# Patient Record
Sex: FEMALE | Race: WHITE | NOT HISPANIC OR LATINO | Employment: FULL TIME | URBAN - METROPOLITAN AREA
[De-identification: names, ages, dates, MRNs, and addresses within clinical notes are randomized per-mention and may not be internally consistent; named-entity substitution may affect disease eponyms.]

---

## 2018-06-25 ENCOUNTER — TELEPHONE (OUTPATIENT)
Dept: FAMILY MEDICINE | Facility: CLINIC | Age: 58
End: 2018-06-25

## 2018-06-25 NOTE — TELEPHONE ENCOUNTER
----- Message from Марина Lopez sent at 6/25/2018  8:58 AM CDT -----  Contact: Patient  Type:  Sooner Apoointment Request    Caller is requesting a sooner appointment.  Caller declined first available appointment listed below.  Caller will not accept being placed on the waitlist and is requesting a message be sent to doctor.    Name of Caller:  Patient  When is the first available appointment?  8/14  Symptoms:  NP - Annual and labs  Best Call Back Number:    Additional Information:

## 2018-06-27 ENCOUNTER — TELEPHONE (OUTPATIENT)
Dept: FAMILY MEDICINE | Facility: CLINIC | Age: 58
End: 2018-06-27

## 2018-06-27 ENCOUNTER — OFFICE VISIT (OUTPATIENT)
Dept: FAMILY MEDICINE | Facility: CLINIC | Age: 58
End: 2018-06-27
Payer: COMMERCIAL

## 2018-06-27 ENCOUNTER — LAB VISIT (OUTPATIENT)
Dept: LAB | Facility: HOSPITAL | Age: 58
End: 2018-06-27
Attending: PHYSICIAN ASSISTANT
Payer: COMMERCIAL

## 2018-06-27 VITALS
TEMPERATURE: 98 F | WEIGHT: 137.38 LBS | BODY MASS INDEX: 22.08 KG/M2 | HEIGHT: 66 IN | SYSTOLIC BLOOD PRESSURE: 105 MMHG | HEART RATE: 94 BPM | DIASTOLIC BLOOD PRESSURE: 73 MMHG

## 2018-06-27 DIAGNOSIS — R05.9 COUGH: ICD-10-CM

## 2018-06-27 DIAGNOSIS — Z11.59 NEED FOR HEPATITIS C SCREENING TEST: ICD-10-CM

## 2018-06-27 DIAGNOSIS — R79.89 POSITIVE D DIMER: ICD-10-CM

## 2018-06-27 DIAGNOSIS — Z82.61 FAMILY HISTORY OF RHEUMATOID ARTHRITIS: ICD-10-CM

## 2018-06-27 DIAGNOSIS — R42 DIZZINESS: ICD-10-CM

## 2018-06-27 DIAGNOSIS — Z13.220 LIPID SCREENING: ICD-10-CM

## 2018-06-27 DIAGNOSIS — M25.50 ARTHRALGIA, UNSPECIFIED JOINT: ICD-10-CM

## 2018-06-27 DIAGNOSIS — J18.9 PNEUMONIA OF RIGHT UPPER LOBE DUE TO INFECTIOUS ORGANISM: ICD-10-CM

## 2018-06-27 DIAGNOSIS — R50.9 FEVER OF UNKNOWN ORIGIN: Primary | ICD-10-CM

## 2018-06-27 DIAGNOSIS — R74.01 TRANSAMINITIS: ICD-10-CM

## 2018-06-27 DIAGNOSIS — R50.9 FEVER, UNSPECIFIED FEVER CAUSE: ICD-10-CM

## 2018-06-27 DIAGNOSIS — R50.9 FEVER, UNSPECIFIED FEVER CAUSE: Primary | ICD-10-CM

## 2018-06-27 DIAGNOSIS — D72.829 LEUKOCYTOSIS, UNSPECIFIED TYPE: ICD-10-CM

## 2018-06-27 LAB
ALBUMIN SERPL BCP-MCNC: 2.4 G/DL
ALP SERPL-CCNC: 119 U/L
ALT SERPL W/O P-5'-P-CCNC: 139 U/L
ANION GAP SERPL CALC-SCNC: 12 MMOL/L
AST SERPL-CCNC: 255 U/L
BASOPHILS # BLD AUTO: 0.07 K/UL
BASOPHILS NFR BLD: 0.4 %
BILIRUB SERPL-MCNC: 0.5 MG/DL
BILIRUB UR QL STRIP: NEGATIVE
BUN SERPL-MCNC: 6 MG/DL
CALCIUM SERPL-MCNC: 9.5 MG/DL
CHLORIDE SERPL-SCNC: 94 MMOL/L
CHOLEST SERPL-MCNC: 133 MG/DL
CHOLEST/HDLC SERPL: 9.5 {RATIO}
CLARITY UR REFRACT.AUTO: ABNORMAL
CO2 SERPL-SCNC: 29 MMOL/L
COLOR UR AUTO: ABNORMAL
CREAT SERPL-MCNC: 0.7 MG/DL
CRP SERPL-MCNC: 296.7 MG/L
DIFFERENTIAL METHOD: ABNORMAL
EOSINOPHIL # BLD AUTO: 0 K/UL
EOSINOPHIL NFR BLD: 0.1 %
ERYTHROCYTE [DISTWIDTH] IN BLOOD BY AUTOMATED COUNT: 14.4 %
ERYTHROCYTE [SEDIMENTATION RATE] IN BLOOD BY WESTERGREN METHOD: 112 MM/HR
EST. GFR  (AFRICAN AMERICAN): >60 ML/MIN/1.73 M^2
EST. GFR  (NON AFRICAN AMERICAN): >60 ML/MIN/1.73 M^2
GLUCOSE SERPL-MCNC: 108 MG/DL
GLUCOSE UR QL STRIP: NEGATIVE
HCT VFR BLD AUTO: 38.9 %
HDLC SERPL-MCNC: 14 MG/DL
HDLC SERPL: 10.5 %
HGB BLD-MCNC: 12.5 G/DL
HGB UR QL STRIP: NEGATIVE
IMM GRANULOCYTES # BLD AUTO: 0.22 K/UL
IMM GRANULOCYTES NFR BLD AUTO: 1.2 %
KETONES UR QL STRIP: ABNORMAL
LDLC SERPL CALC-MCNC: 89.6 MG/DL
LEUKOCYTE ESTERASE UR QL STRIP: NEGATIVE
LYMPHOCYTES # BLD AUTO: 1.6 K/UL
LYMPHOCYTES NFR BLD: 9.2 %
MCH RBC QN AUTO: 33 PG
MCHC RBC AUTO-ENTMCNC: 32.1 G/DL
MCV RBC AUTO: 103 FL
MONOCYTES # BLD AUTO: 1.6 K/UL
MONOCYTES NFR BLD: 8.8 %
NEUTROPHILS # BLD AUTO: 14.2 K/UL
NEUTROPHILS NFR BLD: 80.3 %
NITRITE UR QL STRIP: NEGATIVE
NONHDLC SERPL-MCNC: 119 MG/DL
NRBC BLD-RTO: 0 /100 WBC
PH UR STRIP: 6 [PH] (ref 5–8)
PLATELET # BLD AUTO: 530 K/UL
PMV BLD AUTO: 11.1 FL
POTASSIUM SERPL-SCNC: 3.5 MMOL/L
PROT SERPL-MCNC: 7.8 G/DL
PROT UR QL STRIP: NEGATIVE
RBC # BLD AUTO: 3.79 M/UL
RHEUMATOID FACT SERPL-ACNC: <10 IU/ML
SODIUM SERPL-SCNC: 135 MMOL/L
SP GR UR STRIP: 1.01 (ref 1–1.03)
TRIGL SERPL-MCNC: 147 MG/DL
TSH SERPL DL<=0.005 MIU/L-ACNC: 1.07 UIU/ML
URN SPEC COLLECT METH UR: ABNORMAL
UROBILINOGEN UR STRIP-ACNC: 4 EU/DL
WBC # BLD AUTO: 17.67 K/UL

## 2018-06-27 PROCEDURE — 81003 URINALYSIS AUTO W/O SCOPE: CPT

## 2018-06-27 PROCEDURE — 93000 ELECTROCARDIOGRAM COMPLETE: CPT | Mod: S$GLB,,, | Performed by: INTERNAL MEDICINE

## 2018-06-27 PROCEDURE — 85651 RBC SED RATE NONAUTOMATED: CPT | Mod: PO

## 2018-06-27 PROCEDURE — 86803 HEPATITIS C AB TEST: CPT

## 2018-06-27 PROCEDURE — 85025 COMPLETE CBC W/AUTO DIFF WBC: CPT

## 2018-06-27 PROCEDURE — 99999 PR PBB SHADOW E&M-EST. PATIENT-LVL III: CPT | Mod: PBBFAC,,, | Performed by: PHYSICIAN ASSISTANT

## 2018-06-27 PROCEDURE — 80053 COMPREHEN METABOLIC PANEL: CPT

## 2018-06-27 PROCEDURE — 87086 URINE CULTURE/COLONY COUNT: CPT

## 2018-06-27 PROCEDURE — 80061 LIPID PANEL: CPT

## 2018-06-27 PROCEDURE — 99204 OFFICE O/P NEW MOD 45 MIN: CPT | Mod: S$GLB,,, | Performed by: PHYSICIAN ASSISTANT

## 2018-06-27 PROCEDURE — 36415 COLL VENOUS BLD VENIPUNCTURE: CPT | Mod: PO

## 2018-06-27 PROCEDURE — 3008F BODY MASS INDEX DOCD: CPT | Mod: CPTII,S$GLB,, | Performed by: PHYSICIAN ASSISTANT

## 2018-06-27 PROCEDURE — 86140 C-REACTIVE PROTEIN: CPT

## 2018-06-27 PROCEDURE — 84443 ASSAY THYROID STIM HORMONE: CPT

## 2018-06-27 PROCEDURE — 86431 RHEUMATOID FACTOR QUANT: CPT

## 2018-06-27 PROCEDURE — 86038 ANTINUCLEAR ANTIBODIES: CPT

## 2018-06-27 NOTE — PROGRESS NOTES
Subjective:       Patient ID: Jesi Nagy is a 57 y.o. female.    Chief Complaint: Dizziness (fever)    Ms. Nagy is a new patient to our clinic. She has no chronic medical conditions and she takes no medication. The patient reports that she has had recurrent illness over the last 2 months. She reports that she has a cough productive of blood tinged phlegm. The patient has been diagnosed with sinus infection and treated with antibiotics, oral steroids, and cough syrup. The patient reports that she did have a chest xray at the urgent care. She was told there were no acute findings. The patient also reports fever each night with a tmax of 102. The patient reports body aches, arthralgia, myalgia. The patient does have a family history of RA. The patient denies recent travel or sick contacts. The patient admits to dizziness, which is worse with changing position.       Dizziness:   Chronicity:  New  Onset:  1 to 4 weeks ago  Progression since onset:  Gradually worsening  Frequency - weeks/days included: intermittently, daily.  Severity:  Moderate  Duration:  1 minute  Dizziness characteristics:  Sensation of movement and off-balance  Frequency of Spells:  Daily   Associated symptoms: ear congestion, a fever and aural fullness.no hearing loss, no headaches, no tinnitus, no nausea, no vomiting, no light-headedness, no syncope, no palpitations, no slurred speech, no numbness in extremities and no chest pain.  Aggravated by:  Position changes  Treatments tried:  Nothing  Fever    This is a recurrent problem. The current episode started more than 1 month ago. The problem occurs daily. The problem has been unchanged. The maximum temperature noted was 102 to 102.9 F. The temperature was taken using an oral thermometer. Associated symptoms include congestion, coughing, muscle aches and sleepiness. Pertinent negatives include no abdominal pain, chest pain, headaches, nausea, rash, urinary pain, vomiting or wheezing. She  has tried nothing for the symptoms.   Risk factors: no contaminated food, no contaminated water, no hx of cancer, no immunosuppression, no occupational exposure, no recent sickness, no recent travel and no sick contacts      Review of Systems   Constitutional: Positive for activity change and fever. Negative for appetite change.   HENT: Positive for congestion. Negative for hearing loss, postnasal drip, rhinorrhea, sinus pressure and tinnitus.    Eyes: Negative for visual disturbance.   Respiratory: Positive for cough. Negative for shortness of breath and wheezing.    Cardiovascular: Negative for chest pain, palpitations and syncope.   Gastrointestinal: Negative for abdominal distention, abdominal pain, nausea and vomiting.   Genitourinary: Negative for difficulty urinating and dysuria.   Musculoskeletal: Positive for arthralgias and myalgias.   Skin: Negative for rash.   Neurological: Positive for dizziness. Negative for light-headedness and headaches.   Hematological: Negative for adenopathy.   Psychiatric/Behavioral: The patient is not nervous/anxious.        Objective:      Physical Exam   Constitutional: She is oriented to person, place, and time.   Orthostatics  Lying 107/77 (78)  Sitting 106/79 (86)  Standing 112/80 (95)   HENT:   Mouth/Throat: Oropharynx is clear and moist. No oropharyngeal exudate.   Eyes: Conjunctivae are normal. Pupils are equal, round, and reactive to light.   Cardiovascular: Normal rate and regular rhythm.    Pulmonary/Chest: Effort normal and breath sounds normal. She has no wheezes.   Abdominal: Soft. Bowel sounds are normal. There is no tenderness.   Musculoskeletal: She exhibits no edema.   Lymphadenopathy:     She has no cervical adenopathy.   Neurological: She is alert and oriented to person, place, and time.   Skin: No erythema.   Psychiatric: Her behavior is normal.       Assessment:       1. Fever, unspecified fever cause    2. Dizziness    3. Cough    4. Arthralgia,  unspecified joint    5. Family history of rheumatoid arthritis        Plan:   Jesi was seen today for dizziness.    Diagnoses and all orders for this visit:    Fever, unspecified fever cause  -     CBC auto differential; Future  -     Comprehensive metabolic panel; Future  -     Urinalysis; Future  -     Urine culture; Future    Dizziness  -     EKG 12-lead  -     CBC auto differential; Future  -     Comprehensive metabolic panel; Future  -     TSH; Future  -     Echocardiogram stress test with [facility] (CUPID ONLY-[facility], [facility], [facility], [facility]); Future    Cough  We will request record of CXR  Arthralgia, unspecified joint  -     Rheumatoid factor; Future  -     Sedimentation rate; Future  -     C-reactive protein; Future  -     SWATI; Future    Family history of rheumatoid arthritis  -     Rheumatoid factor; Future  -     Sedimentation rate; Future  -     C-reactive protein; Future  -     SWATI; Future    Lipid screening  -     Lipid panel; Future    Need for hepatitis C screening test  -     Hepatitis C antibody; Future         Follow up early next week or sooner if needed. If symptoms worsen or if new concerning symptoms develop, seek urgent medical attention.

## 2018-06-27 NOTE — TELEPHONE ENCOUNTER
I called patient and advised her about the elevated white blood cell count. The patient reports it has been over a month since she stopped oral steroids. Patient advised that additional work up with additional blood work, CXR, and echo will are needed. Patient is coming tomorrow morning for CXR and blood work. Please schedule echo ASAP. Please schedule blood work and CXR for tomorrow morning.

## 2018-06-27 NOTE — TELEPHONE ENCOUNTER
Lab, x-ray and echocardiogram have been scheduled for tomorrow morning. Lab and x-ray will be at the hospital. Patient is aware and agrees to this.

## 2018-06-28 ENCOUNTER — HOSPITAL ENCOUNTER (OUTPATIENT)
Dept: RADIOLOGY | Facility: HOSPITAL | Age: 58
Discharge: HOME | DRG: 871 | End: 2018-06-28
Attending: PHYSICIAN ASSISTANT
Payer: COMMERCIAL

## 2018-06-28 ENCOUNTER — CLINICAL SUPPORT (OUTPATIENT)
Dept: CARDIOLOGY | Facility: CLINIC | Age: 58
End: 2018-06-28
Attending: PHYSICIAN ASSISTANT
Payer: COMMERCIAL

## 2018-06-28 VITALS — BODY MASS INDEX: 22.08 KG/M2 | WEIGHT: 137.38 LBS | HEIGHT: 66 IN

## 2018-06-28 DIAGNOSIS — D72.829 LEUKOCYTOSIS, UNSPECIFIED TYPE: ICD-10-CM

## 2018-06-28 DIAGNOSIS — R50.9 FEVER OF UNKNOWN ORIGIN: ICD-10-CM

## 2018-06-28 LAB
ANA SER QL IF: NORMAL
ASCENDING AORTA: 2.75 CM
AV MEAN GRADIENT: 2.49 MMHG
AV PEAK GRADIENT: 5.39 MMHG
AV VALVE AREA: 3.71 CM2
BACTERIA UR CULT: NORMAL
BACTERIA UR CULT: NORMAL
BSA FOR ECHO PROCEDURE: 1.7 M2
CV ECHO LV RWT: 0.3 CM
DOP CALC AO PEAK VEL: 1.16 M/S
DOP CALC AO VTI: 18.64 CM
DOP CALC LVOT AREA: 3.4 CM2
DOP CALC LVOT DIAMETER: 2.08 CM
DOP CALC LVOT STROKE VOLUME: 69.22 CM3
DOP CALCLVOT PEAK VEL VTI: 20.38 CM
E WAVE DECELERATION TIME: 202.53 MSEC
E/A RATIO: 0.78
E/E' RATIO: 6.53
ECHO LV POSTERIOR WALL: 0.68 CM (ref 0.6–1.1)
FRACTIONAL SHORTENING: 32 % (ref 28–44)
HCV AB SERPL QL IA: NEGATIVE
INTERVENTRICULAR SEPTUM: 0.75 CM (ref 0.6–1.1)
IVRT: 0.11 MSEC
LA MAJOR: 3.34 CM
LA MINOR: 3.92 CM
LA WIDTH: 1.59 CM
LEFT ATRIUM SIZE: 2.39 CM
LEFT ATRIUM VOLUME INDEX: 6.9 ML/M2
LEFT ATRIUM VOLUME: 11.65 CM3
LEFT INTERNAL DIMENSION IN SYSTOLE: 3.23 CM (ref 2.1–4)
LEFT VENTRICLE MASS INDEX: 62.7 G/M2
LEFT VENTRICULAR INTERNAL DIMENSION IN DIASTOLE: 4.72 CM (ref 3.5–6)
LEFT VENTRICULAR MASS: 106.64 G
LV LATERAL E/E' RATIO: 5.17
LV SEPTAL E/E' RATIO: 8.86
MV PEAK A VEL: 0.8 M/S
MV PEAK E VEL: 0.62 M/S
MV STENOSIS PRESSURE HALF TIME: 58.73 MS
MV VALVE AREA P 1/2 METHOD: 3.75 CM2
PISA TR MAX VEL: 2.32 M/S
PULM VEIN S/D RATIO: 1.76
PV PEAK D VEL: 0.37 M/S
PV PEAK S VEL: 0.65 M/S
RA MAJOR: 3.1 CM
RA PRESSURE: 3 MMHG
RA WIDTH: 1.67 CM
RIGHT VENTRICULAR END-DIASTOLIC DIMENSION: 2 CM
SINUS: 2.42 CM
STJ: 2.32 CM
TDI LATERAL: 0.12
TDI SEPTAL: 0.07
TDI: 0.1
TR MAX PG: 21.53 MMHG
TRICUSPID ANNULAR PLANE SYSTOLIC EXCURSION: 0.02 CM
TV REST PULMONARY ARTERY PRESSURE: 24.53 MMHG

## 2018-06-28 PROCEDURE — 71046 X-RAY EXAM CHEST 2 VIEWS: CPT | Mod: 26,,, | Performed by: RADIOLOGY

## 2018-06-28 PROCEDURE — 71046 X-RAY EXAM CHEST 2 VIEWS: CPT | Mod: TC,FY

## 2018-06-28 PROCEDURE — 99999 PR PBB SHADOW E&M-EST. PATIENT-LVL I: CPT | Mod: PBBFAC,,,

## 2018-06-28 PROCEDURE — 93306 TTE W/DOPPLER COMPLETE: CPT | Mod: S$GLB,,, | Performed by: INTERNAL MEDICINE

## 2018-06-29 ENCOUNTER — HOSPITAL ENCOUNTER (OUTPATIENT)
Dept: RADIOLOGY | Facility: HOSPITAL | Age: 58
Discharge: HOME | DRG: 871 | End: 2018-06-29
Attending: PHYSICIAN ASSISTANT
Payer: COMMERCIAL

## 2018-06-29 ENCOUNTER — HOSPITAL ENCOUNTER (INPATIENT)
Facility: HOSPITAL | Age: 58
LOS: 7 days | Discharge: HOME | DRG: 871 | End: 2018-07-06
Attending: HOSPITALIST | Admitting: HOSPITALIST
Payer: COMMERCIAL

## 2018-06-29 ENCOUNTER — CLINICAL SUPPORT (OUTPATIENT)
Dept: FAMILY MEDICINE | Facility: CLINIC | Age: 58
End: 2018-06-29
Payer: COMMERCIAL

## 2018-06-29 DIAGNOSIS — J85.1 ABSCESS OF RIGHT LUNG WITH PNEUMONIA, UNSPECIFIED PART OF LUNG: ICD-10-CM

## 2018-06-29 DIAGNOSIS — R79.89 POSITIVE D DIMER: ICD-10-CM

## 2018-06-29 DIAGNOSIS — J18.9 PNEUMONIA OF RIGHT UPPER LOBE DUE TO INFECTIOUS ORGANISM: ICD-10-CM

## 2018-06-29 DIAGNOSIS — J85.0 NECROTIZING PNEUMONIA: ICD-10-CM

## 2018-06-29 DIAGNOSIS — R04.2 HEMOPTYSIS: Primary | ICD-10-CM

## 2018-06-29 DIAGNOSIS — R93.89 ABNORMAL CHEST CT: Primary | ICD-10-CM

## 2018-06-29 PROBLEM — K02.9 CARIES: Status: ACTIVE | Noted: 2018-06-29

## 2018-06-29 PROCEDURE — 63600175 PHARM REV CODE 636 W HCPCS: Performed by: INTERNAL MEDICINE

## 2018-06-29 PROCEDURE — 71275 CT ANGIOGRAPHY CHEST: CPT | Mod: TC

## 2018-06-29 PROCEDURE — 94761 N-INVAS EAR/PLS OXIMETRY MLT: CPT

## 2018-06-29 PROCEDURE — 86580 TB INTRADERMAL TEST: CPT | Performed by: INTERNAL MEDICINE

## 2018-06-29 PROCEDURE — 71275 CT ANGIOGRAPHY CHEST: CPT | Mod: 26,,, | Performed by: RADIOLOGY

## 2018-06-29 PROCEDURE — 25500020 PHARM REV CODE 255

## 2018-06-29 PROCEDURE — 36415 COLL VENOUS BLD VENIPUNCTURE: CPT

## 2018-06-29 PROCEDURE — 25000003 PHARM REV CODE 250: Performed by: INTERNAL MEDICINE

## 2018-06-29 PROCEDURE — 99223 1ST HOSP IP/OBS HIGH 75: CPT | Mod: ,,, | Performed by: INTERNAL MEDICINE

## 2018-06-29 PROCEDURE — 87040 BLOOD CULTURE FOR BACTERIA: CPT | Mod: 59

## 2018-06-29 PROCEDURE — 25000003 PHARM REV CODE 250: Performed by: HOSPITALIST

## 2018-06-29 PROCEDURE — 11000001 HC ACUTE MED/SURG PRIVATE ROOM

## 2018-06-29 RX ORDER — HYDROCODONE BITARTRATE AND ACETAMINOPHEN 5; 325 MG/1; MG/1
1 TABLET ORAL EVERY 6 HOURS PRN
Status: DISCONTINUED | OUTPATIENT
Start: 2018-06-29 | End: 2018-07-01

## 2018-06-29 RX ORDER — POTASSIUM CHLORIDE 20 MEQ/15ML
40 SOLUTION ORAL
Status: DISCONTINUED | OUTPATIENT
Start: 2018-06-29 | End: 2018-07-06 | Stop reason: HOSPADM

## 2018-06-29 RX ORDER — LEVOFLOXACIN 750 MG/1
750 TABLET ORAL DAILY
Qty: 7 TABLET | Refills: 0 | Status: SHIPPED | OUTPATIENT
Start: 2018-06-29 | End: 2018-06-29

## 2018-06-29 RX ORDER — GLUCAGON 1 MG
1 KIT INJECTION
Status: DISCONTINUED | OUTPATIENT
Start: 2018-06-29 | End: 2018-07-06 | Stop reason: HOSPADM

## 2018-06-29 RX ORDER — IBUPROFEN 200 MG
24 TABLET ORAL
Status: DISCONTINUED | OUTPATIENT
Start: 2018-06-29 | End: 2018-07-06 | Stop reason: HOSPADM

## 2018-06-29 RX ORDER — ONDANSETRON 2 MG/ML
8 INJECTION INTRAMUSCULAR; INTRAVENOUS EVERY 8 HOURS PRN
Status: DISCONTINUED | OUTPATIENT
Start: 2018-06-29 | End: 2018-07-06 | Stop reason: HOSPADM

## 2018-06-29 RX ORDER — IBUPROFEN 600 MG/1
600 TABLET ORAL 4 TIMES DAILY
Status: DISCONTINUED | OUTPATIENT
Start: 2018-06-29 | End: 2018-07-06 | Stop reason: HOSPADM

## 2018-06-29 RX ORDER — HYDROCODONE POLISTIREX AND CHLORPHENIRAMINE POLISTIREX 10; 8 MG/5ML; MG/5ML
5 SUSPENSION, EXTENDED RELEASE ORAL EVERY 12 HOURS PRN
Status: DISCONTINUED | OUTPATIENT
Start: 2018-06-29 | End: 2018-07-02

## 2018-06-29 RX ORDER — RAMELTEON 8 MG/1
8 TABLET ORAL NIGHTLY
Status: DISCONTINUED | OUTPATIENT
Start: 2018-06-29 | End: 2018-07-06 | Stop reason: HOSPADM

## 2018-06-29 RX ORDER — CEFTRIAXONE 1 G/1
1 INJECTION, POWDER, FOR SOLUTION INTRAMUSCULAR; INTRAVENOUS
Status: DISCONTINUED | OUTPATIENT
Start: 2018-06-29 | End: 2018-06-29

## 2018-06-29 RX ORDER — RAMELTEON 8 MG/1
8 TABLET ORAL NIGHTLY PRN
Status: DISCONTINUED | OUTPATIENT
Start: 2018-06-29 | End: 2018-06-29

## 2018-06-29 RX ORDER — ACETAMINOPHEN 500 MG
1000 TABLET ORAL EVERY 6 HOURS PRN
Status: DISCONTINUED | OUTPATIENT
Start: 2018-06-29 | End: 2018-07-06 | Stop reason: HOSPADM

## 2018-06-29 RX ORDER — VANCOMYCIN HCL IN 5 % DEXTROSE 1G/250ML
1000 PLASTIC BAG, INJECTION (ML) INTRAVENOUS
Status: DISCONTINUED | OUTPATIENT
Start: 2018-06-30 | End: 2018-07-01

## 2018-06-29 RX ORDER — SODIUM CHLORIDE 0.9 % (FLUSH) 0.9 %
5 SYRINGE (ML) INJECTION
Status: DISCONTINUED | OUTPATIENT
Start: 2018-06-29 | End: 2018-07-06 | Stop reason: HOSPADM

## 2018-06-29 RX ORDER — LANOLIN ALCOHOL/MO/W.PET/CERES
800 CREAM (GRAM) TOPICAL
Status: DISCONTINUED | OUTPATIENT
Start: 2018-06-29 | End: 2018-07-06 | Stop reason: HOSPADM

## 2018-06-29 RX ORDER — SODIUM CHLORIDE 9 MG/ML
INJECTION, SOLUTION INTRAVENOUS
Status: DISPENSED
Start: 2018-06-29 | End: 2018-06-29

## 2018-06-29 RX ORDER — IBUPROFEN 200 MG
16 TABLET ORAL
Status: DISCONTINUED | OUTPATIENT
Start: 2018-06-29 | End: 2018-07-06 | Stop reason: HOSPADM

## 2018-06-29 RX ADMIN — PIPERACILLIN SODIUM AND TAZOBACTAM SODIUM 4.5 G: 4; .5 INJECTION, POWDER, LYOPHILIZED, FOR SOLUTION INTRAVENOUS at 03:06

## 2018-06-29 RX ADMIN — HYDROCODONE POLISTIREX AND CHLORPHENIRAMINE POLISITREX 5 ML: 10; 8 SUSPENSION, EXTENDED RELEASE ORAL at 07:06

## 2018-06-29 RX ADMIN — ACETAMINOPHEN 1000 MG: 500 TABLET, FILM COATED ORAL at 03:06

## 2018-06-29 RX ADMIN — IBUPROFEN 600 MG: 600 TABLET ORAL at 01:06

## 2018-06-29 RX ADMIN — PIPERACILLIN SODIUM AND TAZOBACTAM SODIUM 4.5 G: 4; .5 INJECTION, POWDER, LYOPHILIZED, FOR SOLUTION INTRAVENOUS at 11:06

## 2018-06-29 RX ADMIN — TUBERCULIN PURIFIED PROTEIN DERIVATIVE 5 UNITS: 5 INJECTION, SOLUTION INTRADERMAL at 03:06

## 2018-06-29 RX ADMIN — IOHEXOL 75 ML: 350 INJECTION, SOLUTION INTRAVENOUS at 08:06

## 2018-06-29 RX ADMIN — HYDROCODONE BITARTRATE AND ACETAMINOPHEN 1 TABLET: 5; 325 TABLET ORAL at 11:06

## 2018-06-29 RX ADMIN — HYDROCODONE BITARTRATE AND ACETAMINOPHEN 1 TABLET: 5; 325 TABLET ORAL at 04:06

## 2018-06-29 RX ADMIN — VANCOMYCIN HYDROCHLORIDE 1250 MG: 1 INJECTION, POWDER, LYOPHILIZED, FOR SOLUTION INTRAVENOUS at 03:06

## 2018-06-29 RX ADMIN — IBUPROFEN 600 MG: 600 TABLET ORAL at 11:06

## 2018-06-29 RX ADMIN — IBUPROFEN 600 MG: 600 TABLET ORAL at 04:06

## 2018-06-29 RX ADMIN — RAMELTEON 8 MG: 8 TABLET, FILM COATED ORAL at 11:06

## 2018-06-29 NOTE — CONSULTS
Jesi Nagy [id number] is a 57 y.o. female who has been consulted for vancomycin dosing.    The patient has the following labs:     Date Creatinine (mg/dl)    BUN WBC Count   6/29/2018 Estimated Creatinine Clearance: 85 mL/min (based on SCr of 0.7 mg/dL). Lab Results   Component Value Date    BUN 6 06/27/2018     Lab Results   Component Value Date    WBC 17.67 (H) 06/27/2018        Current weight is 61.2 kg (135 lb)    The patient received  1250 mg on 06/29 at 1500 (if pt has already received first dose including doses in ED)    The patient will be started on vancomycin at a dose of 1000 mg every 12 hours (16 mg/kg/dose).  The vancomycin trough has been ordered for 07/01 at 0230.  Target trough goal is 15 to 20 mg/dL for pneumonia.    Patient will be followed by pharmacy for changes in renal function, toxicity, and efficacy.   Thank you for allowing us to participate in this patient's care.     Jr Nelson, FazalD

## 2018-06-29 NOTE — PLAN OF CARE
Problem: Patient Care Overview  Goal: Individualization & Mutuality  Pt admitted to floor. Tb isolation iniatited. TB test preformed to Left FA. IV Abx infused. VSS. Pain controled with PRN pain meds. Family at bedside. Bed low locked. Call light in reach. Will continue to monitor.

## 2018-06-29 NOTE — HPI
Patient is a 57-year-old  female with a past medical history significant for anxiety who presents to the hospital after having being referred from the clinic.  Patient had upper respiratory symptoms for approximately 6 months prior to admission.  Showed intermittent periods of cough, bloody sputum, and fever up to 102.  Denies any night sweats or weight loss.  Each time, the patient had presented to urgent care and started on antibiotics for short course in which she transiently improved but then relapsed again approximately 1-2 weeks later.  Patient has been through per her own description at least 5 courses of oral antibiotics and has continued to have intermittent spiking fevers in between regimens of antibiotics.  She sought care in the Primary Care Clinic and was initially found to have an abnormality on chest x-ray and markedly elevated inflammatory markers which prompted further evaluation by CT scan of the chest.  On CT scan, the patient was found to have a right upper lobe necrotizing pneumonia.  Patient states she continues to have sharp chest pain which is exacerbated with activity and deep breathing.  She does also admit to intermittent fevers the last of which was 2 days ago.  Patient denies any nausea, vomiting, diarrhea, or other abdominal complaints.

## 2018-06-29 NOTE — H&P
[facility] Hospital Medicine  History & Physical    Patient Name: Jesi Nagy  MRN: [id number]  Admission Date: 6/29/2018  Attending Physician: Melvin Garcia MD  Primary Care Provider: Primary Doctor No         Patient information was obtained from patient and ER records.     Subjective:     Principal Problem:Necrotizing pneumonia    Chief Complaint: No chief complaint on file.       HPI: Patient is a 57-year-old  female with a past medical history significant for anxiety who presents to the hospital after having being referred from the clinic.  Patient had upper respiratory symptoms for approximately 6 months prior to admission.  Showed intermittent periods of cough, bloody sputum, and fever up to 102.  Denies any night sweats or weight loss.  Each time, the patient had presented to urgent care and started on antibiotics for short course in which she transiently improved but then relapsed again approximately 1-2 weeks later.  Patient has been through per her own description at least 5 courses of oral antibiotics and has continued to have intermittent spiking fevers in between regimens of antibiotics.  She sought care in the Primary Care Clinic and was initially found to have an abnormality on chest x-ray and markedly elevated inflammatory markers which prompted further evaluation by CT scan of the chest.  On CT scan, the patient was found to have a right upper lobe necrotizing pneumonia.  Patient states she continues to have sharp chest pain which is exacerbated with activity and deep breathing.  She does also admit to intermittent fevers the last of which was 2 days ago.  Patient denies any nausea, vomiting, diarrhea, or other abdominal complaints.    No past medical history on file.    Past Surgical History:   Procedure Laterality Date    baker's cyst removal      BREAST SURGERY      HYSTERECTOMY      KNEE SURGERY Bilateral     laproscopic surgery    SHOULDER SURGERY Left      laproscopic       Review of patient's allergies indicates:  No Known Allergies    Current Facility-Administered Medications on File Prior to Encounter   Medication    [COMPLETED] omnipaque 350 iohexol 350 mg iodine/mL    sodium chloride 0.9% infusion     Current Outpatient Prescriptions on File Prior to Encounter   Medication Sig    [DISCONTINUED] levoFLOXacin (LEVAQUIN) 750 MG tablet Take 1 tablet (750 mg total) by mouth once daily.     Family History     Problem Relation (Age of Onset)    Heart disease Father    Parkinsonism Father    Rheum arthritis Mother        Social History Main Topics    Smoking status: Former Smoker     Packs/day: 1.00     Years: 30.00     Types: Cigarettes    Smokeless tobacco: Never Used      Comment: Patient now vapes    Alcohol use 4.2 oz/week     7 Glasses of wine per week      Comment: glass of wine daily    Drug use: No    Sexual activity: Yes     Birth control/ protection: See Surgical Hx     Review of Systems   Constitutional: Positive for activity change, appetite change, fatigue and fever.   HENT: Negative for ear discharge, facial swelling and sore throat.    Eyes: Negative for photophobia, pain and visual disturbance.   Respiratory: Positive for cough and shortness of breath. Negative for apnea.    Cardiovascular: Negative for chest pain and leg swelling.   Gastrointestinal: Negative for abdominal pain and blood in stool.   Endocrine: Negative for cold intolerance and heat intolerance.   Musculoskeletal: Negative for back pain and gait problem.   Skin: Negative for pallor and rash.   Neurological: Negative for speech difficulty and headaches.   Psychiatric/Behavioral: Negative for confusion, hallucinations and suicidal ideas.   All other systems reviewed and are negative.    Objective:     Vital Signs (Most Recent):  Temp: 96.3 °F (35.7 °C) (06/29/18 1611)  Pulse: 84 (06/29/18 1611)  Resp: 18 (06/29/18 1611)  BP: 117/63 (06/29/18 1611)  SpO2: 98 % (06/29/18 1611)  Vital Signs (24h Range):  Temp:  [96.3 °F (35.7 °C)-97.8 °F (36.6 °C)] 96.3 °F (35.7 °C)  Pulse:  [84-98] 84  Resp:  [18] 18  SpO2:  [98 %] 98 %  BP: (117-137)/(63-73) 117/63     Weight: 61.2 kg (135 lb)  Body mass index is 21.79 kg/m².    Physical Exam   Constitutional: She is oriented to person, place, and time. She appears well-developed and well-nourished.   HENT:   Head: Normocephalic and atraumatic.   Eyes: Right eye exhibits no discharge. Left eye exhibits no discharge. No scleral icterus.   Neck: Neck supple. No JVD present.   Cardiovascular: Normal rate and regular rhythm.  Exam reveals no gallop and no friction rub.    No murmur heard.  Pulmonary/Chest: Effort normal. She has no wheezes. She has no rales.   Decreased breath sounds noted at the right upper lung field.  No increased work of breathing is noted. Chest wall tenderness noted on palpation particularly of the right side.   Abdominal: Soft. Bowel sounds are normal. She exhibits no distension. There is no tenderness.   Musculoskeletal: She exhibits no edema or tenderness.   Lymphadenopathy:     She has no cervical adenopathy.   Neurological: She is alert and oriented to person, place, and time. She has normal reflexes. No cranial nerve deficit.   Skin: No rash noted. No erythema.   Psychiatric: She has a normal mood and affect.   Nursing note and vitals reviewed.          Significant Labs: All pertinent labs within the past 24 hours have been reviewed.    Significant Imaging: I have reviewed all pertinent imaging results/findings within the past 24 hours.    Assessment/Plan:     * Necrotizing pneumonia    Patient with near complete opacification of the right upper lobe with severe necrotizing pneumonia and what appears to be air fluid levels within the lung.  Likely will need surgical treatment to excise this necrotizing pneumonia given the fact the patient has been on antibiotics previously without success..  Will consult with pulmonology and  cardiothoracic surgery and assess probability for patient to receive thoracotomy with partial lobectomy.  Will continue patient on IV antibiotics for healthcare associated pneumonia with addition of anaerobes given necrotizing component.  Defer further management to pulmonology and/or CT surgery.  It is unlikely the patient has tuberculosis based off of risk factors, however will follow up TB gold and isolate patient until lab result is back.          Abscess of right lung with pneumonia    Treatment per above          Hemoptysis    Etiology likely related to severe pneumonia.  Treatment per above.  Malignancy unlikely, however not completely ruled out at this point.            VTE Risk Mitigation         Ordered     IP VTE LOW RISK PATIENT  Once      06/29/18 1243             Melvin Garcia MD  Department of Hospital Medicine   [facility]

## 2018-06-29 NOTE — ASSESSMENT & PLAN NOTE
Etiology likely related to severe pneumonia.  Treatment per above.  Malignancy unlikely, however not completely ruled out at this point.

## 2018-06-29 NOTE — PROGRESS NOTES
Subjective:       Patient ID: Jesi Nagy is a 57 y.o. female.    Chief Complaint: No chief complaint on file.    HPI  Review of Systems    Objective:      Physical Exam    Assessment:       1. Abnormal chest CT    2. Pneumonia of right upper lobe due to infectious organism        Plan:   Diagnoses and all orders for this visit:    Abnormal chest CT    Pneumonia of right upper lobe due to infectious organism    Patient came to clinic. CTA results reviewed with patient. Patient directly admitted to hospital. Report given to Melvin Garcia MD.

## 2018-06-29 NOTE — PLAN OF CARE
Cm completed the assessment at pt's bedside.  Spouse at bedside.  Pt arrived from home, she is independent in care.  No PCP.  Insurance verified as [employer].   No dme.  Pt denies diabetes, dialysis and coumadin.  Disposition:  Pt will discharge to home with family.  Pt verbalized no needs at this time.       06/29/18 1601   Discharge Assessment   Assessment Type Discharge Planning Assessment   Confirmed/corrected address and phone number on facesheet? Yes   Assessment information obtained from? Patient   Prior to hospitilization cognitive status: Alert/Oriented   Prior to hospitalization functional status: Independent   Current cognitive status: Alert/Oriented   Current Functional Status: Independent   Facility Arrived From: home   Lives With spouse   Able to Return to Prior Arrangements yes   Is patient able to care for self after discharge? Yes   Who are your caregiver(s) and their phone number(s)? spouse - Crow Nagy - [phone]   Patient's perception of discharge disposition home or selfcare   Readmission Within The Last 30 Days no previous admission in last 30 days   Patient currently being followed by outpatient case management? No   Patient currently receives any other outside agency services? No   Equipment Currently Used at Home none   Do you have any problems affording any of your prescribed medications? No   Is the patient taking medications as prescribed? yes  ([facility] [address]   Does the patient have transportation home? Yes   Transportation Available car;family or friend will provide   Dialysis Name and Scheduled days na   Does the patient receive services at the Coumadin Clinic? No   Discharge Plan A Home with family   Patient/Family In Agreement With Plan yes

## 2018-06-29 NOTE — SUBJECTIVE & OBJECTIVE
No past medical history on file.    Past Surgical History:   Procedure Laterality Date    baker's cyst removal      BREAST SURGERY      HYSTERECTOMY      KNEE SURGERY Bilateral     laproscopic surgery    SHOULDER SURGERY Left     laproscopic       Review of patient's allergies indicates:  No Known Allergies    Current Facility-Administered Medications on File Prior to Encounter   Medication    [COMPLETED] omnipaque 350 iohexol 350 mg iodine/mL    sodium chloride 0.9% infusion     Current Outpatient Prescriptions on File Prior to Encounter   Medication Sig    [DISCONTINUED] levoFLOXacin (LEVAQUIN) 750 MG tablet Take 1 tablet (750 mg total) by mouth once daily.     Family History     Problem Relation (Age of Onset)    Heart disease Father    Parkinsonism Father    Rheum arthritis Mother        Social History Main Topics    Smoking status: Former Smoker     Packs/day: 1.00     Years: 30.00     Types: Cigarettes    Smokeless tobacco: Never Used      Comment: Patient now vapes    Alcohol use 4.2 oz/week     7 Glasses of wine per week      Comment: glass of wine daily    Drug use: No    Sexual activity: Yes     Birth control/ protection: See Surgical Hx     Review of Systems   Constitutional: Positive for activity change, appetite change, fatigue and fever.   HENT: Negative for ear discharge, facial swelling and sore throat.    Eyes: Negative for photophobia, pain and visual disturbance.   Respiratory: Positive for cough and shortness of breath. Negative for apnea.    Cardiovascular: Negative for chest pain and leg swelling.   Gastrointestinal: Negative for abdominal pain and blood in stool.   Endocrine: Negative for cold intolerance and heat intolerance.   Musculoskeletal: Negative for back pain and gait problem.   Skin: Negative for pallor and rash.   Neurological: Negative for speech difficulty and headaches.   Psychiatric/Behavioral: Negative for confusion, hallucinations and suicidal ideas.   All other  systems reviewed and are negative.    Objective:     Vital Signs (Most Recent):  Temp: 96.3 °F (35.7 °C) (06/29/18 1611)  Pulse: 84 (06/29/18 1611)  Resp: 18 (06/29/18 1611)  BP: 117/63 (06/29/18 1611)  SpO2: 98 % (06/29/18 1611) Vital Signs (24h Range):  Temp:  [96.3 °F (35.7 °C)-97.8 °F (36.6 °C)] 96.3 °F (35.7 °C)  Pulse:  [84-98] 84  Resp:  [18] 18  SpO2:  [98 %] 98 %  BP: (117-137)/(63-73) 117/63     Weight: 61.2 kg (135 lb)  Body mass index is 21.79 kg/m².    Physical Exam   Constitutional: She is oriented to person, place, and time. She appears well-developed and well-nourished.   HENT:   Head: Normocephalic and atraumatic.   Eyes: Right eye exhibits no discharge. Left eye exhibits no discharge. No scleral icterus.   Neck: Neck supple. No JVD present.   Cardiovascular: Normal rate and regular rhythm.  Exam reveals no gallop and no friction rub.    No murmur heard.  Pulmonary/Chest: Effort normal. She has no wheezes. She has no rales.   Decreased breath sounds noted at the right upper lung field.  No increased work of breathing is noted. Chest wall tenderness noted on palpation particularly of the right side.   Abdominal: Soft. Bowel sounds are normal. She exhibits no distension. There is no tenderness.   Musculoskeletal: She exhibits no edema or tenderness.   Lymphadenopathy:     She has no cervical adenopathy.   Neurological: She is alert and oriented to person, place, and time. She has normal reflexes. No cranial nerve deficit.   Skin: No rash noted. No erythema.   Psychiatric: She has a normal mood and affect.   Nursing note and vitals reviewed.          Significant Labs: All pertinent labs within the past 24 hours have been reviewed.    Significant Imaging: I have reviewed all pertinent imaging results/findings within the past 24 hours.

## 2018-06-29 NOTE — ASSESSMENT & PLAN NOTE
Patient with near complete opacification of the right upper lobe with severe necrotizing pneumonia and what appears to be air fluid levels within the lung.  Likely will need surgical treatment to excise this necrotizing pneumonia given the fact the patient has been on antibiotics previously without success..  Will consult with pulmonology and cardiothoracic surgery and assess probability for patient to receive thoracotomy with partial lobectomy.  Will continue patient on IV antibiotics for healthcare associated pneumonia with addition of anaerobes given necrotizing component.  Defer further management to pulmonology and/or CT surgery.  It is unlikely the patient has tuberculosis based off of risk factors, however will follow up TB gold and isolate patient until lab result is back.

## 2018-06-30 PROBLEM — E87.6 HYPOKALEMIA: Status: ACTIVE | Noted: 2018-06-30

## 2018-06-30 LAB
ALBUMIN SERPL BCP-MCNC: 2 G/DL
ALP SERPL-CCNC: 93 U/L
ALT SERPL W/O P-5'-P-CCNC: 66 U/L
ANION GAP SERPL CALC-SCNC: 11 MMOL/L
AST SERPL-CCNC: 52 U/L
BASOPHILS # BLD AUTO: 0.1 K/UL
BASOPHILS NFR BLD: 0.4 %
BILIRUB SERPL-MCNC: 0.5 MG/DL
BUN SERPL-MCNC: 6 MG/DL
CALCIUM SERPL-MCNC: 8.7 MG/DL
CHLORIDE SERPL-SCNC: 95 MMOL/L
CO2 SERPL-SCNC: 29 MMOL/L
CREAT SERPL-MCNC: 0.7 MG/DL
DIFFERENTIAL METHOD: ABNORMAL
EOSINOPHIL # BLD AUTO: 0 K/UL
EOSINOPHIL NFR BLD: 0.3 %
ERYTHROCYTE [DISTWIDTH] IN BLOOD BY AUTOMATED COUNT: 15.9 %
EST. GFR  (AFRICAN AMERICAN): >60 ML/MIN/1.73 M^2
EST. GFR  (NON AFRICAN AMERICAN): >60 ML/MIN/1.73 M^2
GLUCOSE SERPL-MCNC: 107 MG/DL
HCT VFR BLD AUTO: 35 %
HGB BLD-MCNC: 11.8 G/DL
LYMPHOCYTES # BLD AUTO: 1.1 K/UL
LYMPHOCYTES NFR BLD: 8.3 %
MAGNESIUM SERPL-MCNC: 2 MG/DL
MCH RBC QN AUTO: 33.3 PG
MCHC RBC AUTO-ENTMCNC: 33.7 G/DL
MCV RBC AUTO: 99 FL
MONOCYTES # BLD AUTO: 0.8 K/UL
MONOCYTES NFR BLD: 5.8 %
NEUTROPHILS # BLD AUTO: 11.5 K/UL
NEUTROPHILS NFR BLD: 85.2 %
PHOSPHATE SERPL-MCNC: 3.4 MG/DL
PLATELET # BLD AUTO: 426 K/UL
PMV BLD AUTO: 9.1 FL
POTASSIUM SERPL-SCNC: 2.8 MMOL/L
PROT SERPL-MCNC: 6.3 G/DL
RBC # BLD AUTO: 3.54 M/UL
SODIUM SERPL-SCNC: 135 MMOL/L
WBC # BLD AUTO: 13.5 K/UL

## 2018-06-30 PROCEDURE — 87206 SMEAR FLUORESCENT/ACID STAI: CPT

## 2018-06-30 PROCEDURE — 99232 SBSQ HOSP IP/OBS MODERATE 35: CPT | Mod: ,,, | Performed by: INTERNAL MEDICINE

## 2018-06-30 PROCEDURE — 63600175 PHARM REV CODE 636 W HCPCS: Performed by: HOSPITALIST

## 2018-06-30 PROCEDURE — 25000003 PHARM REV CODE 250: Performed by: HOSPITALIST

## 2018-06-30 PROCEDURE — 87116 MYCOBACTERIA CULTURE: CPT

## 2018-06-30 PROCEDURE — 87205 SMEAR GRAM STAIN: CPT

## 2018-06-30 PROCEDURE — 25000003 PHARM REV CODE 250: Performed by: INTERNAL MEDICINE

## 2018-06-30 PROCEDURE — 83735 ASSAY OF MAGNESIUM: CPT

## 2018-06-30 PROCEDURE — 84100 ASSAY OF PHOSPHORUS: CPT

## 2018-06-30 PROCEDURE — 94761 N-INVAS EAR/PLS OXIMETRY MLT: CPT

## 2018-06-30 PROCEDURE — 11000001 HC ACUTE MED/SURG PRIVATE ROOM

## 2018-06-30 PROCEDURE — 87070 CULTURE OTHR SPECIMN AEROBIC: CPT

## 2018-06-30 PROCEDURE — 85025 COMPLETE CBC W/AUTO DIFF WBC: CPT

## 2018-06-30 PROCEDURE — 25000003 PHARM REV CODE 250: Performed by: NURSE PRACTITIONER

## 2018-06-30 PROCEDURE — 63600175 PHARM REV CODE 636 W HCPCS: Performed by: INTERNAL MEDICINE

## 2018-06-30 PROCEDURE — 80053 COMPREHEN METABOLIC PANEL: CPT

## 2018-06-30 PROCEDURE — 87015 SPECIMEN INFECT AGNT CONCNTJ: CPT

## 2018-06-30 PROCEDURE — 36415 COLL VENOUS BLD VENIPUNCTURE: CPT

## 2018-06-30 RX ORDER — SODIUM CHLORIDE 9 MG/ML
INJECTION, SOLUTION INTRAVENOUS CONTINUOUS
Status: DISCONTINUED | OUTPATIENT
Start: 2018-06-30 | End: 2018-07-02

## 2018-06-30 RX ADMIN — IBUPROFEN 600 MG: 600 TABLET ORAL at 09:06

## 2018-06-30 RX ADMIN — SODIUM CHLORIDE: 0.9 INJECTION, SOLUTION INTRAVENOUS at 02:06

## 2018-06-30 RX ADMIN — PIPERACILLIN SODIUM AND TAZOBACTAM SODIUM 4.5 G: 4; .5 INJECTION, POWDER, LYOPHILIZED, FOR SOLUTION INTRAVENOUS at 04:06

## 2018-06-30 RX ADMIN — PIPERACILLIN SODIUM AND TAZOBACTAM SODIUM 4.5 G: 4; .5 INJECTION, POWDER, LYOPHILIZED, FOR SOLUTION INTRAVENOUS at 11:06

## 2018-06-30 RX ADMIN — PIPERACILLIN SODIUM AND TAZOBACTAM SODIUM 4.5 G: 4; .5 INJECTION, POWDER, LYOPHILIZED, FOR SOLUTION INTRAVENOUS at 10:06

## 2018-06-30 RX ADMIN — PIPERACILLIN SODIUM AND TAZOBACTAM SODIUM 4.5 G: 4; .5 INJECTION, POWDER, LYOPHILIZED, FOR SOLUTION INTRAVENOUS at 05:06

## 2018-06-30 RX ADMIN — VANCOMYCIN HYDROCHLORIDE 1000 MG: 1 INJECTION, POWDER, LYOPHILIZED, FOR SOLUTION INTRAVENOUS at 03:06

## 2018-06-30 RX ADMIN — HYDROCODONE BITARTRATE AND ACETAMINOPHEN 1 TABLET: 5; 325 TABLET ORAL at 03:06

## 2018-06-30 RX ADMIN — HYDROCODONE BITARTRATE AND ACETAMINOPHEN 1 TABLET: 5; 325 TABLET ORAL at 05:06

## 2018-06-30 RX ADMIN — IBUPROFEN 600 MG: 600 TABLET ORAL at 04:06

## 2018-06-30 RX ADMIN — IBUPROFEN 600 MG: 600 TABLET ORAL at 01:06

## 2018-06-30 RX ADMIN — POTASSIUM CHLORIDE 40 MEQ: 20 SOLUTION ORAL at 08:06

## 2018-06-30 RX ADMIN — SODIUM CHLORIDE 1000 ML: 0.9 INJECTION, SOLUTION INTRAVENOUS at 09:06

## 2018-06-30 RX ADMIN — HYDROCODONE POLISTIREX AND CHLORPHENIRAMINE POLISITREX 5 ML: 10; 8 SUSPENSION, EXTENDED RELEASE ORAL at 07:06

## 2018-06-30 RX ADMIN — POTASSIUM CHLORIDE 40 MEQ: 20 SOLUTION ORAL at 02:06

## 2018-06-30 RX ADMIN — VANCOMYCIN HYDROCHLORIDE 1000 MG: 1 INJECTION, POWDER, LYOPHILIZED, FOR SOLUTION INTRAVENOUS at 02:06

## 2018-06-30 NOTE — ASSESSMENT & PLAN NOTE
Etiology likely related to severe pneumonia.  Treatment per above.  Malignancy unlikely, however not completely ruled out at this point.  No reported hemoptysis this admission, but did have prior to admission

## 2018-06-30 NOTE — NURSING
After discussion with Dr. Almaraz, the PRN potassium replacement orders will be fulfilled and then recheck labs in the am.

## 2018-06-30 NOTE — ASSESSMENT & PLAN NOTE
Patient with near complete opacification of the right upper lobe with severe necrotizing pneumonia and what appears to be air fluid levels within the lung.  Likely will need surgical treatment to excise this necrotizing pneumonia given the fact the patient has been on antibiotics previously without success..  Will consult with pulmonology and cardiothoracic surgery for their opinion and assess probability for patient to receive thoracotomy with partial lobectomy.  Will continue patient on broad spectrum IV antibiotics for healthcare associated pneumonia with addition of anaerobes given necrotizing component.  Defer further management to pulmonology and/or CT surgery.  It is unlikely the patient has tuberculosis based off of risk factors, however will follow up TB gold and isolate patient until lab result is back.  TB sample has been collected  Sputum culture collected, will logan abx therapy accordingly

## 2018-06-30 NOTE — PROGRESS NOTES
[facility] Hospital Medicine  Progress Note    Patient Name: Jesi Nagy  MRN: [id number]  Patient Class: IP- Inpatient   Admission Date: 6/29/2018  Length of Stay: 1 days  Attending Physician: Deondre lAmaraz MD  Primary Care Provider: Primary Doctor No        Subjective:     Principal Problem:Necrotizing pneumonia    HPI:  Patient is a 57-year-old  female with a past medical history significant for anxiety who presents to the hospital after having being referred from the clinic.  Patient had upper respiratory symptoms for approximately 6 months prior to admission.  Showed intermittent periods of cough, bloody sputum, and fever up to 102.  Denies any night sweats or weight loss.  Each time, the patient had presented to urgent care and started on antibiotics for short course in which she transiently improved but then relapsed again approximately 1-2 weeks later.  Patient has been through per her own description at least 5 courses of oral antibiotics and has continued to have intermittent spiking fevers in between regimens of antibiotics.  She sought care in the [facility] and was initially found to have an abnormality on chest x-ray and markedly elevated inflammatory markers which prompted further evaluation by CT scan of the chest.  On CT scan, the patient was found to have a right upper lobe necrotizing pneumonia.  Patient states she continues to have sharp chest pain which is exacerbated with activity and deep breathing.  She does also admit to intermittent fevers the last of which was 2 days ago.  Patient denies any nausea, vomiting, diarrhea, or other abdominal complaints.    Hospital Course:  No notes on file    Interval History: bowel movement yesterday, reported normal per patient. Rounded with nurse and care plan discussed. Will give prn K today.  Pulmonary following the patient. WBC improving with antibiotics.  CTS consulted, pending. Patient still coughing, but  no shortness of breath.  Sputum culture collected this morning.    Review of Systems   Constitutional: Negative for activity change, chills and fever.   HENT: Negative for congestion, nosebleeds, sinus pain, sore throat and trouble swallowing.    Eyes: Negative for photophobia, redness and visual disturbance.   Respiratory: Positive for cough. Negative for apnea, chest tightness, shortness of breath and wheezing.    Cardiovascular: Positive for chest pain. Negative for palpitations and leg swelling.   Gastrointestinal: Negative for abdominal distention, abdominal pain, blood in stool, constipation, diarrhea, nausea and vomiting.   Endocrine: Negative for polydipsia, polyphagia and polyuria.   Genitourinary: Negative for difficulty urinating, dysuria and urgency.   Musculoskeletal: Negative for arthralgias, back pain, joint swelling, myalgias, neck pain and neck stiffness.   Neurological: Negative for dizziness, tremors, speech difficulty, weakness, light-headedness and headaches.   Hematological: Negative for adenopathy. Does not bruise/bleed easily.   Psychiatric/Behavioral: Negative for agitation, behavioral problems, confusion and decreased concentration.     Objective:     Vital Signs (Most Recent):  Temp: 97.7 °F (36.5 °C) (06/30/18 0431)  Pulse: 72 (06/30/18 0431)  Resp: 18 (06/30/18 0431)  BP: 107/61 (06/30/18 0431)  SpO2: 100 % (06/30/18 0431) Vital Signs (24h Range):  Temp:  [96.3 °F (35.7 °C)-97.8 °F (36.6 °C)] 97.7 °F (36.5 °C)  Pulse:  [63-98] 72  Resp:  [14-18] 18  SpO2:  [97 %-100 %] 100 %  BP: (106-137)/(61-73) 107/61     Weight: 61.2 kg (135 lb)  Body mass index is 21.79 kg/m².    Intake/Output Summary (Last 24 hours) at 06/30/18 0801  Last data filed at 06/29/18 1700   Gross per 24 hour   Intake              590 ml   Output                0 ml   Net              590 ml      Physical Exam   Constitutional: She is oriented to person, place, and time. She appears well-developed and well-nourished. No  distress.   HENT:   Head: Normocephalic and atraumatic.   Mouth/Throat: No oropharyngeal exudate.   Eyes: Conjunctivae and EOM are normal. Pupils are equal, round, and reactive to light. Right eye exhibits no discharge. No scleral icterus.   Neck: Normal range of motion. Neck supple. No tracheal deviation present. No thyromegaly present.   Cardiovascular: Normal rate, regular rhythm, normal heart sounds and intact distal pulses.  Exam reveals no gallop and no friction rub.    No murmur heard.  Pulmonary/Chest: Effort normal and breath sounds normal. No respiratory distress. She has no wheezes. She has no rales.   Abdominal: Soft. Bowel sounds are normal. She exhibits no distension. There is no tenderness. There is no guarding.   Musculoskeletal: Normal range of motion. She exhibits no edema, tenderness or deformity.   Lymphadenopathy:     She has no cervical adenopathy.   Neurological: She is alert and oriented to person, place, and time. No cranial nerve deficit. She exhibits normal muscle tone.   Skin: Skin is warm and dry. Capillary refill takes less than 2 seconds. No rash noted. She is not diaphoretic. No erythema. No pallor.   Psychiatric: She has a normal mood and affect. Her behavior is normal. Judgment and thought content normal.       Significant Labs: All pertinent labs within the past 24 hours have been reviewed.    Significant Imaging: I have reviewed and interpreted all pertinent imaging results/findings within the past 24 hours.    Assessment/Plan:      * Necrotizing pneumonia    Patient with near complete opacification of the right upper lobe with severe necrotizing pneumonia and what appears to be air fluid levels within the lung.  Likely will need surgical treatment to excise this necrotizing pneumonia given the fact the patient has been on antibiotics previously without success..  Will consult with pulmonology and cardiothoracic surgery for their opinion and assess probability for patient to  receive thoracotomy with partial lobectomy.  Will continue patient on broad spectrum IV antibiotics for healthcare associated pneumonia with addition of anaerobes given necrotizing component.  Defer further management to pulmonology and/or CT surgery.  It is unlikely the patient has tuberculosis based off of risk factors, however will follow up TB gold and isolate patient until lab result is back.  TB sample has been collected  Sputum culture collected, will logan abx therapy accordingly          Hypokalemia    Patient has hypokalemia which is currently uncontrolled. Last electrolytes reviewed-   Recent Labs  Lab 06/30/18  0551   K 2.8*   . Will replace potassium and monitor electrolytes closely.             Caries    Possibly contributing to infection, will f/u blood cultures          Abscess of right lung with pneumonia    Treatment per above          Hemoptysis    Etiology likely related to severe pneumonia.  Treatment per above.  Malignancy unlikely, however not completely ruled out at this point.  No reported hemoptysis this admission, but did have prior to admission            VTE Risk Mitigation         Ordered     IP VTE LOW RISK PATIENT  Once      06/29/18 4824              Deondre Almaraz MD  Department of Hospital Medicine   [facility]

## 2018-06-30 NOTE — CONSULTS
Consult Note  Cardiothoracic Surgery    Consults  SUBJECTIVE:     History of Present Illness:  Patient is a 57 y.o. female presents with Chest discomfort, fever, productive cough    Scheduled Meds:   ibuprofen  600 mg Oral QID    piperacillin-tazobactam (ZOSYN) IVPB  4.5 g Intravenous Q6H    ramelteon  8 mg Oral QHS    vancomycin (VANCOCIN) IVPB  1,000 mg Intravenous Q12H     Infusions/Drips:   sodium chloride 0.9% 125 mL/hr at 06/30/18 1422     PRN Meds:acetaminophen, dextrose 50%, dextrose 50%, glucagon (human recombinant), glucose, glucose, HYDROcodone-acetaminophen, hydrocodone-chlorpheniramine, magnesium oxide, magnesium oxide, ondansetron, potassium chloride 10%, sodium chloride 0.9%    Review of patient's allergies indicates:  No Known Allergies    No past medical history on file.  Past Surgical History:   Procedure Laterality Date    baker's cyst removal      BREAST SURGERY      HYSTERECTOMY      KNEE SURGERY Bilateral     laproscopic surgery    SHOULDER SURGERY Left     laproscopic     Family History   Problem Relation Age of Onset    Rheum arthritis Mother     Heart disease Father     Parkinsonism Father      Social History   Substance Use Topics    Smoking status: Former Smoker     Packs/day: 1.00     Years: 30.00     Types: Cigarettes    Smokeless tobacco: Never Used      Comment: Patient now vapes    Alcohol use 4.2 oz/week     7 Glasses of wine per week      Comment: glass of wine daily          OBJECTIVE:     Vital Signs (Most Recent)  Temp: (!) 100.6 °F (38.1 °C) (06/30/18 1203)  Pulse: 92 (06/30/18 1203)  Resp: 18 (06/30/18 1203)  BP: (!) 85/51 (06/30/18 1335)  SpO2: 96 % (06/30/18 1203)    Admission Weight: 61.2 kg (135 lb) (06/29/18 1400)   Most Recent Weight: 61.2 kg (135 lb) (06/29/18 1400)    Vital Signs Range (Last 24H):  Temp:  [96.3 °F (35.7 °C)-100.6 °F (38.1 °C)]   Pulse:  [63-92]   Resp:  [14-18]   BP: ()/(51-66)   SpO2:  [96 %-100 %]     Physical Exam:  NAD  Resp:  no distress, BS  CV RRR  GI  abd  Ext no edema  Neuro grossly intact    Laboratory:  CBC:   Recent Labs  Lab 06/30/18  0551   WBC 13.50*   RBC 3.54*   HGB 11.8*   HCT 35.0*   *   MCV 99*   MCH 33.3*   MCHC 33.7     BMP:   Recent Labs  Lab 06/30/18  0551      *   K 2.8*   CL 95   CO2 29   BUN 6   CREATININE 0.7   CALCIUM 8.7   MG 2.0     Coagulation: No results for input(s): LABPROT, INR, APTT in the last 168 hours.  Microbiology Results (last 7 days)     Procedure Component Value Units Date/Time    AFB Culture & Smear [[id number]] Collected:  06/30/18 0700    Order Status:  Completed Specimen:  Respiratory from Sputum, Expectorated Updated:  06/30/18 1412     AFB CULTURE STAIN No acid fast bacilli seen.    Culture, Respiratory with Gram Stain [[id number]] Collected:  06/30/18 0700    Order Status:  Completed Specimen:  Respiratory from Sputum, Expectorated Updated:  06/30/18 1317     Gram Stain (Respiratory) <10 epithelial cells per low power field.     Gram Stain (Respiratory) No WBC's     Gram Stain (Respiratory) Many Gram positive cocci     Gram Stain (Respiratory) Many Gram negative rods    AFB Culture & Smear [[id number]] Collected:  06/30/18 0700    Order Status:  Canceled Specimen:  Respiratory from Sputum, Expectorated Updated:  06/30/18 0730    Blood culture (site 2) [[id number]] Collected:  06/29/18 1328    Order Status:  Completed Specimen:  Blood from Antecubital, Left  Arm Updated:  06/30/18 0515     Blood Culture, Routine No Growth to date    Narrative:       Site # 2, aerobic only    Blood culture (site 1) [[id number]] Collected:  06/29/18 1325    Order Status:  Completed Specimen:  Blood Updated:  06/30/18 0515     Blood Culture, Routine No Growth to date    Narrative:       Site # 1, aerobic and anaerobic          Diagnostic Results:  X-Ray: Reviewed  CT: Reviewed  Echo: Reviewed    ASSESSMENT/PLAN:     Impression : Necrotizing pneumonia    Recommendation :  May require lobectomy to  control if fails to clear with aggressive medical management.  All questions answered  Will discuss further with Dr Peralta.    Thank you for the consult

## 2018-06-30 NOTE — PROGRESS NOTES
Progress Note  PULMONARY    Admit Date: 6/29/2018 06/30/2018      SUBJECTIVE:     June 30, still poor appetite, ambulated in room, cough less and sl better      PFSH and Allergies reviewed.    OBJECTIVE:     Vitals (Most recent):  Vitals:    06/30/18 1541   BP: 100/63   Pulse: 76   Resp: 16   Temp: 96.7 °F (35.9 °C)       Vitals (24 hour range):  Temp:  [96.7 °F (35.9 °C)-100.6 °F (38.1 °C)]   Pulse:  [63-92]   Resp:  [14-18]   BP: ()/(51-66)   SpO2:  [96 %-100 %]       Intake/Output Summary (Last 24 hours) at 06/30/18 1614  Last data filed at 06/30/18 1500   Gross per 24 hour   Intake             1385 ml   Output                0 ml   Net             1385 ml          Physical Exam:  The patient's neuro status (alertness,orientation,cognitive function,motor skills,), pharyngeal exam (oral lesions, hygiene, abn dentition,), Neck (jvd,mass,thyroid,nodes in neck and above/below clavicle),RESPIRATORY(symmetry,effort,fremitus,percussion,auscultation),  Cor(rhythm,heart tones including gallops,perfusion,edema)ABD(distention,hepatic&splenomegaly,tenderness,masses), Skin(rash,cyanosis),Psyc(affect,judgement,).  Exam negative except for these pertinent findings:    Alert,no distress , chest is symmetric, no distress, normal percussion, normal fremitus and good normal breath sounds  No edeam     Radiographs reviewed: view by direct vision  No new  ]    Labs       Recent Labs  Lab 06/30/18  0551   WBC 13.50*   HGB 11.8*   HCT 35.0*   *     Recent Labs  Lab 06/30/18  0551   *   K 2.8*   CL 95   CO2 29   BUN 6   CREATININE 0.7      CALCIUM 8.7   MG 2.0   PHOS 3.4   AST 52*   ALT 66*   ALKPHOS 93   BILITOT 0.5   PROT 6.3   ALBUMIN 2.0*   No results for input(s): PH, PCO2, PO2, HCO3 in the last 24 hours.  Microbiology Results (last 7 days)     Procedure Component Value Units Date/Time    AFB Culture & Smear [[id number]] Collected:  06/30/18 0700    Order Status:  Completed Specimen:  Respiratory from  Sputum, Expectorated Updated:  06/30/18 1412     AFB CULTURE STAIN No acid fast bacilli seen.    Culture, Respiratory with Gram Stain [[id number]] Collected:  06/30/18 0700    Order Status:  Completed Specimen:  Respiratory from Sputum, Expectorated Updated:  06/30/18 1317     Gram Stain (Respiratory) <10 epithelial cells per low power field.     Gram Stain (Respiratory) No WBC's     Gram Stain (Respiratory) Many Gram positive cocci     Gram Stain (Respiratory) Many Gram negative rods    AFB Culture & Smear [[id number]] Collected:  06/30/18 0700    Order Status:  Canceled Specimen:  Respiratory from Sputum, Expectorated Updated:  06/30/18 0730    Blood culture (site 2) [[id number]] Collected:  06/29/18 1328    Order Status:  Completed Specimen:  Blood from Antecubital, Left  Arm Updated:  06/30/18 0515     Blood Culture, Routine No Growth to date    Narrative:       Site # 2, aerobic only    Blood culture (site 1) [[id number]] Collected:  06/29/18 1325    Order Status:  Completed Specimen:  Blood Updated:  06/30/18 0515     Blood Culture, Routine No Growth to date    Narrative:       Site # 1, aerobic and anaerobic          Impression:  Active Hospital Problems    Diagnosis  POA    *Necrotizing pneumonia [J85.0]  Yes    Hypokalemia [E87.6]  No    Hemoptysis [R04.2]  Yes    Abscess of right lung with pneumonia [J85.1]  Yes    Caries [K02.9]  Yes      Resolved Hospital Problems    Diagnosis Date Resolved POA   No resolved problems to display.               Plan:     June 30, gpc and gnr in sputum, discussed with Dr tyson.  Good chance will clear with abx alone. No rush to surgery.                                    .

## 2018-06-30 NOTE — PLAN OF CARE
Problem: Patient Care Overview  Goal: Plan of Care Review  Outcome: Ongoing (interventions implemented as appropriate)  Plan of care reviewed with patient. Iv antibiotics given as per orders. Droplet isolation maintained. Medicated for pain once this shift. Moderate relief obtained. Remains free from falls/injury. Instructed to call for assistance as needed . Verbalized understanding. Call light in reach. Bed in low & locked position.

## 2018-06-30 NOTE — SUBJECTIVE & OBJECTIVE
Interval History: bowel movement yesterday, reported normal per patient. Rounded with nurse and care plan discussed. Will give prn K today.  Pulmonary following the patient. WBC improving with antibiotics.  CTS consulted, pending. Patient still coughing, but no shortness of breath.  Sputum culture collected this morning.    Review of Systems   Constitutional: Negative for activity change, chills and fever.   HENT: Negative for congestion, nosebleeds, sinus pain, sore throat and trouble swallowing.    Eyes: Negative for photophobia, redness and visual disturbance.   Respiratory: Positive for cough. Negative for apnea, chest tightness, shortness of breath and wheezing.    Cardiovascular: Positive for chest pain. Negative for palpitations and leg swelling.   Gastrointestinal: Negative for abdominal distention, abdominal pain, blood in stool, constipation, diarrhea, nausea and vomiting.   Endocrine: Negative for polydipsia, polyphagia and polyuria.   Genitourinary: Negative for difficulty urinating, dysuria and urgency.   Musculoskeletal: Negative for arthralgias, back pain, joint swelling, myalgias, neck pain and neck stiffness.   Neurological: Negative for dizziness, tremors, speech difficulty, weakness, light-headedness and headaches.   Hematological: Negative for adenopathy. Does not bruise/bleed easily.   Psychiatric/Behavioral: Negative for agitation, behavioral problems, confusion and decreased concentration.     Objective:     Vital Signs (Most Recent):  Temp: 97.7 °F (36.5 °C) (06/30/18 0431)  Pulse: 72 (06/30/18 0431)  Resp: 18 (06/30/18 0431)  BP: 107/61 (06/30/18 0431)  SpO2: 100 % (06/30/18 0431) Vital Signs (24h Range):  Temp:  [96.3 °F (35.7 °C)-97.8 °F (36.6 °C)] 97.7 °F (36.5 °C)  Pulse:  [63-98] 72  Resp:  [14-18] 18  SpO2:  [97 %-100 %] 100 %  BP: (106-137)/(61-73) 107/61     Weight: 61.2 kg (135 lb)  Body mass index is 21.79 kg/m².    Intake/Output Summary (Last 24 hours) at 06/30/18 0801  Last data  filed at 06/29/18 1700   Gross per 24 hour   Intake              590 ml   Output                0 ml   Net              590 ml      Physical Exam   Constitutional: She is oriented to person, place, and time. She appears well-developed and well-nourished. No distress.   HENT:   Head: Normocephalic and atraumatic.   Mouth/Throat: No oropharyngeal exudate.   Eyes: Conjunctivae and EOM are normal. Pupils are equal, round, and reactive to light. Right eye exhibits no discharge. No scleral icterus.   Neck: Normal range of motion. Neck supple. No tracheal deviation present. No thyromegaly present.   Cardiovascular: Normal rate, regular rhythm, normal heart sounds and intact distal pulses.  Exam reveals no gallop and no friction rub.    No murmur heard.  Pulmonary/Chest: Effort normal and breath sounds normal. No respiratory distress. She has no wheezes. She has no rales.   Abdominal: Soft. Bowel sounds are normal. She exhibits no distension. There is no tenderness. There is no guarding.   Musculoskeletal: Normal range of motion. She exhibits no edema, tenderness or deformity.   Lymphadenopathy:     She has no cervical adenopathy.   Neurological: She is alert and oriented to person, place, and time. No cranial nerve deficit. She exhibits normal muscle tone.   Skin: Skin is warm and dry. Capillary refill takes less than 2 seconds. No rash noted. She is not diaphoretic. No erythema. No pallor.   Psychiatric: She has a normal mood and affect. Her behavior is normal. Judgment and thought content normal.       Significant Labs: All pertinent labs within the past 24 hours have been reviewed.    Significant Imaging: I have reviewed and interpreted all pertinent imaging results/findings within the past 24 hours.

## 2018-06-30 NOTE — PLAN OF CARE
Problem: Patient Care Overview  Goal: Individualization & Mutuality  Outcome: Ongoing (interventions implemented as appropriate)  Pt remained free of falls, injuries, or trauma during shift. Fall precautions maintained throughout shift. Bed kept in lowest position, locked. Call light within reach. Fall risk band on. Airborne precautions maintained. Educated family, insistent they have already been around her enough. Pt showed no new s/s of skin breakdown during shift. Pt repositions independently, ambulates independently .Pt rounded on hourly.No acute events throughout the shift. VS and assessment performed per orders. Hypotensive, febrile, treated for such issues per Dr. Almaraz. Patient progressing towards goals as tolerated. Plan of care reviewed with pt, all concerns addressed. Will continue to monitor.     Problem: Pain, Acute (Adult)  Goal: Identify Related Risk Factors and Signs and Symptoms  Related risk factors and signs and symptoms are identified upon initiation of Human Response Clinical Practice Guideline (CPG)   Outcome: Ongoing (interventions implemented as appropriate)  Medicated for pain this am. Unable to give narcotics due to hypotension.

## 2018-06-30 NOTE — NURSING
Pt is hypotensive with low grade temp. Notfied Dr. Almaraz who ordered new IV fluids. Ibuprofen given per orders. Pt requesting narcotic pain pill, told her that Dr. Almaraz and I both cannot let her get the pill with that low of a pressure. Will continue to monitor.

## 2018-06-30 NOTE — CONSULTS
Chart and films reviewed  Pulmonary not recommending resection at this time  Will discuss further with Dr Peralta and see patient when he feels surgery may be indicated

## 2018-06-30 NOTE — ASSESSMENT & PLAN NOTE
Patient has hypokalemia which is currently uncontrolled. Last electrolytes reviewed-   Recent Labs  Lab 06/30/18  0551   K 2.8*   . Will replace potassium and monitor electrolytes closely.

## 2018-07-01 LAB
ALBUMIN SERPL BCP-MCNC: 1.7 G/DL
ALP SERPL-CCNC: 76 U/L
ALT SERPL W/O P-5'-P-CCNC: 44 U/L
ANION GAP SERPL CALC-SCNC: 7 MMOL/L
AST SERPL-CCNC: 39 U/L
BASOPHILS # BLD AUTO: 0 K/UL
BASOPHILS NFR BLD: 0.2 %
BILIRUB SERPL-MCNC: 0.4 MG/DL
BUN SERPL-MCNC: 5 MG/DL
CALCIUM SERPL-MCNC: 8 MG/DL
CHLORIDE SERPL-SCNC: 107 MMOL/L
CO2 SERPL-SCNC: 25 MMOL/L
CREAT SERPL-MCNC: 0.6 MG/DL
DIFFERENTIAL METHOD: ABNORMAL
EOSINOPHIL # BLD AUTO: 0 K/UL
EOSINOPHIL NFR BLD: 0.1 %
ERYTHROCYTE [DISTWIDTH] IN BLOOD BY AUTOMATED COUNT: 16.1 %
EST. GFR  (AFRICAN AMERICAN): >60 ML/MIN/1.73 M^2
EST. GFR  (NON AFRICAN AMERICAN): >60 ML/MIN/1.73 M^2
GLUCOSE SERPL-MCNC: 111 MG/DL
HCT VFR BLD AUTO: 33.4 %
HGB BLD-MCNC: 11.3 G/DL
LYMPHOCYTES # BLD AUTO: 0.9 K/UL
LYMPHOCYTES NFR BLD: 6.3 %
MAGNESIUM SERPL-MCNC: 1.7 MG/DL
MCH RBC QN AUTO: 33.7 PG
MCHC RBC AUTO-ENTMCNC: 33.7 G/DL
MCV RBC AUTO: 100 FL
MONOCYTES # BLD AUTO: 0.8 K/UL
MONOCYTES NFR BLD: 5.7 %
NEUTROPHILS # BLD AUTO: 12.3 K/UL
NEUTROPHILS NFR BLD: 87.7 %
PHOSPHATE SERPL-MCNC: 3 MG/DL
PLATELET # BLD AUTO: 429 K/UL
PMV BLD AUTO: 9 FL
POTASSIUM SERPL-SCNC: 3.4 MMOL/L
PROT SERPL-MCNC: 5.6 G/DL
RBC # BLD AUTO: 3.34 M/UL
SODIUM SERPL-SCNC: 139 MMOL/L
TB INDURATION 48 - 72 HR READ: 0 MM
VANCOMYCIN TROUGH SERPL-MCNC: 8.9 UG/ML
WBC # BLD AUTO: 14.1 K/UL

## 2018-07-01 PROCEDURE — 94761 N-INVAS EAR/PLS OXIMETRY MLT: CPT

## 2018-07-01 PROCEDURE — 80202 ASSAY OF VANCOMYCIN: CPT

## 2018-07-01 PROCEDURE — 86703 HIV-1/HIV-2 1 RESULT ANTBDY: CPT

## 2018-07-01 PROCEDURE — 85025 COMPLETE CBC W/AUTO DIFF WBC: CPT

## 2018-07-01 PROCEDURE — 25000003 PHARM REV CODE 250: Performed by: HOSPITALIST

## 2018-07-01 PROCEDURE — 11000001 HC ACUTE MED/SURG PRIVATE ROOM

## 2018-07-01 PROCEDURE — 27100240 HC SENSOR, NONINVASIVE CARDIAC OUTPUT

## 2018-07-01 PROCEDURE — 63600175 PHARM REV CODE 636 W HCPCS: Performed by: INTERNAL MEDICINE

## 2018-07-01 PROCEDURE — 83735 ASSAY OF MAGNESIUM: CPT

## 2018-07-01 PROCEDURE — 25000003 PHARM REV CODE 250: Performed by: INTERNAL MEDICINE

## 2018-07-01 PROCEDURE — 80053 COMPREHEN METABOLIC PANEL: CPT

## 2018-07-01 PROCEDURE — 84100 ASSAY OF PHOSPHORUS: CPT

## 2018-07-01 PROCEDURE — 25000003 PHARM REV CODE 250: Performed by: NURSE PRACTITIONER

## 2018-07-01 PROCEDURE — 99232 SBSQ HOSP IP/OBS MODERATE 35: CPT | Mod: ,,, | Performed by: INTERNAL MEDICINE

## 2018-07-01 PROCEDURE — 36415 COLL VENOUS BLD VENIPUNCTURE: CPT

## 2018-07-01 RX ORDER — KETOROLAC TROMETHAMINE 30 MG/ML
15 INJECTION, SOLUTION INTRAMUSCULAR; INTRAVENOUS ONCE
Status: COMPLETED | OUTPATIENT
Start: 2018-07-01 | End: 2018-07-01

## 2018-07-01 RX ORDER — MIDODRINE HYDROCHLORIDE 2.5 MG/1
5 TABLET ORAL 2 TIMES DAILY WITH MEALS
Status: DISCONTINUED | OUTPATIENT
Start: 2018-07-01 | End: 2018-07-01

## 2018-07-01 RX ORDER — PREDNISONE 5 MG/1
5 TABLET ORAL 2 TIMES DAILY
Status: DISCONTINUED | OUTPATIENT
Start: 2018-07-01 | End: 2018-07-02

## 2018-07-01 RX ORDER — HYDROCODONE BITARTRATE AND ACETAMINOPHEN 5; 325 MG/1; MG/1
1 TABLET ORAL EVERY 6 HOURS PRN
Status: DISCONTINUED | OUTPATIENT
Start: 2018-07-01 | End: 2018-07-02

## 2018-07-01 RX ORDER — MIDODRINE HYDROCHLORIDE 2.5 MG/1
5 TABLET ORAL 2 TIMES DAILY WITH MEALS
Status: COMPLETED | OUTPATIENT
Start: 2018-07-01 | End: 2018-07-03

## 2018-07-01 RX ADMIN — POTASSIUM CHLORIDE 40 MEQ: 20 SOLUTION ORAL at 11:07

## 2018-07-01 RX ADMIN — MAGNESIUM OXIDE TAB 400 MG (241.3 MG ELEMENTAL MG) 800 MG: 400 (241.3 MG) TAB at 11:07

## 2018-07-01 RX ADMIN — KETOROLAC TROMETHAMINE 15 MG: 30 INJECTION, SOLUTION INTRAMUSCULAR at 10:07

## 2018-07-01 RX ADMIN — IBUPROFEN 600 MG: 600 TABLET ORAL at 01:07

## 2018-07-01 RX ADMIN — SODIUM CHLORIDE 1000 ML: 0.9 INJECTION, SOLUTION INTRAVENOUS at 01:07

## 2018-07-01 RX ADMIN — PIPERACILLIN SODIUM AND TAZOBACTAM SODIUM 4.5 G: 4; .5 INJECTION, POWDER, LYOPHILIZED, FOR SOLUTION INTRAVENOUS at 10:07

## 2018-07-01 RX ADMIN — RAMELTEON 8 MG: 8 TABLET, FILM COATED ORAL at 12:07

## 2018-07-01 RX ADMIN — PIPERACILLIN SODIUM AND TAZOBACTAM SODIUM 4.5 G: 4; .5 INJECTION, POWDER, LYOPHILIZED, FOR SOLUTION INTRAVENOUS at 04:07

## 2018-07-01 RX ADMIN — IBUPROFEN 600 MG: 600 TABLET ORAL at 08:07

## 2018-07-01 RX ADMIN — ACETAMINOPHEN 1000 MG: 500 TABLET, FILM COATED ORAL at 07:07

## 2018-07-01 RX ADMIN — HYDROCODONE POLISTIREX AND CHLORPHENIRAMINE POLISITREX 5 ML: 10; 8 SUSPENSION, EXTENDED RELEASE ORAL at 05:07

## 2018-07-01 RX ADMIN — IBUPROFEN 600 MG: 600 TABLET ORAL at 04:07

## 2018-07-01 RX ADMIN — VANCOMYCIN HYDROCHLORIDE 1500 MG: 1 INJECTION, POWDER, LYOPHILIZED, FOR SOLUTION INTRAVENOUS at 06:07

## 2018-07-01 RX ADMIN — MIDODRINE HYDROCHLORIDE 5 MG: 2.5 TABLET ORAL at 01:07

## 2018-07-01 RX ADMIN — HYDROCODONE BITARTRATE AND ACETAMINOPHEN 1 TABLET: 5; 325 TABLET ORAL at 08:07

## 2018-07-01 RX ADMIN — RAMELTEON 8 MG: 8 TABLET, FILM COATED ORAL at 08:07

## 2018-07-01 RX ADMIN — POTASSIUM CHLORIDE 40 MEQ: 20 SOLUTION ORAL at 06:07

## 2018-07-01 RX ADMIN — PIPERACILLIN SODIUM AND TAZOBACTAM SODIUM 4.5 G: 4; .5 INJECTION, POWDER, LYOPHILIZED, FOR SOLUTION INTRAVENOUS at 11:07

## 2018-07-01 RX ADMIN — VANCOMYCIN HYDROCHLORIDE 1500 MG: 1 INJECTION, POWDER, LYOPHILIZED, FOR SOLUTION INTRAVENOUS at 05:07

## 2018-07-01 RX ADMIN — SODIUM CHLORIDE 1000 ML: 0.9 INJECTION, SOLUTION INTRAVENOUS at 11:07

## 2018-07-01 RX ADMIN — PREDNISONE 5 MG: 5 TABLET ORAL at 11:07

## 2018-07-01 RX ADMIN — PREDNISONE 5 MG: 5 TABLET ORAL at 08:07

## 2018-07-01 RX ADMIN — MAGNESIUM OXIDE TAB 400 MG (241.3 MG ELEMENTAL MG) 800 MG: 400 (241.3 MG) TAB at 06:07

## 2018-07-01 NOTE — PROGRESS NOTES
Progress Note  PULMONARY    Admit Date: 6/29/2018 07/01/2018      SUBJECTIVE:     June 30, still poor appetite, ambulated in room, cough less and sl better  Ju;y 1, still poor appetite, ambulates room, violent cough with dark mucous.    PFSH and Allergies reviewed.    OBJECTIVE:     Vitals (Most recent):  Vitals:    07/01/18 0800   BP: (!) 98/55   Pulse: 83   Resp: 20   Temp: 98.6 °F (37 °C)       Vitals (24 hour range):  Temp:  [96.4 °F (35.8 °C)-100.6 °F (38.1 °C)]   Pulse:  [72-92]   Resp:  [16-20]   BP: ()/(51-63)   SpO2:  [96 %-97 %]       Intake/Output Summary (Last 24 hours) at 07/01/18 0958  Last data filed at 07/01/18 0600   Gross per 24 hour   Intake             4250 ml   Output                0 ml   Net             4250 ml          Physical Exam:  The patient's neuro status (alertness,orientation,cognitive function,motor skills,), pharyngeal exam (oral lesions, hygiene, abn dentition,), Neck (jvd,mass,thyroid,nodes in neck and above/below clavicle),RESPIRATORY(symmetry,effort,fremitus,percussion,auscultation),  Cor(rhythm,heart tones including gallops,perfusion,edema)ABD(distention,hepatic&splenomegaly,tenderness,masses), Skin(rash,cyanosis),Psyc(affect,judgement,).  Exam negative except for these pertinent findings:    Alert,no distress , chest is symmetric, no distress, normal percussion, normal fremitus and good normal breath sounds  No edeam     Radiographs reviewed: view by direct vision  No new       Labs       Recent Labs  Lab 07/01/18  0440   WBC 14.10*   HGB 11.3*   HCT 33.4*   *       Recent Labs  Lab 07/01/18  0440      K 3.4*      CO2 25   BUN 5*   CREATININE 0.6   *   CALCIUM 8.0*   MG 1.7   PHOS 3.0   AST 39   ALT 44   ALKPHOS 76   BILITOT 0.4   PROT 5.6*   ALBUMIN 1.7*   No results for input(s): PH, PCO2, PO2, HCO3 in the last 24 hours.  Microbiology Results (last 7 days)     Procedure Component Value Units Date/Time    AFB Culture & Smear [[id number]]      Order Status:  No result Specimen:  Respiratory from Sputum, Expectorated     Blood culture (site 1) [[id number]] Collected:  06/29/18 1325    Order Status:  Completed Specimen:  Blood Updated:  06/30/18 2212     Blood Culture, Routine No Growth to date     Blood Culture, Routine No Growth to date    Narrative:       Site # 1, aerobic and anaerobic    Blood culture (site 2) [[id number]] Collected:  06/29/18 1328    Order Status:  Completed Specimen:  Blood from Antecubital, Left  Arm Updated:  06/30/18 2212     Blood Culture, Routine No Growth to date     Blood Culture, Routine No Growth to date    Narrative:       Site # 2, aerobic only    AFB Culture & Smear [[id number]] Collected:  06/30/18 0700    Order Status:  Completed Specimen:  Respiratory from Sputum, Expectorated Updated:  06/30/18 1412     AFB CULTURE STAIN No acid fast bacilli seen.    Culture, Respiratory with Gram Stain [[id number]] Collected:  06/30/18 0700    Order Status:  Completed Specimen:  Respiratory from Sputum, Expectorated Updated:  06/30/18 1317     Gram Stain (Respiratory) <10 epithelial cells per low power field.     Gram Stain (Respiratory) No WBC's     Gram Stain (Respiratory) Many Gram positive cocci     Gram Stain (Respiratory) Many Gram negative rods    AFB Culture & Smear [[id number]] Collected:  06/30/18 0700    Order Status:  Canceled Specimen:  Respiratory from Sputum, Expectorated Updated:  06/30/18 0730          Impression:  Active Hospital Problems    Diagnosis  POA    *Necrotizing pneumonia [J85.0]  Yes    Hypokalemia [E87.6]  No    Hemoptysis [R04.2]  Yes    Abscess of right lung with pneumonia [J85.1]  Yes    Caries [K02.9]  Yes      Resolved Hospital Problems    Diagnosis Date Resolved POA   No resolved problems to display.               Plan:     June 30, gpc and gnr in sputum, discussed with Dr villeda and harriet.  Good chance will clear with abx alone. No rush to surgery.    July 1, reviewed cxr - lung abscess are in  both rul and rll, no large cavities but lots of sm cavities- suspect will be slow to control with abx.  Pt having problems with violent coughing and mobilizes some purulent mucous - abscesses are not draining well.  bp low likely lingering sepsis.  MAP was 60 with smp 75 this am.  Discussed with Dr Almaraz.  Consider Chetta monitoring and more fluid boluses, will oreder low dose steroids and midodrine.  F/u cxr.  May need icu.  Hoping medical rx will work.  Sputum pending.                                .

## 2018-07-01 NOTE — ASSESSMENT & PLAN NOTE
Patient has hypokalemia which is currently controlled. Last electrolytes reviewed-     Recent Labs  Lab 06/30/18  0551 07/01/18  0440   K 2.8* 3.4*   . Will replace potassium and monitor electrolytes closely.

## 2018-07-01 NOTE — PLAN OF CARE
Problem: Patient Care Overview  Goal: Plan of Care Review  Outcome: Ongoing (interventions implemented as appropriate)  02 saturation 97% on room air.

## 2018-07-01 NOTE — PROGRESS NOTES
[facility] Hospital Medicine  Progress Note    Patient Name: Jesi Nagy  MRN: [id number]  Patient Class: IP- Inpatient   Admission Date: 6/29/2018  Length of Stay: 2 days  Attending Physician: Deondre Almaraz MD  Primary Care Provider: Primary Doctor No        Subjective:     Principal Problem:Necrotizing pneumonia    HPI:  Patient is a 57-year-old  female with a past medical history significant for anxiety who presents to the hospital after having being referred from the clinic.  Patient had upper respiratory symptoms for approximately 6 months prior to admission.  Showed intermittent periods of cough, bloody sputum, and fever up to 102.  Denies any night sweats or weight loss.  Each time, the patient had presented to urgent care and started on antibiotics for short course in which she transiently improved but then relapsed again approximately 1-2 weeks later.  Patient has been through per her own description at least 5 courses of oral antibiotics and has continued to have intermittent spiking fevers in between regimens of antibiotics.  She sought care in the [facility] and was initially found to have an abnormality on chest x-ray and markedly elevated inflammatory markers which prompted further evaluation by CT scan of the chest.  On CT scan, the patient was found to have a right upper lobe necrotizing pneumonia.  Patient states she continues to have sharp chest pain which is exacerbated with activity and deep breathing.  She does also admit to intermittent fevers the last of which was 2 days ago.  Patient denies any nausea, vomiting, diarrhea, or other abdominal complaints.    Hospital Course:  No notes on file    Interval History: Still complaining of cough with sputum.  Complaining of chest wall pain worse with coughing. No fever or chills. Still with low blood pressures. Had normal bowel movement yesterday.    Review of Systems   Constitutional: Negative for  activity change, appetite change, chills, diaphoresis, fatigue and fever.   HENT: Negative for congestion, facial swelling, sinus pain, sinus pressure, sore throat and trouble swallowing.    Eyes: Negative for photophobia, redness and visual disturbance.   Respiratory: Positive for cough. Negative for apnea, chest tightness, shortness of breath and wheezing.    Cardiovascular: Negative for chest pain, palpitations and leg swelling.   Gastrointestinal: Negative for abdominal distention, abdominal pain, constipation, diarrhea and nausea.   Genitourinary: Negative for dysuria.   Musculoskeletal: Negative for arthralgias, back pain, joint swelling, myalgias and neck stiffness.   Skin: Negative for color change, pallor and rash.   Psychiatric/Behavioral: Negative for agitation, behavioral problems, confusion and decreased concentration.     Objective:     Vital Signs (Most Recent):  Temp: 98.6 °F (37 °C) (07/01/18 0800)  Pulse: 83 (07/01/18 0800)  Resp: 20 (07/01/18 0800)  BP: (!) 98/55 (07/01/18 0800)  SpO2: 97 % (07/01/18 0800) Vital Signs (24h Range):  Temp:  [96.4 °F (35.8 °C)-100.6 °F (38.1 °C)] 98.6 °F (37 °C)  Pulse:  [72-92] 83  Resp:  [16-20] 20  SpO2:  [96 %-97 %] 97 %  BP: ()/(51-63) 98/55     Weight: 63.9 kg (140 lb 14 oz)  Body mass index is 22.74 kg/m².    Intake/Output Summary (Last 24 hours) at 07/01/18 0932  Last data filed at 07/01/18 0600   Gross per 24 hour   Intake             4250 ml   Output                0 ml   Net             4250 ml      Physical Exam   Constitutional: She is oriented to person, place, and time. She appears well-developed and well-nourished. No distress.   HENT:   Head: Normocephalic and atraumatic.   Mouth/Throat: No oropharyngeal exudate.   Eyes: Conjunctivae and EOM are normal. Pupils are equal, round, and reactive to light. Right eye exhibits no discharge. No scleral icterus.   Neck: Normal range of motion. Neck supple. No tracheal deviation present. No thyromegaly  present.   Cardiovascular: Normal rate, regular rhythm, normal heart sounds and intact distal pulses.  Exam reveals no gallop and no friction rub.    No murmur heard.  Pulmonary/Chest: Effort normal. No respiratory distress. She has no wheezes. She has rales. She exhibits tenderness.   Abdominal: Soft. Bowel sounds are normal. She exhibits no distension. There is no tenderness. There is no guarding.   Musculoskeletal: Normal range of motion. She exhibits no edema, tenderness or deformity.   Lymphadenopathy:     She has no cervical adenopathy.   Neurological: She is alert and oriented to person, place, and time. No cranial nerve deficit. She exhibits normal muscle tone.   Skin: Skin is warm and dry. Capillary refill takes less than 2 seconds. No rash noted. She is not diaphoretic. No erythema. No pallor.   Psychiatric: She has a normal mood and affect. Her behavior is normal. Judgment and thought content normal.       Significant Labs: All pertinent labs within the past 24 hours have been reviewed.    Significant Imaging: I have reviewed and interpreted all pertinent imaging results/findings within the past 24 hours.    Assessment/Plan:      * Necrotizing pneumonia    Patient with near complete opacification of the right upper lobe with severe necrotizing pneumonia and what appears to be air fluid levels within the lung.  Likely will need surgical treatment to excise this necrotizing pneumonia given the fact the patient has been on antibiotics previously without success. Pulmonology and cardiothoracic surgery Consulted.  Will continue patient on broad spectrum IV antibiotics for healthcare associated pneumonia with addition of anaerobes given necrotizing component.  Defer further management to pulmonology and/or CT surgery.   TB sample has been collected, pending  Sputum culture collected, will logan abx therapy accordingly  Will continue anti-inflammatories for pain, considering marginal blood pressures.         Hypokalemia    Patient has hypokalemia which is currently controlled. Last electrolytes reviewed-     Recent Labs  Lab 06/30/18  0551 07/01/18  0440   K 2.8* 3.4*   . Will replace potassium and monitor electrolytes closely.             Caries    Possibly contributing to infection, will f/u blood cultures          Abscess of right lung with pneumonia    Treatment per above          Hemoptysis    Etiology likely related to severe pneumonia.  Treatment per above.  Malignancy unlikely, however not completely ruled out at this point.  No reported hemoptysis this admission, but did have prior to admission            VTE Risk Mitigation         Ordered     IP VTE LOW RISK PATIENT  Once      06/29/18 1243              Deondre Almaraz MD  Department of Hospital Medicine   [facility]

## 2018-07-01 NOTE — NURSING
2135: BP 84/53 (64). No s/s noted. STERLING Victor NP notified. Orders placed and carried out.   2357: Bolus completed. BP 84/54 (63). NP notified. Second bolus ordered. To be carried out. Will continue to monitor.

## 2018-07-01 NOTE — SUBJECTIVE & OBJECTIVE
Interval History: Still complaining of cough with sputum.  Complaining of chest wall pain worse with coughing. No fever or chills. Still with low blood pressures. Had normal bowel movement yesterday.    Review of Systems   Constitutional: Negative for activity change, appetite change, chills, diaphoresis, fatigue and fever.   HENT: Negative for congestion, facial swelling, sinus pain, sinus pressure, sore throat and trouble swallowing.    Eyes: Negative for photophobia, redness and visual disturbance.   Respiratory: Positive for cough. Negative for apnea, chest tightness, shortness of breath and wheezing.    Cardiovascular: Negative for chest pain, palpitations and leg swelling.   Gastrointestinal: Negative for abdominal distention, abdominal pain, constipation, diarrhea and nausea.   Genitourinary: Negative for dysuria.   Musculoskeletal: Negative for arthralgias, back pain, joint swelling, myalgias and neck stiffness.   Skin: Negative for color change, pallor and rash.   Psychiatric/Behavioral: Negative for agitation, behavioral problems, confusion and decreased concentration.     Objective:     Vital Signs (Most Recent):  Temp: 98.6 °F (37 °C) (07/01/18 0800)  Pulse: 83 (07/01/18 0800)  Resp: 20 (07/01/18 0800)  BP: (!) 98/55 (07/01/18 0800)  SpO2: 97 % (07/01/18 0800) Vital Signs (24h Range):  Temp:  [96.4 °F (35.8 °C)-100.6 °F (38.1 °C)] 98.6 °F (37 °C)  Pulse:  [72-92] 83  Resp:  [16-20] 20  SpO2:  [96 %-97 %] 97 %  BP: ()/(51-63) 98/55     Weight: 63.9 kg (140 lb 14 oz)  Body mass index is 22.74 kg/m².    Intake/Output Summary (Last 24 hours) at 07/01/18 0932  Last data filed at 07/01/18 0600   Gross per 24 hour   Intake             4250 ml   Output                0 ml   Net             4250 ml      Physical Exam   Constitutional: She is oriented to person, place, and time. She appears well-developed and well-nourished. No distress.   HENT:   Head: Normocephalic and atraumatic.   Mouth/Throat: No  oropharyngeal exudate.   Eyes: Conjunctivae and EOM are normal. Pupils are equal, round, and reactive to light. Right eye exhibits no discharge. No scleral icterus.   Neck: Normal range of motion. Neck supple. No tracheal deviation present. No thyromegaly present.   Cardiovascular: Normal rate, regular rhythm, normal heart sounds and intact distal pulses.  Exam reveals no gallop and no friction rub.    No murmur heard.  Pulmonary/Chest: Effort normal. No respiratory distress. She has no wheezes. She has rales. She exhibits tenderness.   Abdominal: Soft. Bowel sounds are normal. She exhibits no distension. There is no tenderness. There is no guarding.   Musculoskeletal: Normal range of motion. She exhibits no edema, tenderness or deformity.   Lymphadenopathy:     She has no cervical adenopathy.   Neurological: She is alert and oriented to person, place, and time. No cranial nerve deficit. She exhibits normal muscle tone.   Skin: Skin is warm and dry. Capillary refill takes less than 2 seconds. No rash noted. She is not diaphoretic. No erythema. No pallor.   Psychiatric: She has a normal mood and affect. Her behavior is normal. Judgment and thought content normal.       Significant Labs: All pertinent labs within the past 24 hours have been reviewed.    Significant Imaging: I have reviewed and interpreted all pertinent imaging results/findings within the past 24 hours.

## 2018-07-01 NOTE — ASSESSMENT & PLAN NOTE
Patient with near complete opacification of the right upper lobe with severe necrotizing pneumonia and what appears to be air fluid levels within the lung.  Likely will need surgical treatment to excise this necrotizing pneumonia given the fact the patient has been on antibiotics previously without success. Pulmonology and cardiothoracic surgery Consulted.  Will continue patient on broad spectrum IV antibiotics for healthcare associated pneumonia with addition of anaerobes given necrotizing component.  Defer further management to pulmonology and/or CT surgery.   TB sample has been collected, pending  Sputum culture collected, will logan abx therapy accordingly

## 2018-07-01 NOTE — SIGNIFICANT EVENT
Non-invasive hemodynamic measurements show no responsive to fluid bolus.  She does however have good CO and CI and SVI. HR 70 and MAP 65.  She is asymptomatic and shows no signs of end organ damage at this point.  Orthostatics negative.  Will add prednisone and midodrine today and monitor response.  If MAP consistently below 60, will consider transfer to ICU.

## 2018-07-01 NOTE — PROGRESS NOTES
1130- called by Som ADAMS, Charge nurse on MS[facility]- Dr Almaraz wants a noninvasive hemodynamic measurement on pt in 204 to determine fluid responsiveness status. [employer] device brought to pt's bedside as requested.  1137- pt in supine position with HOB positioned to approx 20 degrees. Cheetah pads put into position, baseline values: CO- 4.8/ CI- 2.8, SV- 66.7/ SVI 39. HR 71 MAP- 64.  1139- 250cc saline bolus started as ordered by Dr Almaraz to right anterior PIV/ ended 1044.  1145- SVI change 7% after bolus indicating pt is not fluid responsive.CO- 5.4/ CI-3.1, SV-39/ SVI 42. HR 70 MAP-65.  Dr Almaraz updated on results of measurements. Measurements discontinued at direction of Dr Almaraz without further fluid infusion.

## 2018-07-01 NOTE — PLAN OF CARE
Problem: Patient Care Overview  Goal: Plan of Care Review  Outcome: Ongoing (interventions implemented as appropriate)  AAOx4. Up to bathroom. Unable to give prn pain meds due to low BP. IVF, bolus, and atx infusing per order. Tele maintained. Pt slept well through the night. Scheduled sleep aid given. Q2 hour rounding utilized. Pain and comfort assessed. Bed low, brakes locked, SR up, call light within reach. POC reviewed. Pt verbalized understanding. Will continue to monitor.

## 2018-07-01 NOTE — CONSULTS
Jesi Nagy [id number] is a 57 y.o. female who has been consulted for vancomycin dosing.    The patient has the following labs:     Date Creatinine (mg/dl)    BUN WBC Count   7/1/2018 Estimated Creatinine Clearance: 85 mL/min (based on SCr of 0.7 mg/dL). Lab Results   Component Value Date    BUN 6 06/30/2018     Lab Results   Component Value Date    WBC 13.50 (H) 06/30/2018        Current weight is 61.2 kg (135 lb)    Pt is receiving vancomycin 1000 mg every 12 hours.  Vancomycin trough from 7/1 at 0441 was 8.9 mg/dL.  Target trough range is 15-20 mg/dL.   Trough was drawn one hour late and anticipate it is subtherapeutic. Pharmacy will increase current dose.   The patient will be changed to a vancomycin dose of 1500 mg every 12 hours. A vancomycin trough has been ordered prior to 4th dose due 7/2 at 1730.      Patient will be followed by pharmacy for changes in renal function, toxicity, and efficacy.  Thank you for allowing us to participate in this patient's care.     Sadia Cintron

## 2018-07-02 PROBLEM — I95.9 HYPOTENSION: Status: ACTIVE | Noted: 2018-07-02

## 2018-07-02 LAB
ALBUMIN SERPL BCP-MCNC: 1.6 G/DL
ALP SERPL-CCNC: 85 U/L
ALT SERPL W/O P-5'-P-CCNC: 38 U/L
ANION GAP SERPL CALC-SCNC: 7 MMOL/L
AST SERPL-CCNC: 33 U/L
BACTERIA SPEC AEROBE CULT: NORMAL
BASOPHILS # BLD AUTO: 0 K/UL
BASOPHILS NFR BLD: 0.1 %
BILIRUB SERPL-MCNC: 0.2 MG/DL
BUN SERPL-MCNC: 4 MG/DL
CALCIUM SERPL-MCNC: 8.1 MG/DL
CHLORIDE SERPL-SCNC: 107 MMOL/L
CO2 SERPL-SCNC: 22 MMOL/L
CREAT SERPL-MCNC: 0.6 MG/DL
DIFFERENTIAL METHOD: ABNORMAL
EOSINOPHIL # BLD AUTO: 0 K/UL
EOSINOPHIL NFR BLD: 0.1 %
ERYTHROCYTE [DISTWIDTH] IN BLOOD BY AUTOMATED COUNT: 16.2 %
EST. GFR  (AFRICAN AMERICAN): >60 ML/MIN/1.73 M^2
EST. GFR  (NON AFRICAN AMERICAN): >60 ML/MIN/1.73 M^2
GLUCOSE SERPL-MCNC: 128 MG/DL
GRAM STN SPEC: NORMAL
HCT VFR BLD AUTO: 33 %
HGB BLD-MCNC: 11 G/DL
HIV1+2 IGG SERPL QL IA.RAPID: NEGATIVE
LYMPHOCYTES # BLD AUTO: 1.4 K/UL
LYMPHOCYTES NFR BLD: 9.4 %
MAGNESIUM SERPL-MCNC: 1.9 MG/DL
MCH RBC QN AUTO: 33.3 PG
MCHC RBC AUTO-ENTMCNC: 33.3 G/DL
MCV RBC AUTO: 100 FL
MONOCYTES # BLD AUTO: 0.5 K/UL
MONOCYTES NFR BLD: 3.7 %
NEUTROPHILS # BLD AUTO: 12.5 K/UL
NEUTROPHILS NFR BLD: 86.7 %
PHOSPHATE SERPL-MCNC: 2.7 MG/DL
PLATELET # BLD AUTO: 444 K/UL
PMV BLD AUTO: 8.9 FL
POTASSIUM SERPL-SCNC: 3.7 MMOL/L
PROT SERPL-MCNC: 5.6 G/DL
RBC # BLD AUTO: 3.3 M/UL
SODIUM SERPL-SCNC: 136 MMOL/L
VANCOMYCIN TROUGH SERPL-MCNC: 18.7 UG/ML
WBC # BLD AUTO: 14.4 K/UL

## 2018-07-02 PROCEDURE — 87206 SMEAR FLUORESCENT/ACID STAI: CPT

## 2018-07-02 PROCEDURE — 99232 SBSQ HOSP IP/OBS MODERATE 35: CPT | Mod: ,,, | Performed by: INTERNAL MEDICINE

## 2018-07-02 PROCEDURE — 80202 ASSAY OF VANCOMYCIN: CPT

## 2018-07-02 PROCEDURE — 87116 MYCOBACTERIA CULTURE: CPT

## 2018-07-02 PROCEDURE — 83735 ASSAY OF MAGNESIUM: CPT

## 2018-07-02 PROCEDURE — 25000003 PHARM REV CODE 250: Performed by: HOSPITALIST

## 2018-07-02 PROCEDURE — 87015 SPECIMEN INFECT AGNT CONCNTJ: CPT

## 2018-07-02 PROCEDURE — 36415 COLL VENOUS BLD VENIPUNCTURE: CPT

## 2018-07-02 PROCEDURE — 11000001 HC ACUTE MED/SURG PRIVATE ROOM

## 2018-07-02 PROCEDURE — 25000003 PHARM REV CODE 250: Performed by: INTERNAL MEDICINE

## 2018-07-02 PROCEDURE — 80053 COMPREHEN METABOLIC PANEL: CPT

## 2018-07-02 PROCEDURE — 86480 TB TEST CELL IMMUN MEASURE: CPT

## 2018-07-02 PROCEDURE — 94761 N-INVAS EAR/PLS OXIMETRY MLT: CPT

## 2018-07-02 PROCEDURE — 63600175 PHARM REV CODE 636 W HCPCS: Performed by: INTERNAL MEDICINE

## 2018-07-02 PROCEDURE — 85025 COMPLETE CBC W/AUTO DIFF WBC: CPT

## 2018-07-02 PROCEDURE — 84100 ASSAY OF PHOSPHORUS: CPT

## 2018-07-02 RX ORDER — PREDNISONE 5 MG/1
10 TABLET ORAL 3 TIMES DAILY
Status: DISCONTINUED | OUTPATIENT
Start: 2018-07-02 | End: 2018-07-06 | Stop reason: HOSPADM

## 2018-07-02 RX ORDER — HYDROCODONE POLISTIREX AND CHLORPHENIRAMINE POLISTIREX 10; 8 MG/5ML; MG/5ML
5 SUSPENSION, EXTENDED RELEASE ORAL EVERY 12 HOURS
Status: DISCONTINUED | OUTPATIENT
Start: 2018-07-02 | End: 2018-07-06 | Stop reason: HOSPADM

## 2018-07-02 RX ORDER — HYDROCODONE BITARTRATE AND ACETAMINOPHEN 5; 325 MG/1; MG/1
1 TABLET ORAL EVERY 6 HOURS
Status: DISCONTINUED | OUTPATIENT
Start: 2018-07-02 | End: 2018-07-06 | Stop reason: HOSPADM

## 2018-07-02 RX ADMIN — ACETAMINOPHEN 1000 MG: 500 TABLET, FILM COATED ORAL at 03:07

## 2018-07-02 RX ADMIN — PREDNISONE 10 MG: 5 TABLET ORAL at 08:07

## 2018-07-02 RX ADMIN — VANCOMYCIN HYDROCHLORIDE 1500 MG: 1 INJECTION, POWDER, LYOPHILIZED, FOR SOLUTION INTRAVENOUS at 07:07

## 2018-07-02 RX ADMIN — PIPERACILLIN SODIUM AND TAZOBACTAM SODIUM 4.5 G: 4; .5 INJECTION, POWDER, LYOPHILIZED, FOR SOLUTION INTRAVENOUS at 11:07

## 2018-07-02 RX ADMIN — HYDROCODONE POLISTIREX AND CHLORPHENIRAMINE POLISITREX 5 ML: 10; 8 SUSPENSION, EXTENDED RELEASE ORAL at 07:07

## 2018-07-02 RX ADMIN — PIPERACILLIN SODIUM AND TAZOBACTAM SODIUM 4.5 G: 4; .5 INJECTION, POWDER, LYOPHILIZED, FOR SOLUTION INTRAVENOUS at 05:07

## 2018-07-02 RX ADMIN — IBUPROFEN 600 MG: 600 TABLET ORAL at 08:07

## 2018-07-02 RX ADMIN — IBUPROFEN 600 MG: 600 TABLET ORAL at 01:07

## 2018-07-02 RX ADMIN — PREDNISONE 5 MG: 5 TABLET ORAL at 08:07

## 2018-07-02 RX ADMIN — MIDODRINE HYDROCHLORIDE 5 MG: 2.5 TABLET ORAL at 08:07

## 2018-07-02 RX ADMIN — ACETAMINOPHEN 1000 MG: 500 TABLET, FILM COATED ORAL at 08:07

## 2018-07-02 RX ADMIN — MIDODRINE HYDROCHLORIDE 5 MG: 2.5 TABLET ORAL at 05:07

## 2018-07-02 RX ADMIN — VANCOMYCIN HYDROCHLORIDE 1500 MG: 1 INJECTION, POWDER, LYOPHILIZED, FOR SOLUTION INTRAVENOUS at 05:07

## 2018-07-02 RX ADMIN — IBUPROFEN 600 MG: 600 TABLET ORAL at 05:07

## 2018-07-02 RX ADMIN — HYDROCODONE BITARTRATE AND ACETAMINOPHEN 1 TABLET: 5; 325 TABLET ORAL at 05:07

## 2018-07-02 RX ADMIN — RAMELTEON 8 MG: 8 TABLET, FILM COATED ORAL at 08:07

## 2018-07-02 RX ADMIN — HYDROCODONE POLISTIREX AND CHLORPHENIRAMINE POLISITREX 5 ML: 10; 8 SUSPENSION, EXTENDED RELEASE ORAL at 08:07

## 2018-07-02 RX ADMIN — HYDROCODONE BITARTRATE AND ACETAMINOPHEN 1 TABLET: 5; 325 TABLET ORAL at 04:07

## 2018-07-02 RX ADMIN — HYDROCODONE BITARTRATE AND ACETAMINOPHEN 1 TABLET: 5; 325 TABLET ORAL at 11:07

## 2018-07-02 RX ADMIN — PIPERACILLIN SODIUM AND TAZOBACTAM SODIUM 4.5 G: 4; .5 INJECTION, POWDER, LYOPHILIZED, FOR SOLUTION INTRAVENOUS at 04:07

## 2018-07-02 NOTE — PROGRESS NOTES
Progress Note  PULMONARY    Admit Date: 6/29/2018 07/02/2018      SUBJECTIVE:     June 30, still poor appetite, ambulated in room, cough less and sl better  Ju;y 1, still poor appetite, ambulates room, violent cough with dark mucous.  July 2, miserable still violent cough with nasty foul mocous 1/4 tsp , old blood present. Irate somewhat. Appetite poor attributes to nasty taste        PFSH and Allergies reviewed.    OBJECTIVE:     Vitals (Most recent):  Vitals:    07/02/18 1150   BP: (!) 95/58   Pulse: 69   Resp: 18   Temp: 97 °F (36.1 °C)       Vitals (24 hour range):  Temp:  [97 °F (36.1 °C)-98.5 °F (36.9 °C)]   Pulse:  [66-80]   Resp:  [14-18]   BP: ()/(51-67)   SpO2:  [95 %-98 %]       Intake/Output Summary (Last 24 hours) at 07/02/18 1254  Last data filed at 07/01/18 1800   Gross per 24 hour   Intake             1700 ml   Output                0 ml   Net             1700 ml          Physical Exam:  The patient's neuro status (alertness,orientation,cognitive function,motor skills,), pharyngeal exam (oral lesions, hygiene, abn dentition,), Neck (jvd,mass,thyroid,nodes in neck and above/below clavicle),RESPIRATORY(symmetry,effort,fremitus,percussion,auscultation),  Cor(rhythm,heart tones including gallops,perfusion,edema)ABD(distention,hepatic&splenomegaly,tenderness,masses), Skin(rash,cyanosis),Psyc(affect,judgement,).  Exam negative except for these pertinent findings:    Alert,no distress , chest is symmetric, no distress, normal percussion, normal fremitus and good normal breath sounds  No edeam     Radiographs reviewed: view by direct vision - July 1, sl better cxr       Labs       Recent Labs  Lab 07/02/18  0658   WBC 14.40*   HGB 11.0*   HCT 33.0*   *       Recent Labs  Lab 07/02/18  0658      K 3.7      CO2 22*   BUN 4*   CREATININE 0.6   *   CALCIUM 8.1*   MG 1.9   PHOS 2.7   AST 33   ALT 38   ALKPHOS 85   BILITOT 0.2   PROT 5.6*   ALBUMIN 1.6*   No results for input(s):  PH, PCO2, PO2, HCO3 in the last 24 hours.  Microbiology Results (last 7 days)     Procedure Component Value Units Date/Time    Culture, Respiratory with Gram Stain [[id number]] Collected:  06/30/18 0700    Order Status:  Completed Specimen:  Respiratory from Sputum, Expectorated Updated:  07/02/18 1252     Respiratory Culture Normal respiratory alka     Gram Stain (Respiratory) <10 epithelial cells per low power field.     Gram Stain (Respiratory) No WBC's     Gram Stain (Respiratory) Many Gram positive cocci     Gram Stain (Respiratory) Many Gram negative rods    AFB Culture & Smear [[id number]] Collected:  07/02/18 0451    Order Status:  Sent Specimen:  Respiratory from Sputum, Expectorated Updated:  07/02/18 1008    Blood culture (site 1) [[id number]] Collected:  06/29/18 1325    Order Status:  Completed Specimen:  Blood Updated:  07/01/18 2212     Blood Culture, Routine No Growth to date     Blood Culture, Routine No Growth to date     Blood Culture, Routine No Growth to date    Narrative:       Site # 1, aerobic and anaerobic    Blood culture (site 2) [[id number]] Collected:  06/29/18 1328    Order Status:  Completed Specimen:  Blood from Antecubital, Left  Arm Updated:  07/01/18 2212     Blood Culture, Routine No Growth to date     Blood Culture, Routine No Growth to date     Blood Culture, Routine No Growth to date    Narrative:       Site # 2, aerobic only    AFB Culture & Smear [[id number]] Collected:  06/30/18 0700    Order Status:  Completed Specimen:  Respiratory from Sputum, Expectorated Updated:  07/01/18 2127     AFB Culture & Smear Culture in progress     AFB CULTURE STAIN No acid fast bacilli seen.    AFB Culture & Smear [[id number]] Collected:  06/30/18 0700    Order Status:  Canceled Specimen:  Respiratory from Sputum, Expectorated Updated:  06/30/18 0730          Impression:  Active Hospital Problems    Diagnosis  POA    *Necrotizing pneumonia [J85.0]  Yes    Hypokalemia [E87.6]  No    Hemoptysis  [R04.2]  Yes    Abscess of right lung with pneumonia [J85.1]  Yes    Caries [K02.9]  Yes      Resolved Hospital Problems    Diagnosis Date Resolved POA   No resolved problems to display.               Plan:     June 30, gpc and gnr in sputum, discussed with Dr villeda and harriet.  Good chance will clear with abx alone. No rush to surgery.    July 1, reviewed cxr - lung abscess are in both rul and rll, no large cavities but lots of sm cavities- suspect will be slow to control with abx.  Pt having problems with violent coughing and mobilizes some purulent mucous - abscesses are not draining well.  bp low likely lingering sepsis.  MAP was 60 with smp 75 this am.  Discussed with Dr Almaraz.  Consider Chetta monitoring and more fluid boluses, will oreder low dose steroids and midodrine.  F/u cxr.  May need icu.  Hoping medical rx will work.  Sputum pending.    July 2, no staph from mucous- drop vanc soon- increase prednisone 10 tid,  norco schedule may help.   May need surg if no improvement soon?  Continue rx.                              .

## 2018-07-02 NOTE — PLAN OF CARE
Problem: Patient Care Overview  Goal: Plan of Care Review  Outcome: Ongoing (interventions implemented as appropriate)  Pt AAO x4, PIV in place, IVF and abx administered during shift.  Tele maintained and monitored.  PRN med given for c/o of pain, per parameters met, adequate relief achieved. I/O monitored and documented.  VS stable.  Hourly rounding completed.  Pt has remained free of injuries and falls throughout shift.  Environment free of clutter, side rails up x2, and call light within reach.  Pt has verbalized understanding of plan of care.

## 2018-07-02 NOTE — PLAN OF CARE
Problem: Patient Care Overview  Goal: Plan of Care Review  Outcome: Ongoing (interventions implemented as appropriate)  Received report from primary nurse, Moira. Assumed care of patient. Patient aao x 4. Plan of care reviewed with patient, verbalized understanding. IV antibiotics given. Blood pressure monitored. MAP greater than 60. Remained free from fall and injury. Bed in lowest position and call light within reach.

## 2018-07-02 NOTE — ASSESSMENT & PLAN NOTE
Patient was severe necrotizing pneumonia noted right upper lobe with possible right middle lobe involvement.  Will likely need surgery.  Discussed with thoracic surgery will take the patient on Thursday for a right upper lobectomy and will monitor patient in the meantime.  Defer to pulmonology and CT surgery for further evaluation and management.  Continue patient IV IV antibiotics, steroids and monitor closely for signs of worsening inflammation.  QuantiFERON gold drawn today, however TB risk remains extremely low.

## 2018-07-02 NOTE — PLAN OF CARE
7/2/2018    Patient: Jesi Nagy    YOB: 1960    To whom it may concern,    Jesi Nagy was admitted to the hospital on 6/29/2018. Patient is under my care at the hospital and is unable to work until she is discharged and some time thereafter. If you have any questions or concerns, please don't hesitate to contact the department of hospital medicine at [facility] at [phone].     Sincerely,    Melvin Garcia MD    Department of Hospital Medicine

## 2018-07-02 NOTE — ASSESSMENT & PLAN NOTE
Patient has hypokalemia which is currently controlled. Last electrolytes reviewed-     Recent Labs  Lab 07/01/18  0440 07/02/18  0658   K 3.4* 3.7   . Will replace potassium and monitor electrolytes closely.

## 2018-07-02 NOTE — ASSESSMENT & PLAN NOTE
etiology remains unclear, potentially related to medications and/or sepsis.  Lactic acid negative and patient is showed no other signs of systemic inflammatory response.  Will continue to monitor closely and continue midodrine and fluid boluses as needed.  If she does show signs of worsening systemic inflammation, would recommend transfer to ICU and initiation of pressors to maintain mean arterial pressure greater than 65.

## 2018-07-02 NOTE — PLAN OF CARE
Problem: Patient Care Overview  Goal: Plan of Care Review  AAOx4,  B/P monitoring continued, Cardiac monitoring continued. 22g R forearm IVF infusing per MD order. Pt complains of chest pain related to her frequent coughing. Pt ambulates independently. Regular diet. Denies SOB, PRN cough medication available. POC reviewed with pt, verbalized understanding. Pt locked and low, call light within reach, will cont to monitor.

## 2018-07-02 NOTE — PROGRESS NOTES
[facility] Hospital Medicine  Progress Note    Patient Name: Jesi Nagy  MRN: [id number]  Patient Class: IP- Inpatient   Admission Date: 6/29/2018  Length of Stay: 3 days  Attending Physician: Melvin Garcia MD  Primary Care Provider: Primary Doctor No        Subjective:     Principal Problem:Necrotizing pneumonia    HPI:  Patient is a 57-year-old  female with a past medical history significant for anxiety who presents to the hospital after having being referred from the clinic.  Patient had upper respiratory symptoms for approximately 6 months prior to admission.  Showed intermittent periods of cough, bloody sputum, and fever up to 102.  Denies any night sweats or weight loss.  Each time, the patient had presented to urgent care and started on antibiotics for short course in which she transiently improved but then relapsed again approximately 1-2 weeks later.  Patient has been through per her own description at least 5 courses of oral antibiotics and has continued to have intermittent spiking fevers in between regimens of antibiotics.  She sought care in the [facility] and was initially found to have an abnormality on chest x-ray and markedly elevated inflammatory markers which prompted further evaluation by CT scan of the chest.  On CT scan, the patient was found to have a right upper lobe necrotizing pneumonia.  Patient states she continues to have sharp chest pain which is exacerbated with activity and deep breathing.  She does also admit to intermittent fevers the last of which was 2 days ago.  Patient denies any nausea, vomiting, diarrhea, or other abdominal complaints.    Hospital Course:  No notes on file    Interval History: The the patient seen and examined.  No acute events overnight.  Patient remains mildly hypotensive status post bolus yesterday at approximately 1:00 p.m. with response.  Lactic acids remain negative.  Patient alert orient x3 on exam this  morning.  Plan of care discussed with the patient and  as well as the bedside nurse in detail.    Review of Systems   Constitutional: Positive for activity change, appetite change, chills, fatigue and fever.   Respiratory: Positive for cough and shortness of breath.    Cardiovascular: Positive for chest pain. Negative for leg swelling.   Gastrointestinal: Negative for abdominal pain and nausea.   Neurological: Positive for weakness.   Psychiatric/Behavioral: Negative for confusion.   All other systems reviewed and are negative.    Objective:     Vital Signs (Most Recent):  Temp:  (Told nurse Jalyn about B/p) (07/02/18 1319)  Pulse: 69 (07/02/18 1150)  Resp: 18 (07/02/18 1150)  BP: (!) 95/58 (07/02/18 1150)  SpO2: 97 % (07/02/18 1150) Vital Signs (24h Range):  Temp:  [97 °F (36.1 °C)-98.5 °F (36.9 °C)] 97 °F (36.1 °C)  Pulse:  [66-80] 69  Resp:  [14-18] 18  SpO2:  [95 %-98 %] 97 %  BP: ()/(51-67) 95/58     Weight: 63.9 kg (140 lb 14 oz)  Body mass index is 22.74 kg/m².    Intake/Output Summary (Last 24 hours) at 07/02/18 1442  Last data filed at 07/01/18 1800   Gross per 24 hour   Intake             1700 ml   Output                0 ml   Net             1700 ml      Physical Exam   Constitutional: She is oriented to person, place, and time. She appears well-developed and well-nourished.   HENT:   Head: Normocephalic and atraumatic.   Eyes: Right eye exhibits no discharge. Left eye exhibits no discharge. No scleral icterus.   Cardiovascular: Normal rate and regular rhythm.  Exam reveals no gallop and no friction rub.    No murmur heard.  Pulmonary/Chest: Effort normal. She has no wheezes. She has no rales.   Decreased breath sounds noted at the right upper lung field.  No increased work of breathing is noted. Chest wall tenderness noted on palpation particularly of the right side.   Abdominal: Soft. Bowel sounds are normal. She exhibits no distension. There is no tenderness.   Musculoskeletal: She exhibits  no edema or tenderness.   Neurological: She is alert and oriented to person, place, and time. She has normal reflexes. No cranial nerve deficit.   Skin: No rash noted. No erythema.   Psychiatric: She has a normal mood and affect.   Nursing note and vitals reviewed.      Significant Labs: All pertinent labs within the past 24 hours have been reviewed.    Significant Imaging: I have reviewed all pertinent imaging results/findings within the past 24 hours.    Assessment/Plan:      * Necrotizing pneumonia    Patient was severe necrotizing pneumonia noted right upper lobe with possible right middle lobe involvement.  Will likely need surgery.  Discussed with thoracic surgery will take the patient on Thursday for a right upper lobectomy and will monitor patient in the meantime.  Defer to pulmonology and CT surgery for further evaluation and management.  Continue patient IV IV antibiotics, steroids and monitor closely for signs of worsening inflammation.  QuantiFERON gold drawn today, however TB risk remains extremely low.          Abscess of right lung with pneumonia    Treatment per above          Hemoptysis    Etiology likely related to severe pneumonia.  Treatment per above.  Malignancy unlikely, however not completely ruled out at this point.  No reported hemoptysis this admission, but did have prior to admission          Hypotension    etiology remains unclear, potentially related to medications and/or sepsis.  Lactic acid negative and patient is showed no other signs of systemic inflammatory response.  Will continue to monitor closely and continue midodrine and fluid boluses as needed.  If she does show signs of worsening systemic inflammation, would recommend transfer to ICU and initiation of pressors to maintain mean arterial pressure greater than 65.          Hypokalemia    Patient has hypokalemia which is currently controlled. Last electrolytes reviewed-     Recent Labs  Lab 07/01/18  0440 07/02/18  0658   K  3.4* 3.7   . Will replace potassium and monitor electrolytes closely.             Caries    Possibly contributing to infection, will f/u blood cultures            VTE Risk Mitigation         Ordered     IP VTE LOW RISK PATIENT  Once      06/29/18 1243              Melvin Garcia MD  Department of Hospital Medicine   [facility]

## 2018-07-02 NOTE — SUBJECTIVE & OBJECTIVE
Interval History: The the patient seen and examined.  No acute events overnight.  Patient remains mildly hypotensive status post bolus yesterday at approximately 1:00 p.m. with response.  Lactic acids remain negative.  Patient alert orient x3 on exam this morning.  Plan of care discussed with the patient and  as well as the bedside nurse in detail.    Review of Systems   Constitutional: Positive for activity change, appetite change, chills, fatigue and fever.   Respiratory: Positive for cough and shortness of breath.    Cardiovascular: Positive for chest pain. Negative for leg swelling.   Gastrointestinal: Negative for abdominal pain and nausea.   Neurological: Positive for weakness.   Psychiatric/Behavioral: Negative for confusion.   All other systems reviewed and are negative.    Objective:     Vital Signs (Most Recent):  Temp:  (Told nurse Jalyn about B/p) (07/02/18 1319)  Pulse: 69 (07/02/18 1150)  Resp: 18 (07/02/18 1150)  BP: (!) 95/58 (07/02/18 1150)  SpO2: 97 % (07/02/18 1150) Vital Signs (24h Range):  Temp:  [97 °F (36.1 °C)-98.5 °F (36.9 °C)] 97 °F (36.1 °C)  Pulse:  [66-80] 69  Resp:  [14-18] 18  SpO2:  [95 %-98 %] 97 %  BP: ()/(51-67) 95/58     Weight: 63.9 kg (140 lb 14 oz)  Body mass index is 22.74 kg/m².    Intake/Output Summary (Last 24 hours) at 07/02/18 1442  Last data filed at 07/01/18 1800   Gross per 24 hour   Intake             1700 ml   Output                0 ml   Net             1700 ml      Physical Exam   Constitutional: She is oriented to person, place, and time. She appears well-developed and well-nourished.   HENT:   Head: Normocephalic and atraumatic.   Eyes: Right eye exhibits no discharge. Left eye exhibits no discharge. No scleral icterus.   Cardiovascular: Normal rate and regular rhythm.  Exam reveals no gallop and no friction rub.    No murmur heard.  Pulmonary/Chest: Effort normal. She has no wheezes. She has no rales.   Decreased breath sounds noted at the right upper  lung field.  No increased work of breathing is noted. Chest wall tenderness noted on palpation particularly of the right side.   Abdominal: Soft. Bowel sounds are normal. She exhibits no distension. There is no tenderness.   Musculoskeletal: She exhibits no edema or tenderness.   Neurological: She is alert and oriented to person, place, and time. She has normal reflexes. No cranial nerve deficit.   Skin: No rash noted. No erythema.   Psychiatric: She has a normal mood and affect.   Nursing note and vitals reviewed.      Significant Labs: All pertinent labs within the past 24 hours have been reviewed.    Significant Imaging: I have reviewed all pertinent imaging results/findings within the past 24 hours.

## 2018-07-03 LAB
ALBUMIN SERPL BCP-MCNC: 1.6 G/DL
ALP SERPL-CCNC: 78 U/L
ALT SERPL W/O P-5'-P-CCNC: 38 U/L
ANION GAP SERPL CALC-SCNC: 7 MMOL/L
AST SERPL-CCNC: 38 U/L
BASOPHILS # BLD AUTO: 0 K/UL
BASOPHILS NFR BLD: 0.4 %
BILIRUB SERPL-MCNC: 0.1 MG/DL
BUN SERPL-MCNC: 6 MG/DL
CALCIUM SERPL-MCNC: 8.5 MG/DL
CHLORIDE SERPL-SCNC: 109 MMOL/L
CO2 SERPL-SCNC: 21 MMOL/L
CREAT SERPL-MCNC: 0.6 MG/DL
DIFFERENTIAL METHOD: ABNORMAL
EOSINOPHIL # BLD AUTO: 0 K/UL
EOSINOPHIL NFR BLD: 0.2 %
ERYTHROCYTE [DISTWIDTH] IN BLOOD BY AUTOMATED COUNT: 16.1 %
EST. GFR  (AFRICAN AMERICAN): >60 ML/MIN/1.73 M^2
EST. GFR  (NON AFRICAN AMERICAN): >60 ML/MIN/1.73 M^2
GLUCOSE SERPL-MCNC: 124 MG/DL
HCT VFR BLD AUTO: 35.6 %
HGB BLD-MCNC: 12 G/DL
LYMPHOCYTES # BLD AUTO: 1.3 K/UL
LYMPHOCYTES NFR BLD: 12.1 %
MAGNESIUM SERPL-MCNC: 2 MG/DL
MCH RBC QN AUTO: 33.5 PG
MCHC RBC AUTO-ENTMCNC: 33.7 G/DL
MCV RBC AUTO: 100 FL
MITOGEN IGNF BCKGRD COR BLD-ACNC: 0.09 IU/ML
MITOGEN NIL: 0.85 IU/ML
MONOCYTES # BLD AUTO: 0.3 K/UL
MONOCYTES NFR BLD: 3.1 %
NEUTROPHILS # BLD AUTO: 9 K/UL
NEUTROPHILS NFR BLD: 84.2 %
PHOSPHATE SERPL-MCNC: 4 MG/DL
PLATELET # BLD AUTO: 482 K/UL
PMV BLD AUTO: 9 FL
POTASSIUM SERPL-SCNC: 4.1 MMOL/L
PROT SERPL-MCNC: 5.6 G/DL
RBC # BLD AUTO: 3.57 M/UL
SODIUM SERPL-SCNC: 137 MMOL/L
TB GOLD PLUS: NEGATIVE
TB1 - NIL: 0.02 IU/ML
TB2 - NIL: 0.01 IU/ML
VANCOMYCIN TROUGH SERPL-MCNC: 22 UG/ML
WBC # BLD AUTO: 10.7 K/UL

## 2018-07-03 PROCEDURE — 99232 SBSQ HOSP IP/OBS MODERATE 35: CPT | Mod: ,,, | Performed by: INTERNAL MEDICINE

## 2018-07-03 PROCEDURE — 25000003 PHARM REV CODE 250: Performed by: INTERNAL MEDICINE

## 2018-07-03 PROCEDURE — C1751 CATH, INF, PER/CENT/MIDLINE: HCPCS

## 2018-07-03 PROCEDURE — 36415 COLL VENOUS BLD VENIPUNCTURE: CPT

## 2018-07-03 PROCEDURE — 76937 US GUIDE VASCULAR ACCESS: CPT

## 2018-07-03 PROCEDURE — 02HV33Z INSERTION OF INFUSION DEVICE INTO SUPERIOR VENA CAVA, PERCUTANEOUS APPROACH: ICD-10-PCS | Performed by: HOSPITALIST

## 2018-07-03 PROCEDURE — 85025 COMPLETE CBC W/AUTO DIFF WBC: CPT

## 2018-07-03 PROCEDURE — 25000003 PHARM REV CODE 250: Performed by: HOSPITALIST

## 2018-07-03 PROCEDURE — 63600175 PHARM REV CODE 636 W HCPCS: Performed by: INTERNAL MEDICINE

## 2018-07-03 PROCEDURE — 36569 INSJ PICC 5 YR+ W/O IMAGING: CPT

## 2018-07-03 PROCEDURE — 83735 ASSAY OF MAGNESIUM: CPT

## 2018-07-03 PROCEDURE — 80202 ASSAY OF VANCOMYCIN: CPT

## 2018-07-03 PROCEDURE — 25000003 PHARM REV CODE 250: Performed by: NURSE PRACTITIONER

## 2018-07-03 PROCEDURE — 84100 ASSAY OF PHOSPHORUS: CPT

## 2018-07-03 PROCEDURE — A4216 STERILE WATER/SALINE, 10 ML: HCPCS | Performed by: HOSPITALIST

## 2018-07-03 PROCEDURE — 80053 COMPREHEN METABOLIC PANEL: CPT

## 2018-07-03 PROCEDURE — 11000001 HC ACUTE MED/SURG PRIVATE ROOM

## 2018-07-03 RX ORDER — SODIUM CHLORIDE 0.9 % (FLUSH) 0.9 %
10 SYRINGE (ML) INJECTION
Status: DISCONTINUED | OUTPATIENT
Start: 2018-07-03 | End: 2018-07-06 | Stop reason: HOSPADM

## 2018-07-03 RX ORDER — SODIUM CHLORIDE 0.9 % (FLUSH) 0.9 %
10 SYRINGE (ML) INJECTION EVERY 6 HOURS
Status: DISCONTINUED | OUTPATIENT
Start: 2018-07-03 | End: 2018-07-06 | Stop reason: HOSPADM

## 2018-07-03 RX ORDER — DIPHENHYDRAMINE HCL 25 MG
25 CAPSULE ORAL EVERY 6 HOURS PRN
Status: DISCONTINUED | OUTPATIENT
Start: 2018-07-03 | End: 2018-07-06 | Stop reason: HOSPADM

## 2018-07-03 RX ADMIN — PIPERACILLIN SODIUM AND TAZOBACTAM SODIUM 4.5 G: 4; .5 INJECTION, POWDER, LYOPHILIZED, FOR SOLUTION INTRAVENOUS at 12:07

## 2018-07-03 RX ADMIN — PREDNISONE 10 MG: 5 TABLET ORAL at 08:07

## 2018-07-03 RX ADMIN — IBUPROFEN 600 MG: 600 TABLET ORAL at 12:07

## 2018-07-03 RX ADMIN — DIPHENHYDRAMINE HYDROCHLORIDE 25 MG: 25 CAPSULE ORAL at 10:07

## 2018-07-03 RX ADMIN — PIPERACILLIN SODIUM AND TAZOBACTAM SODIUM 4.5 G: 4; .5 INJECTION, POWDER, LYOPHILIZED, FOR SOLUTION INTRAVENOUS at 04:07

## 2018-07-03 RX ADMIN — HYDROCODONE BITARTRATE AND ACETAMINOPHEN 1 TABLET: 5; 325 TABLET ORAL at 06:07

## 2018-07-03 RX ADMIN — PREDNISONE 10 MG: 5 TABLET ORAL at 02:07

## 2018-07-03 RX ADMIN — PIPERACILLIN SODIUM AND TAZOBACTAM SODIUM 4.5 G: 4; .5 INJECTION, POWDER, LYOPHILIZED, FOR SOLUTION INTRAVENOUS at 10:07

## 2018-07-03 RX ADMIN — MIDODRINE HYDROCHLORIDE 5 MG: 2.5 TABLET ORAL at 08:07

## 2018-07-03 RX ADMIN — HYDROCODONE POLISTIREX AND CHLORPHENIRAMINE POLISITREX 5 ML: 10; 8 SUSPENSION, EXTENDED RELEASE ORAL at 08:07

## 2018-07-03 RX ADMIN — IBUPROFEN 600 MG: 600 TABLET ORAL at 08:07

## 2018-07-03 RX ADMIN — PIPERACILLIN SODIUM AND TAZOBACTAM SODIUM 4.5 G: 4; .5 INJECTION, POWDER, LYOPHILIZED, FOR SOLUTION INTRAVENOUS at 05:07

## 2018-07-03 RX ADMIN — VANCOMYCIN HYDROCHLORIDE 1500 MG: 1 INJECTION, POWDER, LYOPHILIZED, FOR SOLUTION INTRAVENOUS at 06:07

## 2018-07-03 RX ADMIN — HYDROCODONE BITARTRATE AND ACETAMINOPHEN 1 TABLET: 5; 325 TABLET ORAL at 12:07

## 2018-07-03 RX ADMIN — SODIUM CHLORIDE, PRESERVATIVE FREE 10 ML: 5 INJECTION INTRAVENOUS at 06:07

## 2018-07-03 RX ADMIN — IBUPROFEN 600 MG: 600 TABLET ORAL at 04:07

## 2018-07-03 RX ADMIN — HYDROCODONE POLISTIREX AND CHLORPHENIRAMINE POLISITREX 5 ML: 10; 8 SUSPENSION, EXTENDED RELEASE ORAL at 10:07

## 2018-07-03 RX ADMIN — RAMELTEON 8 MG: 8 TABLET, FILM COATED ORAL at 08:07

## 2018-07-03 NOTE — PROCEDURES
"Jesi Nagy is a 57 y.o. female patient.    Temp: 97 °F (36.1 °C) (07/03/18 1536)  Pulse: 67 (07/03/18 1536)  Resp: 18 (07/03/18 1536)  BP: (!) 154/79 (07/03/18 1536)  SpO2: 97 % (07/03/18 1536)  Weight: 63.9 kg (140 lb 14 oz) (07/01/18 0600)  Height: 5' 6" (167.6 cm) (07/01/18 0600)    PICC  Date/Time: 7/3/2018 4:26 PM  Performed by: MILENA HURT  Consent Done: Yes  Time out: Immediately prior to procedure a time out was called to verify the correct patient, procedure, equipment, support staff and site/side marked as required  Indications: med administration  Anesthesia: local infiltration  Local anesthetic: lidocaine 1% without epinephrine  Anesthetic Total (mL): 2  Preparation: skin prepped with ChloraPrep  Skin prep agent dried: skin prep agent completely dried prior to procedure  Sterile barriers: all five maximum sterile barriers used - cap, mask, sterile gown, sterile gloves, and large sterile sheet  Hand hygiene: hand hygiene performed prior to central venous catheter insertion  Location details: right basilic  Catheter type: double lumen  Catheter size: 5 Fr  Catheter Length: 35cm    Ultrasound guidance: yes  Vessel Caliber: medium and patent, compressibility normal  Needle advanced into vessel with real time Ultrasound guidance.  Guidewire confirmed in vessel.  Image recorded and saved.  Sterile sheath used.  Number of attempts: 1  Post-procedure: blood return through all ports, chlorhexidine patch and sterile dressing applied    Assessment: placement verified by x-ray, no pneumothorax on x-ray, successful placement and tip termination  Tip Termination Explanation: svc  Complications: none          Milena Hurt  7/3/2018  "

## 2018-07-03 NOTE — SUBJECTIVE & OBJECTIVE
Interval History: Patient seen and examined.  Continues to have difficulty maintaining IV access overnight.  Multiple attempts and multiple IVs started.  Patient also continues to complain of chest wall pain and cough.  Intermittently hypotensive.  Plan of care discussed with the patient and the nurse at the bedside in detail.  Also discussed with cardiothoracic surgery.    Review of Systems   Constitutional: Positive for activity change, appetite change and fatigue.   Respiratory: Positive for cough and shortness of breath.    Cardiovascular: Negative for chest pain and leg swelling.   Gastrointestinal: Negative for abdominal pain and nausea.   Neurological: Negative for weakness.   Psychiatric/Behavioral: Negative for confusion.   All other systems reviewed and are negative.    Objective:     Vital Signs (Most Recent):  Temp: 97.7 °F (36.5 °C) (07/03/18 1151)  Pulse: 64 (07/03/18 1151)  Resp: 18 (07/03/18 1151)  BP: (!) 152/92 (07/03/18 1151)  SpO2: (!) 93 % (07/03/18 1151) Vital Signs (24h Range):  Temp:  [97.6 °F (36.4 °C)-97.9 °F (36.6 °C)] 97.7 °F (36.5 °C)  Pulse:  [62-77] 64  Resp:  [16-18] 18  SpO2:  [93 %-98 %] 93 %  BP: ()/(52-92) 152/92     Weight: 63.9 kg (140 lb 14 oz)  Body mass index is 22.74 kg/m².    Intake/Output Summary (Last 24 hours) at 07/03/18 1416  Last data filed at 07/03/18 0512   Gross per 24 hour   Intake             1170 ml   Output                0 ml   Net             1170 ml      Physical Exam   Constitutional: She is oriented to person, place, and time. She appears well-developed and well-nourished.   HENT:   Head: Normocephalic and atraumatic.   Eyes: Right eye exhibits no discharge. Left eye exhibits no discharge. No scleral icterus.   Cardiovascular: Normal rate and regular rhythm.  Exam reveals no gallop and no friction rub.    No murmur heard.  Pulmonary/Chest: Effort normal. She has no wheezes. She has no rales.   Decreased breath sounds noted at the right upper lung  field.  No increased work of breathing is noted. Chest wall tenderness noted on palpation particularly of the right side.   Abdominal: Soft. Bowel sounds are normal. She exhibits no distension. There is no tenderness.   Musculoskeletal: She exhibits no edema or tenderness.   Neurological: She is alert and oriented to person, place, and time. She has normal reflexes. No cranial nerve deficit.   Skin: No rash noted. No erythema.   Psychiatric: She has a normal mood and affect.   Nursing note and vitals reviewed.      Significant Labs: All pertinent labs within the past 24 hours have been reviewed.    Significant Imaging: I have reviewed all pertinent imaging results/findings within the past 24 hours.

## 2018-07-03 NOTE — ASSESSMENT & PLAN NOTE
Patient was severe necrotizing pneumonia noted right upper lobe with possible right middle lobe involvement.  Will likely need surgery if no improvement with IV ABX.  Discussed with thoracic surgery will take the patient on Thursday for a right upper lobectomy and will monitor patient in the meantime.  Defer to pulmonology and CT surgery for further evaluation and management.  Continue patient IV antibiotics, steroids and monitor closely for signs of worsening inflammation.  QuantiFERON gold drawn, however TB risk remains extremely low.

## 2018-07-03 NOTE — PROGRESS NOTES
[facility] Hospital Medicine  Progress Note    Patient Name: Jesi Nagy  MRN: [id number]  Patient Class: IP- Inpatient   Admission Date: 6/29/2018  Length of Stay: 4 days  Attending Physician: Melvin Garcia MD  Primary Care Provider: Primary Doctor No        Subjective:     Principal Problem:Necrotizing pneumonia    HPI:  Patient is a 57-year-old  female with a past medical history significant for anxiety who presents to the hospital after having being referred from the clinic.  Patient had upper respiratory symptoms for approximately 6 months prior to admission.  Showed intermittent periods of cough, bloody sputum, and fever up to 102.  Denies any night sweats or weight loss.  Each time, the patient had presented to urgent care and started on antibiotics for short course in which she transiently improved but then relapsed again approximately 1-2 weeks later.  Patient has been through per her own description at least 5 courses of oral antibiotics and has continued to have intermittent spiking fevers in between regimens of antibiotics.  She sought care in the Primary Care Clinic and was initially found to have an abnormality on chest x-ray and markedly elevated inflammatory markers which prompted further evaluation by CT scan of the chest.  On CT scan, the patient was found to have a right upper lobe necrotizing pneumonia.  Patient states she continues to have sharp chest pain which is exacerbated with activity and deep breathing.  She does also admit to intermittent fevers the last of which was 2 days ago.  Patient denies any nausea, vomiting, diarrhea, or other abdominal complaints.    Hospital Course:  No notes on file    Interval History: Patient seen and examined.  Continues to have difficulty maintaining IV access overnight.  Multiple attempts and multiple IVs started.  Patient also continues to complain of chest wall pain and cough.  Intermittently hypotensive.  Plan of care  discussed with the patient and the nurse at the bedside in detail.  Also discussed with cardiothoracic surgery.    Review of Systems   Constitutional: Positive for activity change, appetite change and fatigue.   Respiratory: Positive for cough and shortness of breath.    Cardiovascular: Negative for chest pain and leg swelling.   Gastrointestinal: Negative for abdominal pain and nausea.   Neurological: Negative for weakness.   Psychiatric/Behavioral: Negative for confusion.   All other systems reviewed and are negative.    Objective:     Vital Signs (Most Recent):  Temp: 97.7 °F (36.5 °C) (07/03/18 1151)  Pulse: 64 (07/03/18 1151)  Resp: 18 (07/03/18 1151)  BP: (!) 152/92 (07/03/18 1151)  SpO2: (!) 93 % (07/03/18 1151) Vital Signs (24h Range):  Temp:  [97.6 °F (36.4 °C)-97.9 °F (36.6 °C)] 97.7 °F (36.5 °C)  Pulse:  [62-77] 64  Resp:  [16-18] 18  SpO2:  [93 %-98 %] 93 %  BP: ()/(52-92) 152/92     Weight: 63.9 kg (140 lb 14 oz)  Body mass index is 22.74 kg/m².    Intake/Output Summary (Last 24 hours) at 07/03/18 1416  Last data filed at 07/03/18 0512   Gross per 24 hour   Intake             1170 ml   Output                0 ml   Net             1170 ml      Physical Exam   Constitutional: She is oriented to person, place, and time. She appears well-developed and well-nourished.   HENT:   Head: Normocephalic and atraumatic.   Eyes: Right eye exhibits no discharge. Left eye exhibits no discharge. No scleral icterus.   Cardiovascular: Normal rate and regular rhythm.  Exam reveals no gallop and no friction rub.    No murmur heard.  Pulmonary/Chest: Effort normal. She has no wheezes. She has no rales.   Decreased breath sounds noted at the right upper lung field.  No increased work of breathing is noted. Chest wall tenderness noted on palpation particularly of the right side.   Abdominal: Soft. Bowel sounds are normal. She exhibits no distension. There is no tenderness.   Musculoskeletal: She exhibits no edema or  tenderness.   Neurological: She is alert and oriented to person, place, and time. She has normal reflexes. No cranial nerve deficit.   Skin: No rash noted. No erythema.   Psychiatric: She has a normal mood and affect.   Nursing note and vitals reviewed.      Significant Labs: All pertinent labs within the past 24 hours have been reviewed.    Significant Imaging: I have reviewed all pertinent imaging results/findings within the past 24 hours.    Assessment/Plan:      * Necrotizing pneumonia    Patient was severe necrotizing pneumonia noted right upper lobe with possible right middle lobe involvement.  Will likely need surgery if no improvement with IV ABX.  Discussed with thoracic surgery will take the patient on Thursday for a right upper lobectomy and will monitor patient in the meantime.  Defer to pulmonology and CT surgery for further evaluation and management.  Continue patient IV antibiotics, steroids and monitor closely for signs of worsening inflammation.  QuantiFERON gold drawn, however TB risk remains extremely low.          Abscess of right lung with pneumonia    Treatment per above          Hemoptysis    Etiology likely related to severe pneumonia.  Treatment per above.  Malignancy unlikely, however not completely ruled out at this point.  No reported hemoptysis this admission, but did have prior to admission          Hypotension    etiology remains unclear, potentially related to medications and/or sepsis.  Lactic acid negative and patient is showed no other signs of systemic inflammatory response.  Will continue to monitor closely and continue midodrine and fluid boluses as needed.  If she does show signs of worsening systemic inflammation, would recommend transfer to ICU and initiation of pressors to maintain mean arterial pressure greater than 65.          Hypokalemia    Patient has hypokalemia which is currently controlled. Last electrolytes reviewed-     Recent Labs  Lab 07/02/18  0608  07/03/18  0444   K 3.7 4.1   . Will replace potassium and monitor electrolytes closely.             Caries    Possibly contributing to infection, will f/u blood cultures            VTE Risk Mitigation         Ordered     IP VTE LOW RISK PATIENT  Once      06/29/18 7922              Melvin Garcia MD  Department of Hospital Medicine   [facility]

## 2018-07-03 NOTE — PROGRESS NOTES
Progress Note  PULMONARY    Admit Date: 6/29/2018 07/03/2018      SUBJECTIVE:     June 30, still poor appetite, ambulated in room, cough less and sl better  Ju;y 1, still poor appetite, ambulates room, violent cough with dark mucous.  July 2, miserable still violent cough with nasty foul mocous 1/4 tsp , old blood present. Irate somewhat. Appetite poor attributes to nasty taste  July 3, less cough and pain better, bp better , feels sl better, eating but poor appetite, no hemoptysis but still nasty tasting mucous.        PFSH and Allergies reviewed.    OBJECTIVE:     Vitals (Most recent):  Vitals:    07/03/18 1536   BP: (!) 154/79   Pulse: 67   Resp: 18   Temp: 97 °F (36.1 °C)       Vitals (24 hour range):  Temp:  [97 °F (36.1 °C)-97.9 °F (36.6 °C)]   Pulse:  [62-74]   Resp:  [16-18]   BP: (114-154)/(69-92)   SpO2:  [93 %-98 %]       Intake/Output Summary (Last 24 hours) at 07/03/18 1734  Last data filed at 07/03/18 0512   Gross per 24 hour   Intake             1170 ml   Output                0 ml   Net             1170 ml          Physical Exam:  The patient's neuro status (alertness,orientation,cognitive function,motor skills,), pharyngeal exam (oral lesions, hygiene, abn dentition,), Neck (jvd,mass,thyroid,nodes in neck and above/below clavicle),RESPIRATORY(symmetry,effort,fremitus,percussion,auscultation),  Cor(rhythm,heart tones including gallops,perfusion,edema)ABD(distention,hepatic&splenomegaly,tenderness,masses), Skin(rash,cyanosis),Psyc(affect,judgement,).  Exam negative except for these pertinent findings:    Alert,no distress , chest is symmetric, no distress, normal percussion, normal fremitus and good normal breath sounds  No edeam     Radiographs reviewed: view by direct vision - July 1, sl better cxr       Labs       Recent Labs  Lab 07/03/18  0444   WBC 10.70   HGB 12.0   HCT 35.6*   *       Recent Labs  Lab 07/03/18  0444      K 4.1      CO2 21*   BUN 6   CREATININE 0.6   GLU  124*   CALCIUM 8.5*   MG 2.0   PHOS 4.0   AST 38   ALT 38   ALKPHOS 78   BILITOT 0.1   PROT 5.6*   ALBUMIN 1.6*   No results for input(s): PH, PCO2, PO2, HCO3 in the last 24 hours.  Microbiology Results (last 7 days)     Procedure Component Value Units Date/Time    AFB Culture & Smear [[id number]] Collected:  07/02/18 0451    Order Status:  Completed Specimen:  Respiratory from Sputum, Expectorated Updated:  07/03/18 1352     AFB CULTURE STAIN No acid fast bacilli seen.    Blood culture (site 2) [[id number]] Collected:  06/29/18 1328    Order Status:  Completed Specimen:  Blood from Antecubital, Left  Arm Updated:  07/02/18 2212     Blood Culture, Routine No Growth to date     Blood Culture, Routine No Growth to date     Blood Culture, Routine No Growth to date     Blood Culture, Routine No Growth to date    Narrative:       Site # 2, aerobic only    Blood culture (site 1) [[id number]] Collected:  06/29/18 1325    Order Status:  Completed Specimen:  Blood Updated:  07/02/18 2212     Blood Culture, Routine No Growth to date     Blood Culture, Routine No Growth to date     Blood Culture, Routine No Growth to date     Blood Culture, Routine No Growth to date    Narrative:       Site # 1, aerobic and anaerobic    Culture, Respiratory with Gram Stain [[id number]] Collected:  06/30/18 0700    Order Status:  Completed Specimen:  Respiratory from Sputum, Expectorated Updated:  07/02/18 1252     Respiratory Culture Normal respiratory alka     Gram Stain (Respiratory) <10 epithelial cells per low power field.     Gram Stain (Respiratory) No WBC's     Gram Stain (Respiratory) Many Gram positive cocci     Gram Stain (Respiratory) Many Gram negative rods    AFB Culture & Smear [[id number]] Collected:  06/30/18 0700    Order Status:  Completed Specimen:  Respiratory from Sputum, Expectorated Updated:  07/01/18 2127     AFB Culture & Smear Culture in progress     AFB CULTURE STAIN No acid fast bacilli seen.    AFB Culture & Smear  [[id number]] Collected:  06/30/18 0700    Order Status:  Canceled Specimen:  Respiratory from Sputum, Expectorated Updated:  06/30/18 0730          Impression:  Active Hospital Problems    Diagnosis  POA    *Necrotizing pneumonia [J85.0]  Yes    Hypotension [I95.9]  No    Hypokalemia [E87.6]  No    Hemoptysis [R04.2]  Yes    Abscess of right lung with pneumonia [J85.1]  Yes    Caries [K02.9]  Yes      Resolved Hospital Problems    Diagnosis Date Resolved POA   No resolved problems to display.               Plan:     June 30, gpc and gnr in sputum, discussed with Dr villeda and harriet.  Good chance will clear with abx alone. No rush to surgery.    July 1, reviewed cxr - lung abscess are in both rul and rll, no large cavities but lots of sm cavities- suspect will be slow to control with abx.  Pt having problems with violent coughing and mobilizes some purulent mucous - abscesses are not draining well.  bp low likely lingering sepsis.  MAP was 60 with smp 75 this am.  Discussed with Dr Almaraz.  Consider Chetta monitoring and more fluid boluses, will oreder low dose steroids and midodrine.  F/u cxr.  May need icu.  Hoping medical rx will work.  Sputum pending.    July 2, no staph from mucous- drop vanc soon- increase prednisone 10 tid,  norco schedule may help.   May need surg if no improvement soon?  Continue rx.  July 3 pt is responding to abx, she is better.  Surgery not unreasonable but chance she may clear without is good.  Discussed with pt - would recommend waiting a few more days prior to considering surgery.                            .

## 2018-07-03 NOTE — CONSULTS
Jesi Nagy [id number] is a 57 y.o. female who has been consulted for vancomycin dosing.    The patient has the following labs:     Date Creatinine (mg/dl)    BUN WBC Count   7/3/2018 Estimated Creatinine Clearance: 99.2 mL/min (based on SCr of 0.6 mg/dL). Lab Results   Component Value Date    BUN 6 07/03/2018     Lab Results   Component Value Date    WBC 10.70 07/03/2018        Current weight is 63.9 kg (140 lb 14 oz)    Pt is receiving vancomycin 1250 mg every 12 hours.  Vancomycin trough from 07/03 at 1725 was 22.0 mg/dL.  Target trough range is 15-20 mg/dL.   Trough was drawn on time and anticipate it is supra/therapeutic.  Pharmacy will decrease current doses to a vancomycin dose of 1250 mg every 12 hours. A vancomycin trough has been ordered prior to 4th dose due 07/05 at 0800.      Patient will be followed by pharmacy for changes in renal function, toxicity, and efficacy.  Thank you for allowing us to participate in this patient's care.     Jr Nelson, FazalD

## 2018-07-03 NOTE — PLAN OF CARE
Problem: Patient Care Overview  Goal: Plan of Care Review  Outcome: Ongoing (interventions implemented as appropriate)  Pt AAO x4, PIV in place, IVF and abx administered during shift.  Tele maintained and monitored.  Pain medication administered, parameters followed, adequate relief achieved. I/O monitored and documented.  VS stable.  Hourly rounding completed.  Pt has remained free of injuries and falls throughout shift.  Environment free of clutter, side rails up x2, and call light within reach.  Pt has verbalized understanding of plan of care.

## 2018-07-03 NOTE — CONSULTS
Jesi Nagy [id number] is a 57 y.o. female who has been consulted for vancomycin dosing.    Vancomycin trough has been changed to 7/3/18 at 1730, 30 minutes prior to 3rd dose.      Patient will be followed by pharmacy for changes in renal function, toxicity, and efficacy.    Thank you for allowing us to participate in this patient's care.     Miki Carranza, PharmD

## 2018-07-03 NOTE — ASSESSMENT & PLAN NOTE
Patient has hypokalemia which is currently controlled. Last electrolytes reviewed-     Recent Labs  Lab 07/02/18  0658 07/03/18  0444   K 3.7 4.1   . Will replace potassium and monitor electrolytes closely.

## 2018-07-03 NOTE — PLAN OF CARE
Problem: Patient Care Overview  Goal: Plan of Care Review  AAOx4, VSS throughout shift, B/P within normal limits, pt has R upper Picc that was placed today with no complications.  Last dose of midodrine was administered this morning, B/P has been stable, will continue to monitor. Continue tele monitoring. 22 g L FA remains in place, saline locked. Comfort level established, Good pain relief with PRN pain medication. Bed locked and low, call light within reach, will cont to monitor.

## 2018-07-03 NOTE — CONSULTS
Date: 7/2/2018   Jesi Nagy [id number] is a 57 y.o. female who has been consulted for vancomycin dosing.    The patient has the following labs:     Creatinine (mg/dl)    WBC Count   Serum creatinine: 0.6 mg/dL 07/02/18 0658  Estimated creatinine clearance: 99.2 mL/min Lab Results   Component Value Date    WBC 14.40 (H) 07/02/2018        Current weight is 63.9 kg (140 lb 14 oz)      Pt is receiving vancomycin 1500 mg every 12 hours.  Vancomycin trough from 7/2/2018  at 1735  was 18.7 mg/dL.  Target trough range is 15-20 mg/dL.   Trough was drawn on time and anticipate it is  Therapeutic. Pharmacy will continue current dose.   The vancomycin trough has been ordered for 7/4/18 at 0530.     Patient will be followed by pharmacy for changes in renal function, toxicity, and efficacy.    Thank you for allowing us to participate in this patient's care.     Brock Stanley, Pharmacist

## 2018-07-04 ENCOUNTER — OUTSIDE PLACE OF SERVICE (OUTPATIENT)
Dept: PULMONOLOGY | Facility: CLINIC | Age: 58
End: 2018-07-04
Payer: COMMERCIAL

## 2018-07-04 LAB
ALBUMIN SERPL BCP-MCNC: 1.7 G/DL
ALP SERPL-CCNC: 74 U/L
ALT SERPL W/O P-5'-P-CCNC: 33 U/L
ANION GAP SERPL CALC-SCNC: 6 MMOL/L
AST SERPL-CCNC: 34 U/L
BACTERIA BLD CULT: NORMAL
BACTERIA BLD CULT: NORMAL
BASOPHILS # BLD AUTO: 0 K/UL
BASOPHILS NFR BLD: 0.3 %
BILIRUB SERPL-MCNC: 0.1 MG/DL
BUN SERPL-MCNC: 9 MG/DL
CALCIUM SERPL-MCNC: 8.6 MG/DL
CHLORIDE SERPL-SCNC: 107 MMOL/L
CO2 SERPL-SCNC: 23 MMOL/L
CREAT SERPL-MCNC: 0.6 MG/DL
DIFFERENTIAL METHOD: ABNORMAL
EOSINOPHIL # BLD AUTO: 0 K/UL
EOSINOPHIL NFR BLD: 0.1 %
ERYTHROCYTE [DISTWIDTH] IN BLOOD BY AUTOMATED COUNT: 16.4 %
EST. GFR  (AFRICAN AMERICAN): >60 ML/MIN/1.73 M^2
EST. GFR  (NON AFRICAN AMERICAN): >60 ML/MIN/1.73 M^2
GLUCOSE SERPL-MCNC: 121 MG/DL
HCT VFR BLD AUTO: 33.7 %
HGB BLD-MCNC: 11.4 G/DL
LYMPHOCYTES # BLD AUTO: 1.2 K/UL
LYMPHOCYTES NFR BLD: 11.9 %
MAGNESIUM SERPL-MCNC: 1.9 MG/DL
MCH RBC QN AUTO: 33.7 PG
MCHC RBC AUTO-ENTMCNC: 33.7 G/DL
MCV RBC AUTO: 100 FL
MONOCYTES # BLD AUTO: 0.3 K/UL
MONOCYTES NFR BLD: 3.3 %
NEUTROPHILS # BLD AUTO: 8.4 K/UL
NEUTROPHILS NFR BLD: 84.4 %
PHOSPHATE SERPL-MCNC: 4 MG/DL
PLATELET # BLD AUTO: 521 K/UL
PMV BLD AUTO: 8.8 FL
POTASSIUM SERPL-SCNC: 3.9 MMOL/L
PROT SERPL-MCNC: 5.9 G/DL
RBC # BLD AUTO: 3.37 M/UL
SODIUM SERPL-SCNC: 136 MMOL/L
WBC # BLD AUTO: 10 K/UL

## 2018-07-04 PROCEDURE — 63600175 PHARM REV CODE 636 W HCPCS: Performed by: INTERNAL MEDICINE

## 2018-07-04 PROCEDURE — 84100 ASSAY OF PHOSPHORUS: CPT

## 2018-07-04 PROCEDURE — 80053 COMPREHEN METABOLIC PANEL: CPT

## 2018-07-04 PROCEDURE — 94761 N-INVAS EAR/PLS OXIMETRY MLT: CPT

## 2018-07-04 PROCEDURE — 25000003 PHARM REV CODE 250: Performed by: HOSPITALIST

## 2018-07-04 PROCEDURE — 83735 ASSAY OF MAGNESIUM: CPT

## 2018-07-04 PROCEDURE — 36415 COLL VENOUS BLD VENIPUNCTURE: CPT

## 2018-07-04 PROCEDURE — 25000003 PHARM REV CODE 250: Performed by: INTERNAL MEDICINE

## 2018-07-04 PROCEDURE — 63600175 PHARM REV CODE 636 W HCPCS: Performed by: HOSPITALIST

## 2018-07-04 PROCEDURE — 11000001 HC ACUTE MED/SURG PRIVATE ROOM

## 2018-07-04 PROCEDURE — 99232 SBSQ HOSP IP/OBS MODERATE 35: CPT | Mod: ,,, | Performed by: INTERNAL MEDICINE

## 2018-07-04 PROCEDURE — A4216 STERILE WATER/SALINE, 10 ML: HCPCS | Performed by: HOSPITALIST

## 2018-07-04 PROCEDURE — 85025 COMPLETE CBC W/AUTO DIFF WBC: CPT

## 2018-07-04 RX ADMIN — PIPERACILLIN SODIUM AND TAZOBACTAM SODIUM 4.5 G: 4; .5 INJECTION, POWDER, LYOPHILIZED, FOR SOLUTION INTRAVENOUS at 04:07

## 2018-07-04 RX ADMIN — VANCOMYCIN HYDROCHLORIDE 1250 MG: 1 INJECTION, POWDER, LYOPHILIZED, FOR SOLUTION INTRAVENOUS at 09:07

## 2018-07-04 RX ADMIN — HYDROCODONE POLISTIREX AND CHLORPHENIRAMINE POLISITREX 5 ML: 10; 8 SUSPENSION, EXTENDED RELEASE ORAL at 08:07

## 2018-07-04 RX ADMIN — HYDROCODONE BITARTRATE AND ACETAMINOPHEN 1 TABLET: 5; 325 TABLET ORAL at 12:07

## 2018-07-04 RX ADMIN — HYDROCODONE BITARTRATE AND ACETAMINOPHEN 1 TABLET: 5; 325 TABLET ORAL at 06:07

## 2018-07-04 RX ADMIN — SODIUM CHLORIDE, PRESERVATIVE FREE 10 ML: 5 INJECTION INTRAVENOUS at 06:07

## 2018-07-04 RX ADMIN — IBUPROFEN 600 MG: 600 TABLET ORAL at 08:07

## 2018-07-04 RX ADMIN — PIPERACILLIN SODIUM AND TAZOBACTAM SODIUM 4.5 G: 4; .5 INJECTION, POWDER, LYOPHILIZED, FOR SOLUTION INTRAVENOUS at 06:07

## 2018-07-04 RX ADMIN — PIPERACILLIN SODIUM AND TAZOBACTAM SODIUM 4.5 G: 4; .5 INJECTION, POWDER, LYOPHILIZED, FOR SOLUTION INTRAVENOUS at 12:07

## 2018-07-04 RX ADMIN — VANCOMYCIN HYDROCHLORIDE 1250 MG: 1 INJECTION, POWDER, LYOPHILIZED, FOR SOLUTION INTRAVENOUS at 08:07

## 2018-07-04 RX ADMIN — HYDROCODONE POLISTIREX AND CHLORPHENIRAMINE POLISITREX 5 ML: 10; 8 SUSPENSION, EXTENDED RELEASE ORAL at 09:07

## 2018-07-04 RX ADMIN — SODIUM CHLORIDE, PRESERVATIVE FREE 10 ML: 5 INJECTION INTRAVENOUS at 12:07

## 2018-07-04 RX ADMIN — IBUPROFEN 600 MG: 600 TABLET ORAL at 09:07

## 2018-07-04 RX ADMIN — PREDNISONE 10 MG: 5 TABLET ORAL at 08:07

## 2018-07-04 RX ADMIN — PREDNISONE 10 MG: 5 TABLET ORAL at 03:07

## 2018-07-04 RX ADMIN — PREDNISONE 10 MG: 5 TABLET ORAL at 09:07

## 2018-07-04 RX ADMIN — IBUPROFEN 600 MG: 600 TABLET ORAL at 12:07

## 2018-07-04 RX ADMIN — IBUPROFEN 600 MG: 600 TABLET ORAL at 04:07

## 2018-07-04 RX ADMIN — RAMELTEON 8 MG: 8 TABLET, FILM COATED ORAL at 10:07

## 2018-07-04 NOTE — SUBJECTIVE & OBJECTIVE
Interval History: Patient seen and examined. No acute evnets overnight.  Hemodynamics improved with no intermittent hypotension.  Otherwise feeling quite well. Plans for lobectomy tomorrow.     Review of Systems   Constitutional: Negative for activity change, appetite change and fever.   HENT: Negative.    Eyes: Negative.    Respiratory: Positive for shortness of breath. Negative for chest tightness and wheezing.    Cardiovascular: Negative for chest pain, palpitations and leg swelling.   Gastrointestinal: Negative for abdominal distention, abdominal pain, blood in stool, diarrhea and vomiting.   Genitourinary: Negative for dysuria and hematuria.   Neurological: Negative for headaches.   Hematological: Negative for adenopathy.   Psychiatric/Behavioral: Negative for confusion.     Objective:     Vital Signs (Most Recent):  Temp: 97.9 °F (36.6 °C) (07/04/18 0807)  Pulse: 64 (07/04/18 0807)  Resp: 16 (07/04/18 0807)  BP: 136/79 (07/04/18 0807)  SpO2: (!) 93 % (07/04/18 0807) Vital Signs (24h Range):  Temp:  [97 °F (36.1 °C)-97.9 °F (36.6 °C)] 97.9 °F (36.6 °C)  Pulse:  [62-78] 64  Resp:  [14-19] 16  SpO2:  [93 %-98 %] 93 %  BP: (130-154)/(74-92) 136/79     Weight: 63.9 kg (140 lb 14 oz)  Body mass index is 22.74 kg/m².    Intake/Output Summary (Last 24 hours) at 07/04/18 1028  Last data filed at 07/04/18 0300   Gross per 24 hour   Intake             1500 ml   Output                0 ml   Net             1500 ml      Physical Exam   Constitutional: She is oriented to person, place, and time. She appears well-developed and well-nourished. No distress.   HENT:   Head: Normocephalic and atraumatic.   Eyes: Pupils are equal, round, and reactive to light.   Neck: Neck supple. No thyromegaly present.   Cardiovascular: Normal rate and regular rhythm.  Exam reveals no gallop and no friction rub.    No murmur heard.  Pulmonary/Chest: Effort normal and breath sounds normal. No respiratory distress. She has no wheezes.    Abdominal: Soft. Bowel sounds are normal. She exhibits no distension. There is no tenderness. There is no guarding.   Musculoskeletal: Normal range of motion. She exhibits no edema.   Neurological: She is alert and oriented to person, place, and time.   Skin: Skin is warm and dry. No erythema.   Psychiatric: She has a normal mood and affect.       Significant Labs:   CBC:   Recent Labs  Lab 07/03/18  0444 07/04/18  0428   WBC 10.70 10.00   HGB 12.0 11.4*   HCT 35.6* 33.7*   * 521*       Significant Imaging: I have reviewed all pertinent imaging results/findings within the past 24 hours.

## 2018-07-04 NOTE — PROGRESS NOTES
[facility] Hospital Medicine  Progress Note    Patient Name: Jesi Nagy  MRN: [id number]  Patient Class: IP- Inpatient   Admission Date: 6/29/2018  Length of Stay: 5 days  Attending Physician: Chet Roldan MD  Primary Care Provider: Primary Doctor No        Subjective:     Principal Problem:Necrotizing pneumonia    HPI:  Patient is a 57-year-old  female with a past medical history significant for anxiety who presents to the hospital after having being referred from the clinic.  Patient had upper respiratory symptoms for approximately 6 months prior to admission.  Showed intermittent periods of cough, bloody sputum, and fever up to 102.  Denies any night sweats or weight loss.  Each time, the patient had presented to urgent care and started on antibiotics for short course in which she transiently improved but then relapsed again approximately 1-2 weeks later.  Patient has been through per her own description at least 5 courses of oral antibiotics and has continued to have intermittent spiking fevers in between regimens of antibiotics.  She sought care in the [facility] and was initially found to have an abnormality on chest x-ray and markedly elevated inflammatory markers which prompted further evaluation by CT scan of the chest.  On CT scan, the patient was found to have a right upper lobe necrotizing pneumonia.  Patient states she continues to have sharp chest pain which is exacerbated with activity and deep breathing.  She does also admit to intermittent fevers the last of which was 2 days ago.  Patient denies any nausea, vomiting, diarrhea, or other abdominal complaints.    Hospital Course:  No notes on file    Interval History: Patient seen and examined. No acute evnets overnight.  Hemodynamics improved with no intermittent hypotension.  Otherwise feeling quite well. Plans for lobectomy tomorrow.     Review of Systems   Constitutional: Negative for activity  change, appetite change and fever.   HENT: Negative.    Eyes: Negative.    Respiratory: Positive for shortness of breath. Negative for chest tightness and wheezing.    Cardiovascular: Negative for chest pain, palpitations and leg swelling.   Gastrointestinal: Negative for abdominal distention, abdominal pain, blood in stool, diarrhea and vomiting.   Genitourinary: Negative for dysuria and hematuria.   Neurological: Negative for headaches.   Hematological: Negative for adenopathy.   Psychiatric/Behavioral: Negative for confusion.     Objective:     Vital Signs (Most Recent):  Temp: 97.9 °F (36.6 °C) (07/04/18 0807)  Pulse: 64 (07/04/18 0807)  Resp: 16 (07/04/18 0807)  BP: 136/79 (07/04/18 0807)  SpO2: (!) 93 % (07/04/18 0807) Vital Signs (24h Range):  Temp:  [97 °F (36.1 °C)-97.9 °F (36.6 °C)] 97.9 °F (36.6 °C)  Pulse:  [62-78] 64  Resp:  [14-19] 16  SpO2:  [93 %-98 %] 93 %  BP: (130-154)/(74-92) 136/79     Weight: 63.9 kg (140 lb 14 oz)  Body mass index is 22.74 kg/m².    Intake/Output Summary (Last 24 hours) at 07/04/18 1028  Last data filed at 07/04/18 0300   Gross per 24 hour   Intake             1500 ml   Output                0 ml   Net             1500 ml      Physical Exam   Constitutional: She is oriented to person, place, and time. She appears well-developed and well-nourished. No distress.   HENT:   Head: Normocephalic and atraumatic.   Eyes: Pupils are equal, round, and reactive to light.   Neck: Neck supple. No thyromegaly present.   Cardiovascular: Normal rate and regular rhythm.  Exam reveals no gallop and no friction rub.    No murmur heard.  Pulmonary/Chest: Effort normal and breath sounds normal. No respiratory distress. She has no wheezes.   Abdominal: Soft. Bowel sounds are normal. She exhibits no distension. There is no tenderness. There is no guarding.   Musculoskeletal: Normal range of motion. She exhibits no edema.   Neurological: She is alert and oriented to person, place, and time.   Skin:  Skin is warm and dry. No erythema.   Psychiatric: She has a normal mood and affect.       Significant Labs:   CBC:   Recent Labs  Lab 07/03/18  0444 07/04/18  0428   WBC 10.70 10.00   HGB 12.0 11.4*   HCT 35.6* 33.7*   * 521*       Significant Imaging: I have reviewed all pertinent imaging results/findings within the past 24 hours.    Assessment/Plan:      * Necrotizing pneumonia    Patient was severe necrotizing pneumonia noted right upper lobe with possible right middle lobe involvement.  Will likely need surgery if no improvement with IV ABX.  Discussed with thoracic surgery will take the patient on Thursday for a right upper lobectomy and will monitor patient in the meantime.  Defer to pulmonology and CT surgery for further evaluation and management.  Continue patient IV antibiotics, steroids and monitor closely for signs of worsening inflammation.  QuantiFERON gold drawn, however TB risk remains extremely low.    Much improved today and no intermittent hypotension.  Discussed potential ICU stay after procedure but overall patient a good candidate for procedure.           Hypotension    - Resolved with no reported episodes of hypotension overnight  - Continue to monitor           Hypokalemia    Patient has hypokalemia which is currently controlled. Last electrolytes reviewed-     Recent Labs  Lab 07/02/18  0658 07/03/18  0444   K 3.7 4.1   . Will replace potassium and monitor electrolytes closely.             Caries    Possibly contributing to infection, will f/u blood cultures          Abscess of right lung with pneumonia    Treatment per above          Hemoptysis    Etiology likely related to severe pneumonia.  Treatment per above.  Malignancy unlikely, however not completely ruled out at this point.  No reported hemoptysis this admission, but did have prior to admission            VTE Risk Mitigation         Ordered     IP VTE LOW RISK PATIENT  Once      06/29/18 0983              Chet Roldan  MD  Department of Hospital Medicine   [facility]

## 2018-07-04 NOTE — PROGRESS NOTES
07/03/18 2026   Patient Assessment/Suction   Level of Consciousness (AVPU) alert   PRE-TX-O2-ETCO2   O2 Device (Oxygen Therapy) room air   SpO2 97 %   Pulse Oximetry Type Intermittent   Pulse 68

## 2018-07-04 NOTE — PLAN OF CARE
Problem: Patient Care Overview  Goal: Plan of Care Review  Outcome: Ongoing (interventions implemented as appropriate)  Pt AAO x4, PICC in place, IVF and abx administered during shift.  Tele maintained and monitored.  Pain medication administered, parameters followed, adequate relief achieved. I/O monitored and documented.  VS stable.  Hourly rounding completed.  Pt has remained free of injuries and falls throughout shift.  Environment free of clutter, side rails up x2, and call light within reach.  Pt has verbalized understanding of plan of care.

## 2018-07-04 NOTE — ASSESSMENT & PLAN NOTE
Patient was severe necrotizing pneumonia noted right upper lobe with possible right middle lobe involvement.  Will likely need surgery if no improvement with IV ABX.  Discussed with thoracic surgery will take the patient on Thursday for a right upper lobectomy and will monitor patient in the meantime.  Defer to pulmonology and CT surgery for further evaluation and management.  Continue patient IV antibiotics, steroids and monitor closely for signs of worsening inflammation.  QuantiFERON gold drawn, however TB risk remains extremely low.    Much improved today and no intermittent hypotension.  Discussed potential ICU stay after procedure but overall patient a good candidate for procedure.

## 2018-07-04 NOTE — PROGRESS NOTES
"Progress Note  Pulmonary/Critical Care      Admit Date: 6/29/2018    SUBJECTIVE:     HPI/Interval history (See H&P for complete P,F,SHx) :     7/4/2018 - Stable overnight, no new complaints, still with cough and purulent secretions.  She is not very talkative.  Dr Peralta's note to me states "I don't think she will need surgery".    Review of Systems: List if applicable    Pain scale: 2/10    Review of Systems   Constitutional: Positive for malaise/fatigue. Negative for chills, diaphoresis, fever and weight loss.   HENT: Negative for congestion.    Eyes: Negative for pain.   Respiratory: Positive for cough and sputum production. Negative for hemoptysis, shortness of breath, wheezing and stridor.    Cardiovascular: Negative for chest pain, palpitations, orthopnea, claudication, leg swelling and PND.   Gastrointestinal: Negative for abdominal pain, constipation, diarrhea, heartburn, nausea and vomiting.   Genitourinary: Negative for dysuria, frequency and urgency.   Musculoskeletal: Negative for falls and myalgias.   Neurological: Positive for weakness. Negative for sensory change and focal weakness.   Psychiatric/Behavioral: Negative for depression. The patient is not nervous/anxious.        OBJECTIVE:     Vital Signs Range (Last 24H):  Temp:  [96.8 °F (36 °C)-97.9 °F (36.6 °C)]   Pulse:  [62-78]   Resp:  [14-19]   BP: (130-163)/(74-84)   SpO2:  [93 %-98 %]     I & O (Last 24H):    Intake/Output Summary (Last 24 hours) at 07/04/18 1346  Last data filed at 07/04/18 0300   Gross per 24 hour   Intake             1500 ml   Output                0 ml   Net             1500 ml       Estimated body mass index is 22.74 kg/m² as calculated from the following:    Height as of this encounter: 5' 6" (1.676 m).    Weight as of this encounter: 63.9 kg (140 lb 14 oz).    Vent Settings-      ABG  No results for input(s): PH, PO2, PCO2, HCO3, BE in the last 168 hours.    Physical Exam:  Physical Exam   Constitutional: She is oriented to " person, place, and time and well-developed, well-nourished, and in no distress. No distress.   HENT:   Head: Normocephalic and atraumatic.   Eyes: Conjunctivae are normal. Pupils are equal, round, and reactive to light.   Neck: Normal range of motion. Neck supple. No JVD present. No tracheal deviation present. No thyromegaly present.   Cardiovascular: Normal rate, regular rhythm and normal heart sounds.  Exam reveals no gallop and no friction rub.    No murmur heard.  Pulmonary/Chest: Effort normal and breath sounds normal. No stridor. No respiratory distress. She has no wheezes. She has no rales.   Abdominal: Soft. Bowel sounds are normal. She exhibits no distension. There is no tenderness. There is no rebound.   Musculoskeletal: Normal range of motion. She exhibits no edema or tenderness.   Neurological: She is alert and oriented to person, place, and time.   Skin: Skin is warm and dry. She is not diaphoretic.   Psychiatric:   Seems depressed, poor eye contact   Vitals reviewed.      Laboratory/Diagnostic Data:    Recent Results (from the past 336 hour(s))   CBC with Automated Differential    Collection Time: 07/04/18  4:28 AM   Result Value Ref Range    WBC 10.00 3.90 - 12.70 K/uL    Hemoglobin 11.4 (L) 12.0 - 16.0 g/dL    Hematocrit 33.7 (L) 37.0 - 48.5 %    Platelets 521 (H) 150 - 350 K/uL   CBC with Automated Differential    Collection Time: 07/03/18  4:44 AM   Result Value Ref Range    WBC 10.70 3.90 - 12.70 K/uL    Hemoglobin 12.0 12.0 - 16.0 g/dL    Hematocrit 35.6 (L) 37.0 - 48.5 %    Platelets 482 (H) 150 - 350 K/uL   CBC with Automated Differential    Collection Time: 07/02/18  6:58 AM   Result Value Ref Range    WBC 14.40 (H) 3.90 - 12.70 K/uL    Hemoglobin 11.0 (L) 12.0 - 16.0 g/dL    Hematocrit 33.0 (L) 37.0 - 48.5 %    Platelets 444 (H) 150 - 350 K/uL       No results found for this or any previous visit (from the past 336 hour(s)).    Lab Results   Component Value Date    ALT 33 07/04/2018    AST 34  07/04/2018    ALKPHOS 74 07/04/2018    BILITOT 0.1 07/04/2018       No results for input(s): PT in the last 24 hours.    Invalid input(s):  INR,  APTT    Microbiology    Microbiology Results (last 7 days)     Procedure Component Value Units Date/Time    Blood culture (site 1) [[id number]] Collected:  06/29/18 1325    Order Status:  Completed Specimen:  Blood Updated:  07/03/18 2212     Blood Culture, Routine No Growth to date     Blood Culture, Routine No Growth to date     Blood Culture, Routine No Growth to date     Blood Culture, Routine No Growth to date     Blood Culture, Routine No Growth to date    Narrative:       Site # 1, aerobic and anaerobic    Blood culture (site 2) [[id number]] Collected:  06/29/18 1328    Order Status:  Completed Specimen:  Blood from Antecubital, Left  Arm Updated:  07/03/18 2212     Blood Culture, Routine No Growth to date     Blood Culture, Routine No Growth to date     Blood Culture, Routine No Growth to date     Blood Culture, Routine No Growth to date     Blood Culture, Routine No Growth to date    Narrative:       Site # 2, aerobic only    AFB Culture & Smear [[id number]] Collected:  07/02/18 0451    Order Status:  Completed Specimen:  Respiratory from Sputum, Expectorated Updated:  07/03/18 2127     AFB Culture & Smear Culture in progress     AFB CULTURE STAIN No acid fast bacilli seen.    Culture, Respiratory with Gram Stain [[id number]] Collected:  06/30/18 0700    Order Status:  Completed Specimen:  Respiratory from Sputum, Expectorated Updated:  07/02/18 1252     Respiratory Culture Normal respiratory alka     Gram Stain (Respiratory) <10 epithelial cells per low power field.     Gram Stain (Respiratory) No WBC's     Gram Stain (Respiratory) Many Gram positive cocci     Gram Stain (Respiratory) Many Gram negative rods    AFB Culture & Smear [[id number]] Collected:  06/30/18 0700    Order Status:  Completed Specimen:  Respiratory from Sputum, Expectorated Updated:  07/01/18  2127     AFB Culture & Smear Culture in progress     AFB CULTURE STAIN No acid fast bacilli seen.    AFB Culture & Smear [[id number]] Collected:  06/30/18 0700    Order Status:  Canceled Specimen:  Respiratory from Sputum, Expectorated Updated:  06/30/18 0730          Radiology    Imaging Results    None           Medications:     HYDROcodone-acetaminophen  1 tablet Oral Q6H    hydrocodone-chlorpheniramine  5 mL Oral Q12H    ibuprofen  600 mg Oral QID    piperacillin-tazobactam (ZOSYN) IVPB  4.5 g Intravenous Q6H    predniSONE  10 mg Oral TID    ramelteon  8 mg Oral QHS    sodium chloride 0.9%  10 mL Intravenous Q6H    vancomycin (VANCOCIN) IVPB  1,250 mg Intravenous Q12H           acetaminophen, dextrose 50%, dextrose 50%, diphenhydrAMINE, glucagon (human recombinant), glucose, glucose, magnesium oxide, magnesium oxide, ondansetron, potassium chloride 10%, Flushing PICC Protocol **AND** sodium chloride 0.9% **AND** sodium chloride 0.9%, sodium chloride 0.9%    ASSESSMENT/PLAN:     Active Problems:    Active Hospital Problems    Diagnosis  POA    *Necrotizing pneumonia [J85.0]  Yes    Hypotension [I95.9]  No    Hypokalemia [E87.6]  No    Hemoptysis [R04.2]  Yes    Abscess of right lung with pneumonia [J85.1]  Yes    Caries [K02.9]  Yes      Resolved Hospital Problems    Diagnosis Date Resolved POA   No resolved problems to display.     Pneumonia, necrotizing  - seems to be responding to therapy  - agree that we should wait a bit before decision about surgery, she may not need it if she continues to improve.    I will continue to follow with the pt.  Please call me at [phone] if you have any questions.      Chuy Salazar MD

## 2018-07-05 PROBLEM — E44.0 MODERATE MALNUTRITION: Status: ACTIVE | Noted: 2018-07-05

## 2018-07-05 PROBLEM — I95.9 HYPOTENSION: Status: RESOLVED | Noted: 2018-07-02 | Resolved: 2018-07-05

## 2018-07-05 PROBLEM — E43 PROTEIN-CALORIE MALNUTRITION, SEVERE: Status: ACTIVE | Noted: 2018-07-05

## 2018-07-05 LAB
ALBUMIN SERPL BCP-MCNC: 1.8 G/DL
ALP SERPL-CCNC: 61 U/L
ALT SERPL W/O P-5'-P-CCNC: 38 U/L
ANION GAP SERPL CALC-SCNC: 8 MMOL/L
AST SERPL-CCNC: 38 U/L
BASOPHILS # BLD AUTO: 0.1 K/UL
BASOPHILS NFR BLD: 0.6 %
BILIRUB SERPL-MCNC: 0.1 MG/DL
BUN SERPL-MCNC: 8 MG/DL
CALCIUM SERPL-MCNC: 8.5 MG/DL
CHLORIDE SERPL-SCNC: 106 MMOL/L
CO2 SERPL-SCNC: 24 MMOL/L
CREAT SERPL-MCNC: 0.6 MG/DL
DIFFERENTIAL METHOD: ABNORMAL
EOSINOPHIL # BLD AUTO: 0 K/UL
EOSINOPHIL NFR BLD: 0.1 %
ERYTHROCYTE [DISTWIDTH] IN BLOOD BY AUTOMATED COUNT: 17.1 %
EST. GFR  (AFRICAN AMERICAN): >60 ML/MIN/1.73 M^2
EST. GFR  (NON AFRICAN AMERICAN): >60 ML/MIN/1.73 M^2
GLUCOSE SERPL-MCNC: 116 MG/DL
HCT VFR BLD AUTO: 31.6 %
HGB BLD-MCNC: 10.7 G/DL
LYMPHOCYTES # BLD AUTO: 1.7 K/UL
LYMPHOCYTES NFR BLD: 16.7 %
MAGNESIUM SERPL-MCNC: 1.8 MG/DL
MCH RBC QN AUTO: 33.6 PG
MCHC RBC AUTO-ENTMCNC: 33.8 G/DL
MCV RBC AUTO: 99 FL
MONOCYTES # BLD AUTO: 0.3 K/UL
MONOCYTES NFR BLD: 3.1 %
NEUTROPHILS # BLD AUTO: 8.1 K/UL
NEUTROPHILS NFR BLD: 79.5 %
PHOSPHATE SERPL-MCNC: 3.7 MG/DL
PLATELET # BLD AUTO: 495 K/UL
PMV BLD AUTO: 8.4 FL
POTASSIUM SERPL-SCNC: 3.9 MMOL/L
PROT SERPL-MCNC: 5.6 G/DL
RBC # BLD AUTO: 3.18 M/UL
SODIUM SERPL-SCNC: 138 MMOL/L
VANCOMYCIN TROUGH SERPL-MCNC: 23.6 UG/ML
WBC # BLD AUTO: 10.1 K/UL

## 2018-07-05 PROCEDURE — 85025 COMPLETE CBC W/AUTO DIFF WBC: CPT

## 2018-07-05 PROCEDURE — 11000001 HC ACUTE MED/SURG PRIVATE ROOM

## 2018-07-05 PROCEDURE — S0077 INJECTION, CLINDAMYCIN PHOSP: HCPCS | Performed by: HOSPITALIST

## 2018-07-05 PROCEDURE — 99232 SBSQ HOSP IP/OBS MODERATE 35: CPT | Mod: ,,, | Performed by: INTERNAL MEDICINE

## 2018-07-05 PROCEDURE — 80202 ASSAY OF VANCOMYCIN: CPT

## 2018-07-05 PROCEDURE — 25000003 PHARM REV CODE 250: Performed by: HOSPITALIST

## 2018-07-05 PROCEDURE — 83735 ASSAY OF MAGNESIUM: CPT

## 2018-07-05 PROCEDURE — 97802 MEDICAL NUTRITION INDIV IN: CPT

## 2018-07-05 PROCEDURE — 94761 N-INVAS EAR/PLS OXIMETRY MLT: CPT

## 2018-07-05 PROCEDURE — 80053 COMPREHEN METABOLIC PANEL: CPT

## 2018-07-05 PROCEDURE — A4216 STERILE WATER/SALINE, 10 ML: HCPCS | Performed by: HOSPITALIST

## 2018-07-05 PROCEDURE — 36415 COLL VENOUS BLD VENIPUNCTURE: CPT

## 2018-07-05 PROCEDURE — 63600175 PHARM REV CODE 636 W HCPCS: Performed by: INTERNAL MEDICINE

## 2018-07-05 PROCEDURE — 25000003 PHARM REV CODE 250: Performed by: INTERNAL MEDICINE

## 2018-07-05 PROCEDURE — 84100 ASSAY OF PHOSPHORUS: CPT

## 2018-07-05 RX ORDER — CLINDAMYCIN PHOSPHATE 900 MG/50ML
900 INJECTION, SOLUTION INTRAVENOUS
Status: DISCONTINUED | OUTPATIENT
Start: 2018-07-05 | End: 2018-07-06 | Stop reason: HOSPADM

## 2018-07-05 RX ADMIN — HYDROCODONE POLISTIREX AND CHLORPHENIRAMINE POLISITREX 5 ML: 10; 8 SUSPENSION, EXTENDED RELEASE ORAL at 09:07

## 2018-07-05 RX ADMIN — IBUPROFEN 600 MG: 600 TABLET ORAL at 09:07

## 2018-07-05 RX ADMIN — PIPERACILLIN SODIUM AND TAZOBACTAM SODIUM 4.5 G: 4; .5 INJECTION, POWDER, LYOPHILIZED, FOR SOLUTION INTRAVENOUS at 11:07

## 2018-07-05 RX ADMIN — RAMELTEON 8 MG: 8 TABLET, FILM COATED ORAL at 09:07

## 2018-07-05 RX ADMIN — HYDROCODONE BITARTRATE AND ACETAMINOPHEN 1 TABLET: 5; 325 TABLET ORAL at 05:07

## 2018-07-05 RX ADMIN — IBUPROFEN 600 MG: 600 TABLET ORAL at 05:07

## 2018-07-05 RX ADMIN — HYDROCODONE BITARTRATE AND ACETAMINOPHEN 1 TABLET: 5; 325 TABLET ORAL at 11:07

## 2018-07-05 RX ADMIN — CLINDAMYCIN IN 5 PERCENT DEXTROSE 900 MG: 18 INJECTION, SOLUTION INTRAVENOUS at 10:07

## 2018-07-05 RX ADMIN — SODIUM CHLORIDE, PRESERVATIVE FREE 10 ML: 5 INJECTION INTRAVENOUS at 11:07

## 2018-07-05 RX ADMIN — PREDNISONE 10 MG: 5 TABLET ORAL at 02:07

## 2018-07-05 RX ADMIN — SODIUM CHLORIDE, PRESERVATIVE FREE 10 ML: 5 INJECTION INTRAVENOUS at 12:07

## 2018-07-05 RX ADMIN — HYDROCODONE BITARTRATE AND ACETAMINOPHEN 1 TABLET: 5; 325 TABLET ORAL at 12:07

## 2018-07-05 RX ADMIN — SODIUM CHLORIDE, PRESERVATIVE FREE 10 ML: 5 INJECTION INTRAVENOUS at 05:07

## 2018-07-05 RX ADMIN — CLINDAMYCIN IN 5 PERCENT DEXTROSE 900 MG: 18 INJECTION, SOLUTION INTRAVENOUS at 02:07

## 2018-07-05 RX ADMIN — HYDROCODONE BITARTRATE AND ACETAMINOPHEN 1 TABLET: 5; 325 TABLET ORAL at 06:07

## 2018-07-05 RX ADMIN — PREDNISONE 10 MG: 5 TABLET ORAL at 09:07

## 2018-07-05 RX ADMIN — PIPERACILLIN SODIUM AND TAZOBACTAM SODIUM 4.5 G: 4; .5 INJECTION, POWDER, LYOPHILIZED, FOR SOLUTION INTRAVENOUS at 05:07

## 2018-07-05 RX ADMIN — PIPERACILLIN SODIUM AND TAZOBACTAM SODIUM 4.5 G: 4; .5 INJECTION, POWDER, LYOPHILIZED, FOR SOLUTION INTRAVENOUS at 12:07

## 2018-07-05 RX ADMIN — IBUPROFEN 600 MG: 600 TABLET ORAL at 02:07

## 2018-07-05 NOTE — CONSULTS
"  [facility]  Adult Nutrition  Consult Note    SUMMARY     Recommendations    1) continue regular diet with pt preferences 2) add Boost Plus, with breakfast  Goals: 1) Pt will consume at least 75% of meals by RD f/u.  2) Pt will use ONS during admit  Nutrition Goal Status: new  Communication of RACHEL Recs: reviewed with RN    Reason for Assessment    Reason for Assessment: consult  1. Hemoptysis    2. Necrotizing pneumonia    3. Abscess of right lung with pneumonia, unspecified part of lung      History reviewed. No pertinent past medical history.  Interdisciplinary Rounds: attended  General Information Comments: Admitted with SOB.  Reports poor appetite ~4 months.  Notes  lbs and that she has not noticed wt loss.  Minimal wt hx in chart.  NFPE completed.  Moderate fat and muscle loss.     Nutrition Risk Screen    Nutrition Risk Screen: no indicators present    Nutrition/Diet History    Patient Reported Diet/Restrictions/Preferences: general  Food Preferences: No intolerances noted.  Does not use ONS at home. willing to try Boost Plus, chocolate  Do you have any cultural, spiritual, Restorationist conflicts, given your current situation?: none  Food Allergies: NKFA    Anthropometrics    Temp: 98.1 °F (36.7 °C)  Height Method: Stated  Height: 5' 6"  Height (inches): 66 in  Weight Method: Standard Scale  Weight: 63.9 kg (140 lb 14 oz)  Weight (lb): 140.88 lb  Ideal Body Weight (IBW), Female: 130 lb  % Ideal Body Weight, Female (lb): 108.37 lb  BMI (Calculated): 22.8  BMI Grade: 18.5-24.9 - normal  Usual Body Weight (UBW), k.3 kg (per chart review 18)  % Usual Body Weight: 102.78  % Weight Change From Usual Weight: 2.57 %       Lab/Procedures/Meds    Pertinent Labs Reviewed: reviewed  Lab Results   Component Value Date    ALBUMIN 1.8 (L) 2018     Lab Results   Component Value Date    .7 (H) 2018     Pertinent Medications Reviewed: reviewed  Scheduled Meds:   clindamycin " (CLEOCIN) IVPB  900 mg Intravenous Q8H    HYDROcodone-acetaminophen  1 tablet Oral Q6H    hydrocodone-chlorpheniramine  5 mL Oral Q12H    ibuprofen  600 mg Oral QID    predniSONE  10 mg Oral TID    ramelteon  8 mg Oral QHS    sodium chloride 0.9%  10 mL Intravenous Q6H     Continuous Infusions:    Physical Findings/Assessment    Oral/Mouth Cavity: decay/carries  Skin:  (Leander score 21)    Estimated/Assessed Needs    Weight Used For Calorie Calculations: 63.9 kg (140 lb 14 oz)     Energy Need Method: Clemmons-St Jeor: 1251x 1.2-1.3=1501-1626     Weight Used For Protein Calculations: 63.9 kg (140 lb 14 oz)   1.0-1.5 gm/kg/day: 64-96     Fluid Need Method: RDA Method (6346-5979 mls/day or per MD)     CHO Requirement: n/a      Nutrition Prescription Ordered    Current Diet Order: adult regular  Nutrition Order Comments: Pt notes she is getting menu selections.     Evaluation of Received Nutrient/Fluid Intake    Energy Calories Required: not meeting needs  Protein Required: not meeting needs   Fluid Required: meets needs    Intake/Output Summary (Last 24 hours) at 07/05/18 1527  Last data filed at 07/05/18 0700   Gross per 24 hour   Intake             1800 ml   Output                0 ml   Net             1800 ml     % Intake of Estimated Energy Needs: 50 - 75 %  % Meal Intake: 50 - 75 % (53% average of last 7 documented meals)    Nutrition Risk    Level of Risk/Frequency of Follow-up:  (2 x wkly)     Assessment and Plan    Moderate malnutrition    Malnutrition in the context of acute illness    Related to (etiology):  Increased energy needs with decreased appetite    Signs and Symptoms (as evidenced by):  Energy Intake: <75% of estimated energy requirement for > 7 dasy (~4 months)  Body Fat Depletion: moderate depletion of upper arm, orbital   Muscle Mass Depletion: moderate depletion of slightly protrudes, and slight depression on back of hand     Interventions/Recommendations (treatment strategy):  Continue  regular diet with pt preferences.  Add ons.    Nutrition Diagnosis Status:  New                 Monitor and Evaluation    Food and Nutrient Intake: energy intake  Food and Nutrient Adminstration: diet order  Anthropometric Measurements: weight, weight change  Biochemical Data, Medical Tests and Procedures: inflammatory profile  Nutrition-Focused Physical Findings: overall appearance, skin     Nutrition Follow-Up  2 x wkly    Discharge Plan    Regular diet with ons for prn use

## 2018-07-05 NOTE — NURSING
Patient upset, insistent on surgery, doesn't understand treatment plan. Dr. Peralta explained treatment plan, but patient adamant about having a physician meeting with both Dr. Peralta and Dr. Garcia for answers of surgery or no surgery. Patient wants surgery or request to discharge immediately. Spent 30 minutes discussing needs and plan with patient. Called Dr. Garcia who is not following patient today (but patient request to call), he said he will call Dr. Peralta to have a meeting with Dr. Peralta at bedside this AM. Patient informed regarding the same. Will continue to monitor.       0830-Dr. Garcia and Dr. Begum at bedside discussing case with patient. Dr. Begum, Dr. Garcia, and Dr. Peralta to meet with the patient at 1300. Patient pleased and voices no complaints at this time

## 2018-07-05 NOTE — ASSESSMENT & PLAN NOTE
Malnutrition in the context of acute illness    Related to (etiology):  Increased energy needs with decreased appetite    Signs and Symptoms (as evidenced by):  Energy Intake: <75% of estimated energy requirement for > 7 dasy (~4 months)  Body Fat Depletion: moderate depletion of upper arm, orbital   Muscle Mass Depletion: moderate depletion of slightly protrudes, and slight depression on back of hand     Interventions/Recommendations (treatment strategy):  Continue regular diet with pt preferences.  Add ons.    Nutrition Diagnosis Status:  New

## 2018-07-05 NOTE — PLAN OF CARE
Problem: Nutrition, Imbalanced: Inadequate Oral Intake (Adult)  Goal: Improved Oral Intake  Patient will demonstrate the desired outcomes by discharge/transition of care.  Recommendations    1) continue regular diet with pt preferences 2) add Boost Plus, with breakfast  Goals: 1) Pt will consume at least 75% of meals by RD f/u.  2) Pt will use ONS during admit  Nutrition Goal Status: new  Communication of RD Recs: reviewed with RN

## 2018-07-05 NOTE — PROGRESS NOTES
07/04/18 1937   PRE-TX-O2-ETCO2   O2 Device (Oxygen Therapy) room air   SpO2 97 %   Pulse Oximetry Type Intermittent   Pulse 66

## 2018-07-05 NOTE — PHYSICIAN QUERY
PT Name: Jesi Nagy  MR #: [id number]    Physician Query Form - Consultant Diagnosis Clarification     CDS/: Matilda Bright RN             Contact information:[email or web address]  This form is a permanent document in the medical record.     Query Date: July 5, 2018      By submitting this query, we are merely seeking further clarification of documentation.  Please utilize your independent clinical judgment when addressing the question(s) below.      The Medical record contains the following:   Diagnosis Supporting Clinical Information Location in Medical Record   Moderate Malnutrition Moderate malnutrition    Malnutrition in the context of acute illness     Related to (etiology):  Increased energy needs with decreased appetite     Signs and Symptoms (as evidenced by):  Energy Intake: <75% of estimated energy requirement for > 7 dasy (~4 months)  Body Fat Depletion: moderate depletion of upper arm, orbital   Muscle Mass Depletion: moderate depletion of slightly protrudes, and slight depression on back of hand  RD Progress note         Do you agree with the Consultants diagnosis of ___Moderate Malnutrition_________________?                 [   ]   Yes               [   ]   Yes, but it resolved prior to my assessment of the patient               [   ]   No                [   ]   Clinically insignificant               [   ]   Clinically undetermined               [   ]   Other type of Malnutrition: ___________________________________________

## 2018-07-05 NOTE — PHYSICIAN QUERY
PT Name: Jesi Nagy  MR #: [id number]    Physician Query Form -Systemic Infectious Process Clarification     CDS/: Matilda Bright               Contact information:  This form is a permanent document in the medical record.     Query Date: July 5, 2018     By submitting this query, we are merely seeking further clarification of documentation. Please utilize your independent clinical judgment when addressing the question(s) below.    The Medical record contains the following:     Indicators   Supporting Clinical Findings   Location in Medical Record   X HR RR BP Temp Vital Signs (24h Range):   Temp:  [96.4 °F (35.8 °C)-100.6 °F (38.1 °C)] 98.6 °F (37 °C)   Pulse:  [72-92] 83   Resp:  [16-20] 20   SpO2:  [96 %-97 %] 97 %   BP: ()/(51-63) 98/55  Vital signs range Hospital medicine note 7/1    Lactic Acid             Procalcitonin     X WBC                Bands                     CRP WBC, 13.50  WBC 14.40  WBC 10.10 Labs 6/30  Labs 7/2  Labs 7/5   X Culture(s) Many gram positive cocci  Many gram negative rods Respiratory culture 6/30    AMS, Confusion, LOC, etc.      Organ Dysfunction / Failure     X Bacteremia or Sepsis / Septic bp low likely lingering sepsis Pulmonology  progress note 7/1   X Known or Suspected Source of Infection documented Patient with near complete opacification of the right upper lobe with severe necrotizing pneumonia and what appears to be air fluid levels within the lung.     H & P   X (Failed) Outpatient Treatment Patient with near complete opacification of the right upper lobe with severe necrotizing pneumonia and what appears to be air fluid levels within the lung.  Likely will need surgical treatment to excise this necrotizing pneumonia given the fact the patient has been on antibiotics previously without success Hospital med Progress note 7/1   X Medication Clindamycin 900mg IV every 8 hours  Zosyn 4.5gm IV every 6 hours  Vancomycin 1250 g IV every 12 hours  Prednisone  10mg PO three times daily   MAR -current    MAR 6/29-7/5  MAR 7/4, 7/5    MAR 7/2-current   X Treatment Pulmonology and cardiothoracic surgery Consulted.  Will continue patient on broad spectrum IV antibiotics for healthcare associated pneumonia with addition of anaerobes given necrotizing component.  Defer further management to pulmonology and/or CT surgery.   TB sample has been collected, pending  Sputum culture collected, will logan abx therapy accordingly  Will continue anti-inflammatories for pain, considering marginal blood pressures. Hospital med Progress note 7/1    Other       Provider, please specify diagnosis or diagnoses associated with above clinical findings.    [  ] Sepsis  [ x ] Severe Sepsis with Acute Organ Dysfunction/Failure (please specify         organ dysfunction/failure): _____lung______________  [  ] Sepsis with Septic Shock  [  ] Other Infectious Disease (please specify): _________________________________  [  ] Other: __________________________________  [  ] Clinically Undetermined    Please document in your progress notes daily for the duration of treatment until resolved and include in your discharge summary.

## 2018-07-05 NOTE — PLAN OF CARE
Problem: Patient Care Overview  Goal: Plan of Care Review  POC reviewed with pt, pt verbalized understanding. Safety maintained throughout shift, bed locked and in lowest position, call light in reach, Side rails up X 2. Non skid sock on when OOB. Pt remained free of fall/trauma. Pt self reports of pain treated with PO pain medication as ordered.  VSS, pt remained afebrile this shift. Pt tolerating meals wells, no reports of nausea and vomiting. IV abx  infused as ordered. Telemetry placed, NSR. Hibiclens bath done this shift. PICC to right upper arm, clean dry and intact, blood return noted.  Pt to be NPO at midnight, pending surgery in the AM. Will continue to monitor.

## 2018-07-05 NOTE — PLAN OF CARE
Problem: Patient Care Overview  Goal: Plan of Care Review  Outcome: Ongoing (interventions implemented as appropriate)  Patient AAO, VSS.  IV abx infusing as ordered.  Pain well managed with scheduled pain medications. Tele monitored; sinus rhythm.  No complaints of N/V.  Neuro checks done q4hr.  Hibiclens bath taken.  Pt verbalized understanding of POC. Q2hr rounding done during shift to promote patient safety. Patient free from falls and injury during shift.  Bed in lowest position, brakes locked, and call light within reach.  Will continue to monitor.

## 2018-07-05 NOTE — PLAN OF CARE
Problem: Patient Care Overview  Goal: Plan of Care Review  Outcome: Ongoing (interventions implemented as appropriate)  Plan of care reviewed with patient. Pt verbalized understanding. Pt AAO x 4. VSS/NAD noted. PRN pain meds given per orders. IV antibiotics infusing as ordered. No c/o nausea/vomiting. Pt ambulating to bathroom independently. SR up x 2. Bed in low position. Call light in reach. Will continue to monitor.

## 2018-07-05 NOTE — PROGRESS NOTES
Progress Note  PULMONARY    Admit Date: 6/29/2018 07/05/2018      SUBJECTIVE:     June 30, still poor appetite, ambulated in room, cough less and sl better  Ju;y 1, still poor appetite, ambulates room, violent cough with dark mucous.  July 2, miserable still violent cough with nasty foul mocous 1/4 tsp , old blood present. Irate somewhat. Appetite poor attributes to nasty taste  July 3, less cough and pain better, bp better , feels sl better, eating but poor appetite, no hemoptysis but still nasty tasting mucous.    July 5, still cough and feels poorly , appetite marginal but no hemoptysis.      PFSH and Allergies reviewed.    OBJECTIVE:     Vitals (Most recent):  Vitals:    07/05/18 0313   BP: (!) 162/85   Pulse: 71   Resp: 17   Temp: 98.1 °F (36.7 °C)       Vitals (24 hour range):  Temp:  [96.6 °F (35.9 °C)-98.1 °F (36.7 °C)]   Pulse:  [63-71]   Resp:  [16-17]   BP: (136-177)/()   SpO2:  [93 %-99 %]       Intake/Output Summary (Last 24 hours) at 07/05/18 0736  Last data filed at 07/04/18 1847   Gross per 24 hour   Intake             1590 ml   Output                0 ml   Net             1590 ml          Physical Exam:  The patient's neuro status (alertness,orientation,cognitive function,motor skills,), pharyngeal exam (oral lesions, hygiene, abn dentition,), Neck (jvd,mass,thyroid,nodes in neck and above/below clavicle),RESPIRATORY(symmetry,effort,fremitus,percussion,auscultation),  Cor(rhythm,heart tones including gallops,perfusion,edema)ABD(distention,hepatic&splenomegaly,tenderness,masses), Skin(rash,cyanosis),Psyc(affect,judgement,).  Exam negative except for these pertinent findings:    Alert,no distress , chest is symmetric, no distress, normal percussion, normal fremitus and good normal breath sounds  No edeam     Radiographs reviewed: view by direct vision - July 1, sl better cxr       Labs       Recent Labs  Lab 07/05/18  0509   WBC 10.10   HGB 10.7*   HCT 31.6*   *       Recent Labs  Lab  07/05/18  0509      K 3.9      CO2 24   BUN 8   CREATININE 0.6   *   CALCIUM 8.5*   MG 1.8   PHOS 3.7   AST 38   ALT 38   ALKPHOS 61   BILITOT 0.1   PROT 5.6*   ALBUMIN 1.8*   No results for input(s): PH, PCO2, PO2, HCO3 in the last 24 hours.  Microbiology Results (last 7 days)     Procedure Component Value Units Date/Time    Blood culture (site 1) [[id number]] Collected:  06/29/18 1325    Order Status:  Completed Specimen:  Blood Updated:  07/04/18 2212     Blood Culture, Routine No growth after 5 days.    Narrative:       Site # 1, aerobic and anaerobic    Blood culture (site 2) [[id number]] Collected:  06/29/18 1328    Order Status:  Completed Specimen:  Blood from Antecubital, Left  Arm Updated:  07/04/18 2212     Blood Culture, Routine No growth after 5 days.    Narrative:       Site # 2, aerobic only    AFB Culture & Smear [[id number]] Collected:  07/02/18 0451    Order Status:  Completed Specimen:  Respiratory from Sputum, Expectorated Updated:  07/03/18 2127     AFB Culture & Smear Culture in progress     AFB CULTURE STAIN No acid fast bacilli seen.    Culture, Respiratory with Gram Stain [[id number]] Collected:  06/30/18 0700    Order Status:  Completed Specimen:  Respiratory from Sputum, Expectorated Updated:  07/02/18 1252     Respiratory Culture Normal respiratory alka     Gram Stain (Respiratory) <10 epithelial cells per low power field.     Gram Stain (Respiratory) No WBC's     Gram Stain (Respiratory) Many Gram positive cocci     Gram Stain (Respiratory) Many Gram negative rods    AFB Culture & Smear [[id number]] Collected:  06/30/18 0700    Order Status:  Completed Specimen:  Respiratory from Sputum, Expectorated Updated:  07/01/18 2127     AFB Culture & Smear Culture in progress     AFB CULTURE STAIN No acid fast bacilli seen.    AFB Culture & Smear [[id number]] Collected:  06/30/18 0700    Order Status:  Canceled Specimen:  Respiratory from Sputum, Expectorated Updated:  06/30/18 0730           Impression:  Active Hospital Problems    Diagnosis  POA    *Necrotizing pneumonia [J85.0]  Yes    Hypotension [I95.9]  No    Hypokalemia [E87.6]  No    Hemoptysis [R04.2]  Yes    Abscess of right lung with pneumonia [J85.1]  Yes    Caries [K02.9]  Yes      Resolved Hospital Problems    Diagnosis Date Resolved POA   No resolved problems to display.               Plan:     June 30, gpc and gnr in sputum, discussed with Dr villeda and harriet.  Good chance will clear with abx alone. No rush to surgery.    July 1, reviewed cxr - lung abscess are in both rul and rll, no large cavities but lots of sm cavities- suspect will be slow to control with abx.  Pt having problems with violent coughing and mobilizes some purulent mucous - abscesses are not draining well.  bp low likely lingering sepsis.  MAP was 60 with smp 75 this am.  Discussed with Dr Almaraz.  Consider Chetta monitoring and more fluid boluses, will oreder low dose steroids and midodrine.  F/u cxr.  May need icu.  Hoping medical rx will work.  Sputum pending.    July 2, no staph from mucous- drop vanc soon- increase prednisone 10 tid,  norco schedule may help.   May need surg if no improvement soon?  Continue rx.  July 3 pt is responding to abx, she is better.  Surgery not unreasonable but chance she may clear without is good.  Discussed with pt - would recommend waiting a few more days prior to considering surgery.  July 5, discussed with Dr Garcia, Dr Salazar's note noted.     Pt seems to be responding, would not recommend surgery at this time as may get by without surgery.  Pt uneasy going to surgery and not going to surgery.  Will met with pt and Dr Garcia later today.                          .

## 2018-07-06 VITALS
WEIGHT: 140.88 LBS | OXYGEN SATURATION: 99 % | RESPIRATION RATE: 18 BRPM | HEIGHT: 66 IN | HEART RATE: 86 BPM | DIASTOLIC BLOOD PRESSURE: 92 MMHG | BODY MASS INDEX: 22.64 KG/M2 | SYSTOLIC BLOOD PRESSURE: 136 MMHG | TEMPERATURE: 97 F

## 2018-07-06 LAB
ALBUMIN SERPL BCP-MCNC: 2.1 G/DL
ALP SERPL-CCNC: 63 U/L
ALT SERPL W/O P-5'-P-CCNC: 69 U/L
ANION GAP SERPL CALC-SCNC: 7 MMOL/L
AST SERPL-CCNC: 67 U/L
BASOPHILS # BLD AUTO: 0.1 K/UL
BASOPHILS NFR BLD: 0.8 %
BILIRUB SERPL-MCNC: 0.1 MG/DL
BUN SERPL-MCNC: 10 MG/DL
CALCIUM SERPL-MCNC: 8.7 MG/DL
CHLORIDE SERPL-SCNC: 107 MMOL/L
CO2 SERPL-SCNC: 24 MMOL/L
CREAT SERPL-MCNC: 0.6 MG/DL
DIFFERENTIAL METHOD: ABNORMAL
EOSINOPHIL # BLD AUTO: 0 K/UL
EOSINOPHIL NFR BLD: 0.1 %
ERYTHROCYTE [DISTWIDTH] IN BLOOD BY AUTOMATED COUNT: 16.9 %
EST. GFR  (AFRICAN AMERICAN): >60 ML/MIN/1.73 M^2
EST. GFR  (NON AFRICAN AMERICAN): >60 ML/MIN/1.73 M^2
GLUCOSE SERPL-MCNC: 113 MG/DL
HCT VFR BLD AUTO: 32.2 %
HGB BLD-MCNC: 10.7 G/DL
LYMPHOCYTES # BLD AUTO: 1.5 K/UL
LYMPHOCYTES NFR BLD: 14.5 %
MAGNESIUM SERPL-MCNC: 2.1 MG/DL
MCH RBC QN AUTO: 33.2 PG
MCHC RBC AUTO-ENTMCNC: 33.3 G/DL
MCV RBC AUTO: 100 FL
MONOCYTES # BLD AUTO: 0.2 K/UL
MONOCYTES NFR BLD: 1.5 %
NEUTROPHILS # BLD AUTO: 8.6 K/UL
NEUTROPHILS NFR BLD: 83.1 %
PHOSPHATE SERPL-MCNC: 3.8 MG/DL
PLATELET # BLD AUTO: 483 K/UL
PMV BLD AUTO: 7.9 FL
POTASSIUM SERPL-SCNC: 4.4 MMOL/L
PROT SERPL-MCNC: 5.9 G/DL
RBC # BLD AUTO: 3.23 M/UL
SODIUM SERPL-SCNC: 138 MMOL/L
WBC # BLD AUTO: 10.3 K/UL

## 2018-07-06 PROCEDURE — 94761 N-INVAS EAR/PLS OXIMETRY MLT: CPT

## 2018-07-06 PROCEDURE — 84100 ASSAY OF PHOSPHORUS: CPT

## 2018-07-06 PROCEDURE — 83735 ASSAY OF MAGNESIUM: CPT

## 2018-07-06 PROCEDURE — S0077 INJECTION, CLINDAMYCIN PHOSP: HCPCS | Performed by: HOSPITALIST

## 2018-07-06 PROCEDURE — 63600175 PHARM REV CODE 636 W HCPCS: Performed by: INTERNAL MEDICINE

## 2018-07-06 PROCEDURE — 25000003 PHARM REV CODE 250: Performed by: INTERNAL MEDICINE

## 2018-07-06 PROCEDURE — A4216 STERILE WATER/SALINE, 10 ML: HCPCS | Performed by: HOSPITALIST

## 2018-07-06 PROCEDURE — 85025 COMPLETE CBC W/AUTO DIFF WBC: CPT

## 2018-07-06 PROCEDURE — 25000003 PHARM REV CODE 250: Performed by: HOSPITALIST

## 2018-07-06 PROCEDURE — 97803 MED NUTRITION INDIV SUBSEQ: CPT

## 2018-07-06 PROCEDURE — 25000003 PHARM REV CODE 250: Performed by: NURSE PRACTITIONER

## 2018-07-06 PROCEDURE — 80053 COMPREHEN METABOLIC PANEL: CPT

## 2018-07-06 RX ORDER — ZOLPIDEM TARTRATE 5 MG/1
5 TABLET ORAL NIGHTLY PRN
Qty: 20 TABLET | Refills: 0 | Status: SHIPPED | OUTPATIENT
Start: 2018-07-06 | End: 2018-07-10 | Stop reason: SDUPTHER

## 2018-07-06 RX ORDER — HYDROCODONE POLISTIREX AND CHLORPHENIRAMINE POLISTIREX 10; 8 MG/5ML; MG/5ML
5 SUSPENSION, EXTENDED RELEASE ORAL EVERY 12 HOURS
Qty: 115 ML | Refills: 0 | Status: SHIPPED | OUTPATIENT
Start: 2018-07-06 | End: 2018-10-03

## 2018-07-06 RX ORDER — HYDROCODONE BITARTRATE AND ACETAMINOPHEN 5; 325 MG/1; MG/1
1 TABLET ORAL EVERY 6 HOURS
Qty: 20 TABLET | Refills: 0 | Status: SHIPPED | OUTPATIENT
Start: 2018-07-06 | End: 2018-07-20

## 2018-07-06 RX ORDER — HYDRALAZINE HYDROCHLORIDE 25 MG/1
25 TABLET, FILM COATED ORAL EVERY 6 HOURS PRN
Status: DISCONTINUED | OUTPATIENT
Start: 2018-07-06 | End: 2018-07-06 | Stop reason: HOSPADM

## 2018-07-06 RX ORDER — CLINDAMYCIN HYDROCHLORIDE 300 MG/1
300 CAPSULE ORAL 3 TIMES DAILY
Qty: 42 CAPSULE | Refills: 0 | Status: SHIPPED | OUTPATIENT
Start: 2018-07-06 | End: 2018-07-20

## 2018-07-06 RX ORDER — IBUPROFEN 600 MG/1
600 TABLET ORAL 4 TIMES DAILY
Qty: 30 TABLET | Refills: 0 | Status: SHIPPED | OUTPATIENT
Start: 2018-07-06 | End: 2018-07-20

## 2018-07-06 RX ADMIN — HYDRALAZINE HYDROCHLORIDE 25 MG: 25 TABLET, FILM COATED ORAL at 07:07

## 2018-07-06 RX ADMIN — HYDROCODONE BITARTRATE AND ACETAMINOPHEN 1 TABLET: 5; 325 TABLET ORAL at 06:07

## 2018-07-06 RX ADMIN — SODIUM CHLORIDE, PRESERVATIVE FREE 10 ML: 5 INJECTION INTRAVENOUS at 06:07

## 2018-07-06 RX ADMIN — SODIUM CHLORIDE, PRESERVATIVE FREE 10 ML: 5 INJECTION INTRAVENOUS at 12:07

## 2018-07-06 RX ADMIN — HYDROCODONE POLISTIREX AND CHLORPHENIRAMINE POLISITREX 5 ML: 10; 8 SUSPENSION, EXTENDED RELEASE ORAL at 08:07

## 2018-07-06 RX ADMIN — IBUPROFEN 600 MG: 600 TABLET ORAL at 12:07

## 2018-07-06 RX ADMIN — HYDROCODONE BITARTRATE AND ACETAMINOPHEN 1 TABLET: 5; 325 TABLET ORAL at 12:07

## 2018-07-06 RX ADMIN — PREDNISONE 10 MG: 5 TABLET ORAL at 08:07

## 2018-07-06 RX ADMIN — CLINDAMYCIN IN 5 PERCENT DEXTROSE 900 MG: 18 INJECTION, SOLUTION INTRAVENOUS at 06:07

## 2018-07-06 RX ADMIN — IBUPROFEN 600 MG: 600 TABLET ORAL at 08:07

## 2018-07-06 NOTE — PROGRESS NOTES
Progress Note  Pulmonary/Critical Care Medicine    Admit Date: 6/29/2018    SUBJECTIVE:     Follow-up For:  Necrotizing pneumonia    Review of Systems:  Constitutional: no fever or chills  Respiratory: positive some cough.  Cardiovascular: no chest pain or palpitations  Gastrointestinal: no nausea or vomiting, no abdominal pain or change in bowel habits    OBJECTIVE:     Vital Signs (Most Recent)  Temp: 98.3 °F (36.8 °C) (07/06/18 0754)  Pulse: 76 (07/06/18 0754)  Resp: 18 (07/06/18 0754)  BP: (!) 180/91 (07/06/18 0754)  SpO2: 98 % (07/06/18 0754)    I & O (Last 24H):  Intake/Output Summary (Last 24 hours) at 07/06/18 0806  Last data filed at 07/05/18 1800   Gross per 24 hour   Intake              790 ml   Output                0 ml   Net              790 ml       Physical Exam:  Constitutional: She is oriented to person, place, and time. She appears well-developed and well-nourished.    Head: Normocephalic and atraumatic.   Eyes: pupils are equally reactive and responsive to light. No scleral icterus.   Neck: Neck supple. No JVD present.   Cardiovascular: Normal rate and regular rhythm.  Exam reveals no gallop and no friction rub.  No murmur heard.  Pulmonary/Chest: the lungs are clear to auscultation and percussion. Lung excursion is symmetrical. No increased work of breathing is noted.   Abdominal: Soft. Bowel sounds are normal. She exhibits no distension. There is no tenderness.   Musculoskeletal: She exhibits no edema or tenderness.   Lymphadenopathy:     She has no cervical adenopathy.   Neurological: She is alert and oriented to person, place, and time. Motor strength is 5 over 5 bilaterally.  Skin: No rash noted. No erythema.   Psychiatric: She has a normal mood and affect.       Laboratory:  CBC:    Recent Labs  Lab 07/06/18 0425   WBC 10.30   RBC 3.23*   HGB 10.7*   HCT 32.2*   *   *   MCH 33.2*   MCHC 33.3     CMP    Recent Labs  Lab 07/06/18 0425   CALCIUM 8.7   ALBUMIN 2.1*   PROT 5.9*       K 4.4   CO2 24      BUN 10   CREATININE 0.6   ALKPHOS 63   ALT 69*   AST 67*   BILITOT 0.1     AFB- no AFB on stain  Sputum normal alka  Blood-no growth           Diagnostic Results:  Labs: Reviewed  X-Ray: Reviewed    ASSESSMENT/PLAN:   Necrotizing pneumonia  Abscesses to RUL and RLL, improving    No objection to discharge home on continued antibiotics  Patient will need close follow-up with Dr. Peralta

## 2018-07-06 NOTE — ASSESSMENT & PLAN NOTE
Initial thought was for surgical treatment, however patient has improved on IV antibiotics.  Will switch patient over to clindamycin today and discharge home on clindamycin with weekly chest x-rays and follow-up the escalation to Augmentin for appropriate coverage for intrapulmonary pathogens.  If patient does not improve, then would consider surgery at that point in time

## 2018-07-06 NOTE — ASSESSMENT & PLAN NOTE
Malnutrition in the context of acute illness    Related to (etiology):  Increased energy needs with decreased appetite    Signs and Symptoms (as evidenced by):  Energy Intake: <75% of estimated energy requirement for > 7 dasy (~4 months)  Body Fat Depletion: moderate depletion of upper arm, orbital   Muscle Mass Depletion: moderate depletion of slightly protrudes, and slight depression on back of hand     Interventions/Recommendations (treatment strategy):  Continue regular diet with pt preferences.  Add ons.    Nutrition Diagnosis Status:  improving

## 2018-07-06 NOTE — PLAN OF CARE
Problem: Nutrition, Imbalanced: Inadequate Oral Intake (Adult)  Goal: Improved Oral Intake  Patient will demonstrate the desired outcomes by discharge/transition of care.   Recommendations    1) continue regular diet with pt preferences 2) continue Boost Plus, with breakfast  Goals: 1) Pt will consume at least 75% of meals by RD f/u.  2) Pt will use ONS during admit  Nutrition Goal Status: 1) met/ongoing 2) new  Communication of RD Recs: poc

## 2018-07-06 NOTE — DISCHARGE INSTRUCTIONS
Thank you for choosing [facility] for your medical care. The primary doctor who is taking care of you at the time of your discharge is Olga Avila MD.     You were admitted to the hospital with Necrotizing pneumonia.     Please note your discharge instructions, including diet/activity restrictions, follow-up appointments, and medication changes.  If you have any questions about your medical issues, prescriptions, or any other questions, please feel free to contact the [facility] Medicine Dept at [phone] and we will help.    If you are previously with Home health, outpatient PT/OT or under a therapy program, you are cleared to return to those programs.    Please direct all long term medication refills and follow up to your primary care provider, Suman Ortega MD. Thank you again for letting us take care of your health care needs.

## 2018-07-06 NOTE — PROGRESS NOTES
"  [facility]  Adult Nutrition  Consult Note    SUMMARY     Recommendations    1) continue regular diet with pt preferences 2) continue Boost Plus, with breakfast  Goals: 1) Pt will consume at least 75% of meals by RD f/u.  2) Pt will use ONS during admit  Nutrition Goal Status: 1) met/ongoing 2) new  Communication of RD Recs: poc    Reason for Assessment    Reason for Assessment: RD follow up  1. Hemoptysis    2. Necrotizing pneumonia    3. Abscess of right lung with pneumonia, unspecified part of lung      History reviewed. No pertinent past medical history.  Interdisciplinary Rounds: attended  General Information Comments: Admitted with SOB.  Reports poor appetite ~4 months.  Notes  lbs and that she has not noticed wt loss.  Minimal wt hx in chart.  NFPE completed.  Moderate fat and muscle loss.   18: PO intake improved    Nutrition Risk Screen    Nutrition Risk Screen: no indicators present    Nutrition/Diet History    Patient Reported Diet/Restrictions/Preferences: general  Food Preferences: No intolerances noted.  Does not use ONS at home. willing to try Boost Plus, chocolate  Do you have any cultural, spiritual, Confucianist conflicts, given your current situation?: none  Food Allergies: NKFA    Anthropometrics    Temp: 97 °F (36.1 °C)  Height Method: Stated  Height: 5' 6"  Height (inches): 66 in  Weight Method: Standard Scale  Weight: 63.9 kg (140 lb 14 oz)  Weight (lb): 140.88 lb  Ideal Body Weight (IBW), Female: 130 lb  % Ideal Body Weight, Female (lb): 108.37 lb  BMI (Calculated): 22.8  BMI Grade: 18.5-24.9 - normal  Usual Body Weight (UBW), k.3 kg (per chart review 18)  % Usual Body Weight: 102.78  % Weight Change From Usual Weight: 2.57 %       Lab/Procedures/Meds    Pertinent Labs Reviewed: reviewed  Lab Results   Component Value Date    ALBUMIN 2.1 (L) 2018     Lab Results   Component Value Date    .7 (H) 2018     Pertinent Medications Reviewed: " reviewed  Scheduled Meds:   clindamycin (CLEOCIN) IVPB  900 mg Intravenous Q8H    HYDROcodone-acetaminophen  1 tablet Oral Q6H    hydrocodone-chlorpheniramine  5 mL Oral Q12H    ibuprofen  600 mg Oral QID    predniSONE  10 mg Oral TID    ramelteon  8 mg Oral QHS    sodium chloride 0.9%  10 mL Intravenous Q6H     Continuous Infusions:    Physical Findings/Assessment    Oral/Mouth Cavity: decay/carries  Skin:  (Leander score 21)    Estimated/Assessed Needs    Weight Used For Calorie Calculations: 63.9 kg (140 lb 14 oz)     Energy Need Method: Hidalgo-St Jeor: 1251x 1.2-1.3=1501-1626     Weight Used For Protein Calculations: 63.9 kg (140 lb 14 oz)   1.0-1.5 gm/kg/day: 64-96     Fluid Need Method: RDA Method (9731-8413 mls/day or per MD)     CHO Requirement: n/a      Nutrition Prescription Ordered    Current Diet Order: adult regular  Nutrition Order Comments: Pt notes she is getting menu selections.     Evaluation of Received Nutrient/Fluid Intake    Energy Calories Required: not meeting needs  Protein Required: not meeting needs   Fluid Required: meets needs    Intake/Output Summary (Last 24 hours) at 07/06/18 1309  Last data filed at 07/06/18 0604   Gross per 24 hour   Intake             1190 ml   Output                0 ml   Net             1190 ml     % Intake of Estimated Energy Needs: %  % Meal Intake: %  (75% average of last 5 documented meals)    Nutrition Risk    Level of Risk/Frequency of Follow-up:  (1 x wkly)     Assessment and Plan    Moderate malnutrition    Malnutrition in the context of acute illness    Related to (etiology):  Increased energy needs with decreased appetite    Signs and Symptoms (as evidenced by):  Energy Intake: <75% of estimated energy requirement for > 7 dasy (~4 months)  Body Fat Depletion: moderate depletion of upper arm, orbital   Muscle Mass Depletion: moderate depletion of slightly protrudes, and slight depression on back of hand      Interventions/Recommendations (treatment strategy):  Continue regular diet with pt preferences.  Add ons.    Nutrition Diagnosis Status:  improving                 Monitor and Evaluation    Food and Nutrient Intake: energy intake  Food and Nutrient Adminstration: diet order  Anthropometric Measurements: weight, weight change  Biochemical Data, Medical Tests and Procedures: inflammatory profile  Nutrition-Focused Physical Findings: overall appearance, skin     Nutrition Follow-Up  1 x wkly    Discharge Plan    Regular diet with ons for prn use  RD Follow-up?: Yes

## 2018-07-06 NOTE — NURSING
Discharge instructions given to patient, verbalized understanding. PICC removed. Intact, tolerated well. Educated patient on medications. Left floor via wheelchair with spouse at side. Remained free from fall and injury. Vitals stable.

## 2018-07-06 NOTE — HOSPITAL COURSE
Patient is a 57-year-old  female with a past medical history significant for hypertension who presents the hospital with what appeared to be a poorly controlled necrotizing pneumonia failing outpatient management.  Patient was initially started on broad-spectrum IV antibiotics and CT surgery and pulmonology were consulted.  Patient was continued on antibiotics for approximately 7 days in the hospital and complete the course without difficulty.  There was initial concern that the patient may need a lobectomy secondary to the extent of her pneumonia, however she responded well with IV antibiotics.  She did have some intermittent periods of hypotension and fever, but after approximately 5 days had no further episodes of hypotension and fever and the patient clinically was much improved.      It was the recommendation of a CT surgery and pulmonology the patient try a course of outpatient oral antibiotics for approximately 4 weeks prior to attempting lobectomy to see if the antibiotics could salvage the patient's pneumonia.  Patient did have some significant hemoptysis throughout the course of her hospitalization which had improved somewhat prior to discharge. She will be discharged home on oral antibiotics with clindamycin as well as weekly chest x-rays for the next 4 weeks, as well as cough/fever/pain/sleep medication is p.r.n..  She was instructed return to the hospital if she experience worsening cough chest pain or fever.  Patient was seen on the day of discharge and deemed medically stable for discharge.

## 2018-07-06 NOTE — ASSESSMENT & PLAN NOTE
Patient has hypokalemia which is currently controlled. Last electrolytes reviewed-     Recent Labs  Lab 07/04/18  0428 07/05/18  0509   K 3.9 3.9   . Will replace potassium and monitor electrolytes closely.

## 2018-07-06 NOTE — SUBJECTIVE & OBJECTIVE
Interval History: Patient seen and examined.  Multiple long conversations with the patient as well as a conference with the entire medical team including, Dr. Peralta, Dr. Begum and myself at the patient's bedside.  Patient still having cough with dark sputum and chest pain, however remains afebrile with no further evidence of hypotension or other symptoms.  Discussed with the patient in detail in regards to antibiotic therapy versus surgical treatment.     Review of Systems   Constitutional: Positive for activity change, appetite change and fatigue.   Respiratory: Positive for cough and shortness of breath.    Cardiovascular: Negative for chest pain and leg swelling.   Gastrointestinal: Negative for abdominal pain and nausea.   Neurological: Negative for weakness.   Psychiatric/Behavioral: Negative for confusion.   All other systems reviewed and are negative.    Objective:     Vital Signs (Most Recent):  Temp: 98.1 °F (36.7 °C) (07/05/18 1518)  Pulse: 74 (07/05/18 1518)  Resp: 18 (07/05/18 1518)  BP: (!) 141/80 (07/05/18 1518)  SpO2: 95 % (07/05/18 2000) Vital Signs (24h Range):  Temp:  [97.1 °F (36.2 °C)-98.1 °F (36.7 °C)] 98.1 °F (36.7 °C)  Pulse:  [] 74  Resp:  [16-18] 18  SpO2:  [95 %-98 %] 95 %  BP: (115-174)/(62-98) 141/80     Weight: 63.9 kg (140 lb 14 oz)  Body mass index is 22.74 kg/m².    Intake/Output Summary (Last 24 hours) at 07/05/18 2155  Last data filed at 07/05/18 1800   Gross per 24 hour   Intake             1540 ml   Output                0 ml   Net             1540 ml      Physical Exam   Constitutional: She is oriented to person, place, and time. She appears well-developed and well-nourished.   HENT:   Head: Normocephalic and atraumatic.   Eyes: Right eye exhibits no discharge. Left eye exhibits no discharge. No scleral icterus.   Cardiovascular: Normal rate and regular rhythm.  Exam reveals no gallop and no friction rub.    No murmur heard.  Pulmonary/Chest: Effort normal. She has no  wheezes. She has no rales.   Decreased breath sounds noted at the right upper lung field.  No increased work of breathing is noted. Chest wall tenderness noted on palpation particularly of the right side.   Abdominal: Soft. Bowel sounds are normal. She exhibits no distension. There is no tenderness.   Musculoskeletal: She exhibits no edema or tenderness.   Neurological: She is alert and oriented to person, place, and time. She has normal reflexes. No cranial nerve deficit.   Skin: No rash noted. No erythema.   Psychiatric: She has a normal mood and affect.   Nursing note and vitals reviewed.      Significant Labs: All pertinent labs within the past 24 hours have been reviewed.    Significant Imaging: I have reviewed all pertinent imaging results/findings within the past 24 hours.

## 2018-07-06 NOTE — ASSESSMENT & PLAN NOTE
Nutrition consulted. Body mass index is 22.74 kg/m².. Encourage maximal PO intake. Diet supplementation ordered per nutrition approval. Will encourage PO and monitor closely for weight changes.

## 2018-07-06 NOTE — PLAN OF CARE
07/06/18 1218   PRE-TX-O2-ETCO2   O2 Device (Oxygen Therapy) room air   SpO2 98 %   Pulse Oximetry Type Intermittent   $ Pulse Oximetry - Multiple Charge Pulse Oximetry - Multiple

## 2018-07-06 NOTE — PROGRESS NOTES
[facility] Hospital Medicine  Progress Note    Patient Name: Jesi Nagy  MRN: [id number]  Patient Class: IP- Inpatient   Admission Date: 6/29/2018  Length of Stay: 6 days  Attending Physician: Olga Avila MD  Primary Care Provider: Primary Doctor No        Subjective:     Principal Problem:Necrotizing pneumonia    HPI:  Patient is a 57-year-old  female with a past medical history significant for anxiety who presents to the hospital after having being referred from the clinic.  Patient had upper respiratory symptoms for approximately 6 months prior to admission.  Showed intermittent periods of cough, bloody sputum, and fever up to 102.  Denies any night sweats or weight loss.  Each time, the patient had presented to urgent care and started on antibiotics for short course in which she transiently improved but then relapsed again approximately 1-2 weeks later.  Patient has been through per her own description at least 5 courses of oral antibiotics and has continued to have intermittent spiking fevers in between regimens of antibiotics.  She sought care in the Primary Care Clinic and was initially found to have an abnormality on chest x-ray and markedly elevated inflammatory markers which prompted further evaluation by CT scan of the chest.  On CT scan, the patient was found to have a right upper lobe necrotizing pneumonia.  Patient states she continues to have sharp chest pain which is exacerbated with activity and deep breathing.  She does also admit to intermittent fevers the last of which was 2 days ago.  Patient denies any nausea, vomiting, diarrhea, or other abdominal complaints.    Hospital Course:  No notes on file    Interval History: Patient seen and examined.  Multiple long conversations with the patient as well as a conference with the entire medical team including, Dr. Peralta, Dr. Begum and myself at the patient's bedside.  Patient still having cough with dark sputum and  chest pain, however remains afebrile with no further evidence of hypotension or other symptoms.  Discussed with the patient in detail in regards to antibiotic therapy versus surgical treatment.     Review of Systems   Constitutional: Positive for activity change, appetite change and fatigue.   Respiratory: Positive for cough and shortness of breath.    Cardiovascular: Negative for chest pain and leg swelling.   Gastrointestinal: Negative for abdominal pain and nausea.   Neurological: Negative for weakness.   Psychiatric/Behavioral: Negative for confusion.   All other systems reviewed and are negative.    Objective:     Vital Signs (Most Recent):  Temp: 98.1 °F (36.7 °C) (07/05/18 1518)  Pulse: 74 (07/05/18 1518)  Resp: 18 (07/05/18 1518)  BP: (!) 141/80 (07/05/18 1518)  SpO2: 95 % (07/05/18 2000) Vital Signs (24h Range):  Temp:  [97.1 °F (36.2 °C)-98.1 °F (36.7 °C)] 98.1 °F (36.7 °C)  Pulse:  [] 74  Resp:  [16-18] 18  SpO2:  [95 %-98 %] 95 %  BP: (115-174)/(62-98) 141/80     Weight: 63.9 kg (140 lb 14 oz)  Body mass index is 22.74 kg/m².    Intake/Output Summary (Last 24 hours) at 07/05/18 2155  Last data filed at 07/05/18 1800   Gross per 24 hour   Intake             1540 ml   Output                0 ml   Net             1540 ml      Physical Exam   Constitutional: She is oriented to person, place, and time. She appears well-developed and well-nourished.   HENT:   Head: Normocephalic and atraumatic.   Eyes: Right eye exhibits no discharge. Left eye exhibits no discharge. No scleral icterus.   Cardiovascular: Normal rate and regular rhythm.  Exam reveals no gallop and no friction rub.    No murmur heard.  Pulmonary/Chest: Effort normal. She has no wheezes. She has no rales.   Decreased breath sounds noted at the right upper lung field.  No increased work of breathing is noted. Chest wall tenderness noted on palpation particularly of the right side.   Abdominal: Soft. Bowel sounds are normal. She exhibits no  distension. There is no tenderness.   Musculoskeletal: She exhibits no edema or tenderness.   Neurological: She is alert and oriented to person, place, and time. She has normal reflexes. No cranial nerve deficit.   Skin: No rash noted. No erythema.   Psychiatric: She has a normal mood and affect.   Nursing note and vitals reviewed.      Significant Labs: All pertinent labs within the past 24 hours have been reviewed.    Significant Imaging: I have reviewed all pertinent imaging results/findings within the past 24 hours.    Assessment/Plan:      * Necrotizing pneumonia    Initial thought was for surgical treatment, however patient has improved on IV antibiotics.  Will switch patient over to clindamycin today and discharge home on clindamycin with weekly chest x-rays and follow-up the escalation to Augmentin for appropriate coverage for intrapulmonary pathogens.  If patient does not improve, then would consider surgery at that point in time          Abscess of right lung with pneumonia    Treatment per above          Hemoptysis    Etiology likely related to severe pneumonia.  Treatment per above.  Malignancy unlikely, however not completely ruled out at this point.  No reported hemoptysis this admission, but did have prior to admission          Moderate malnutrition    Nutrition consulted. Body mass index is 22.74 kg/m².. Encourage maximal PO intake. Diet supplementation ordered per nutrition approval. Will encourage PO and monitor closely for weight changes.            Hypokalemia    Patient has hypokalemia which is currently controlled. Last electrolytes reviewed-     Recent Labs  Lab 07/04/18  0428 07/05/18  0509   K 3.9 3.9   . Will replace potassium and monitor electrolytes closely.             Caries    Possibly contributing to infection, will f/u blood cultures            VTE Risk Mitigation         Ordered     IP VTE LOW RISK PATIENT  Once      06/29/18 2386              Melvin Garcia MD  Department of  Hospital Medicine [facility]

## 2018-07-07 NOTE — DISCHARGE SUMMARY
[phone] Miguel Beasley advised and verbalized understanding. She states she will return call to schedule in April. [facility] Hospital Medicine  Discharge Summary      Patient Name: Jeis Nagy  MRN: [id number]  Admission Date: 6/29/2018  Hospital Length of Stay: 7 days  Discharge Date and Time: 7/6/2018  1:55 PM  Attending Physician: Ana att. providers found   Discharging Provider: Melvin Garcia MD  Primary Care Provider: Suman Ortega MD      HPI:   Patient is a 57-year-old  female with a past medical history significant for anxiety who presents to the hospital after having being referred from the clinic.  Patient had upper respiratory symptoms for approximately 6 months prior to admission.  Showed intermittent periods of cough, bloody sputum, and fever up to 102.  Denies any night sweats or weight loss.  Each time, the patient had presented to urgent care and started on antibiotics for short course in which she transiently improved but then relapsed again approximately 1-2 weeks later.  Patient has been through per her own description at least 5 courses of oral antibiotics and has continued to have intermittent spiking fevers in between regimens of antibiotics.  She sought care in the Primary Care Clinic and was initially found to have an abnormality on chest x-ray and markedly elevated inflammatory markers which prompted further evaluation by CT scan of the chest.  On CT scan, the patient was found to have a right upper lobe necrotizing pneumonia.  Patient states she continues to have sharp chest pain which is exacerbated with activity and deep breathing.  She does also admit to intermittent fevers the last of which was 2 days ago.  Patient denies any nausea, vomiting, diarrhea, or other abdominal complaints.    * No surgery found *      Hospital Course:   Patient is a 57-year-old  female with a past medical history significant for hypertension who presents the hospital with what appeared to be a poorly controlled necrotizing pneumonia failing outpatient management.  Patient was  initially started on broad-spectrum IV antibiotics and CT surgery and pulmonology were consulted.  Patient was continued on antibiotics for approximately 7 days in the hospital and complete the course without difficulty.  There was initial concern that the patient may need a lobectomy secondary to the extent of her pneumonia, however she responded well with IV antibiotics.  She did have some intermittent periods of hypotension and fever, but after approximately 5 days had no further episodes of hypotension and fever and the patient clinically was much improved.      It was the recommendation of a CT surgery and pulmonology the patient try a course of outpatient oral antibiotics for approximately 4 weeks prior to attempting lobectomy to see if the antibiotics could salvage the patient's pneumonia.  Patient did have some significant hemoptysis throughout the course of her hospitalization which had improved somewhat prior to discharge. She will be discharged home on oral antibiotics with clindamycin as well as weekly chest x-rays for the next 4 weeks, as well as cough/fever/pain/sleep medication is p.r.n..  She was instructed return to the hospital if she experience worsening cough chest pain or fever.  Patient was seen on the day of discharge and deemed medically stable for discharge.     Consults:   Consults         Status Ordering Provider     Inpatient consult to Pulmonology  Once     Provider:  Matt Peralta MD    Completed DELTA MORTON     Inpatient consult to Registered Dietitian/Nutritionist  Once     Provider:  (Not yet assigned)    Completed WILLIAM DAIGLE          No new Assessment & Plan notes have been filed under this hospital service since the last note was generated.  Service: Hospital Medicine    Final Active Diagnoses:    Diagnosis Date Noted POA    PRINCIPAL PROBLEM:  Necrotizing pneumonia [J85.0] 06/29/2018 Yes    Abscess of right lung with pneumonia [J85.1] 06/29/2018 Yes    Hemoptysis  [R04.2] 06/29/2018 Yes    Moderate malnutrition [E44.0] 07/05/2018 Yes    Hypokalemia [E87.6] 06/30/2018 No    Caries [K02.9] 06/29/2018 Yes      Problems Resolved During this Admission:    Diagnosis Date Noted Date Resolved POA    Hypotension [I95.9] 07/02/2018 07/05/2018 No       Discharged Condition: stable    Disposition: Home or Self Care    Follow Up:  Follow-up Information     Jasmine Green PA-C On 7/20/2018.    Specialty:  Family Medicine  Why:  @3:20pm   Contact information:  [address]  [place] [address]  [phone]             Matt Peralta MD In 2 weeks.    Specialty:  Pulmonary Disease  Contact information:  [address]  [address]  [address]  [facility] [address]  [phone]                 Patient Instructions:     X-Ray Chest PA And Lateral   Standing Status: Standing Number of Occurrences: 4 Standing Exp. Date: 09/06/18   Order Specific Question Answer Comments   May the Radiologist modify the order per protocol to meet the clinical needs of the patient? Yes      Diet Adult Regular     Notify your health care provider if you experience any of the following:  temperature >100.4     Notify your health care provider if you experience any of the following:  severe uncontrolled pain     Notify your health care provider if you experience any of the following:  difficulty breathing or increased cough     Activity as tolerated         Significant Diagnostic Studies:     Pending Diagnostic Studies:     None         Medications:  Reconciled Home Medications:      Medication List      START taking these medications    clindamycin 300 MG capsule  Commonly known as:  CLEOCIN  Take 1 capsule (300 mg total) by mouth 3 (three) times daily. for 14 days     HYDROcodone-acetaminophen 5-325 mg per tablet  Commonly known as:  NORCO  Take 1 tablet by mouth every 6 (six) hours.     hydrocodone-chlorpheniramine 10-8 mg/5 mL suspension  Commonly known as:  TUSSIONEX  Take 5 mLs by mouth every 12 (twelve)  hours.     ibuprofen 600 MG tablet  Commonly known as:  ADVIL,MOTRIN  Take 1 tablet (600 mg total) by mouth 4 (four) times daily.     zolpidem 5 MG Tab  Commonly known as:  AMBIEN  Take 1 tablet (5 mg total) by mouth nightly as needed.        STOP taking these medications    levoFLOXacin 750 MG tablet  Commonly known as:  LEVAQUIN            Indwelling Lines/Drains at time of discharge:   Lines/Drains/Airways          No matching active lines, drains, or airways          Time spent on the discharge of patient: 38 minutes  Patient was seen and examined on the date of discharge and determined to be suitable for discharge.         Melvin Garcia MD  Department of Hospital Medicine  [facility]

## 2018-07-09 ENCOUNTER — TELEPHONE (OUTPATIENT)
Dept: PULMONOLOGY | Facility: CLINIC | Age: 58
End: 2018-07-09

## 2018-07-09 NOTE — TELEPHONE ENCOUNTER
Pt given appt for 7/10/2018 at 11:00am  ----- Message from Ramya Rizvi sent at 7/9/2018  8:57 AM CDT -----  pls work pt in next wk for hosp f/u, nothing came up..[phone] (home)

## 2018-07-10 ENCOUNTER — OFFICE VISIT (OUTPATIENT)
Dept: PULMONOLOGY | Facility: CLINIC | Age: 58
End: 2018-07-10
Payer: COMMERCIAL

## 2018-07-10 VITALS
OXYGEN SATURATION: 99 % | BODY MASS INDEX: 22.85 KG/M2 | DIASTOLIC BLOOD PRESSURE: 80 MMHG | HEART RATE: 86 BPM | WEIGHT: 142.19 LBS | SYSTOLIC BLOOD PRESSURE: 126 MMHG | HEIGHT: 66 IN

## 2018-07-10 DIAGNOSIS — J85.1 ABSCESS OF RIGHT LUNG WITH PNEUMONIA, UNSPECIFIED PART OF LUNG: ICD-10-CM

## 2018-07-10 DIAGNOSIS — J85.0 NECROTIZING PNEUMONIA: ICD-10-CM

## 2018-07-10 DIAGNOSIS — B37.9 YEAST INFECTION: Primary | ICD-10-CM

## 2018-07-10 DIAGNOSIS — R04.2 HEMOPTYSIS: ICD-10-CM

## 2018-07-10 DIAGNOSIS — G47.00 INSOMNIA, UNSPECIFIED TYPE: ICD-10-CM

## 2018-07-10 PROCEDURE — 99999 PR PBB SHADOW E&M-EST. PATIENT-LVL III: CPT | Mod: PBBFAC,,, | Performed by: INTERNAL MEDICINE

## 2018-07-10 PROCEDURE — 99214 OFFICE O/P EST MOD 30 MIN: CPT | Mod: S$GLB,,, | Performed by: INTERNAL MEDICINE

## 2018-07-10 PROCEDURE — 3008F BODY MASS INDEX DOCD: CPT | Mod: CPTII,S$GLB,, | Performed by: INTERNAL MEDICINE

## 2018-07-10 RX ORDER — AMOXICILLIN 875 MG/1
875 TABLET, FILM COATED ORAL EVERY 12 HOURS
Qty: 42 TABLET | Refills: 2 | Status: SHIPPED | OUTPATIENT
Start: 2018-07-10 | End: 2018-07-20

## 2018-07-10 RX ORDER — CHLORHEXIDINE GLUCONATE ORAL RINSE 1.2 MG/ML
15 SOLUTION DENTAL 2 TIMES DAILY
Qty: 473 ML | Refills: 5 | Status: SHIPPED | OUTPATIENT
Start: 2018-07-10 | End: 2019-03-18

## 2018-07-10 RX ORDER — FLUCONAZOLE 200 MG/1
200 TABLET ORAL DAILY
Qty: 7 TABLET | Refills: 0 | Status: SHIPPED | OUTPATIENT
Start: 2018-07-10 | End: 2018-07-17

## 2018-07-10 RX ORDER — ZOLPIDEM TARTRATE 5 MG/1
5 TABLET ORAL NIGHTLY PRN
Qty: 20 TABLET | Refills: 2 | Status: SHIPPED | OUTPATIENT
Start: 2018-07-10 | End: 2018-07-20 | Stop reason: ALTCHOICE

## 2018-07-10 NOTE — PROGRESS NOTES
"7/10/2018    [facility] Follow Up    Chief Complaint   Patient presents with    Hospital f/u    Cough    Sputum Production     greenish brown       HPI: pt still cough and foul mucous but feels better.  On clindamycin.  Drenching night sweats better than pta.  Reports yeast.  Needs dental work.        The chief compliant  problem is "stable   PFSH:  History reviewed. No pertinent past medical history.      Past Surgical History:   Procedure Laterality Date    baker's cyst removal      BREAST SURGERY      HYSTERECTOMY      KNEE SURGERY Bilateral     laproscopic surgery    SHOULDER SURGERY Left     laproscopic     Social History   Substance Use Topics    Smoking status: Former Smoker     Packs/day: 1.00     Years: 30.00     Types: Cigarettes     Quit date: 2013    Smokeless tobacco: Never Used      Comment: Patient now vapes    Alcohol use 4.2 oz/week     7 Glasses of wine per week      Comment: glass of wine daily     Family History   Problem Relation Age of Onset    Rheum arthritis Mother     Heart disease Father     Parkinsonism Father      Review of patient's allergies indicates:  No Known Allergies    Performance Status:The patient's activity level is functions out of house.      Review of Systems:  a review of eleven systems covering constitutional, Eye, HEENT, Psych, Respiratory, Cardiac, GI, , Musculoskeletal, Endocrine, Dermatologic was negative except for pertinent findings as listed ABOVE and below: wt loss, weak,  pertinent positive as above, rest is good       Exam:Comprehensive exam done. /80 (BP Location: Right arm, Patient Position: Sitting)   Pulse 86   Ht 5' 6" (1.676 m)   Wt 64.5 kg (142 lb 3.2 oz)   SpO2 99%   BMI 22.95 kg/m²   Exam included Vitals as listed, and patient's appearance and affect and alertness and mood, oral exam for yeast and hygiene and pharynx lesions and Mallapatti (M) score, neck with inspection for jvd and masses and thyroid " abnormalities and lymph nodes (supraclavicular and infraclavicular nodes and axillary also examined and noted if abn), chest exam included symmetry and effort and fremitus and percussion and auscultation, cardiac exam included rhythm and gallops and murmur and rubs and jvd and edema, abdominal exam for mass and hepatosplenomegaly and tenderness and hernias and bowel sounds, Musculoskeletal exam with muscle tone and posture and mobility/gait and  strength, and skin for rashes and cyanosis and pallor and turgor, extremity for clubbing.  Findings were normal except for pertinent findings listed below:  M2, chest is symmetric, no distress, normal percussion, normal fremitus and good normal breath sounds      Radiographs (ct chest and cxr) reviewed: view by direct vision  cxr May 15,2018 urgent care had min density right upper lung, cxr from nsr and ct viewed- 2 areas lung abscess rul and rll  seend    Labs reviewed         PFT was not done       Plan:  Clinical impression is apparently straight forward and impression with management as below.     Jesi was seen today for hospital f/u, cough and sputum production.    Diagnoses and all orders for this visit:    Yeast infection  -     fluconazole (DIFLUCAN) 200 MG Tab; Take 1 tablet (200 mg total) by mouth once daily. for 7 days    Necrotizing pneumonia  -     amoxicillin (AMOXIL) 875 MG tablet; Take 1 tablet (875 mg total) by mouth every 12 (twelve) hours.  -     chlorhexidine (PERIDEX) 0.12 % solution; Use as directed 15 mLs in the mouth or throat 2 (two) times daily.    Hemoptysis  -     amoxicillin (AMOXIL) 875 MG tablet; Take 1 tablet (875 mg total) by mouth every 12 (twelve) hours.    Abscess of right lung with pneumonia, unspecified part of lung  -     amoxicillin (AMOXIL) 875 MG tablet; Take 1 tablet (875 mg total) by mouth every 12 (twelve) hours.  -     chlorhexidine (PERIDEX) 0.12 % solution; Use as directed 15 mLs in the mouth or throat 2 (two) times  daily.    Insomnia, unspecified type  -     zolpidem (AMBIEN) 5 MG Tab; Take 1 tablet (5 mg total) by mouth nightly as needed.        Follow-up if symptoms worsen or fail to improve.    Discussed with patient above for education the following:      Patient Instructions   Call in to get return to work note if or when needed.    Do chest xray once near completed antibiotic.    Would do cxr if any problems/question.    Go to amoxil when out of clindamycin or having bad clindamycin problems.    Yeast pill- one usually clears, will give 7 - take only if needed for yeast    Once no mucous 3-7 days at least- consider stopping amoxil     peridex would be good at bedtime-    Suggest follow up to review cxr if not normal

## 2018-07-10 NOTE — PATIENT INSTRUCTIONS
Call in to get return to work note if or when needed.    Do chest xray once near completed antibiotic.    Would do cxr if any problems/question.    Go to amoxil when out of clindamycin or having bad clindamycin problems.    Yeast pill- one usually clears, will give 7 - take only if needed for yeast    Once no mucous 3-7 days at least- consider stopping amoxil     peridex would be good at bedtime-    Suggest follow up to review cxr if not normal

## 2018-07-20 ENCOUNTER — OFFICE VISIT (OUTPATIENT)
Dept: FAMILY MEDICINE | Facility: CLINIC | Age: 58
End: 2018-07-20
Payer: COMMERCIAL

## 2018-07-20 ENCOUNTER — LAB VISIT (OUTPATIENT)
Dept: LAB | Facility: HOSPITAL | Age: 58
End: 2018-07-20
Attending: PHYSICIAN ASSISTANT
Payer: COMMERCIAL

## 2018-07-20 ENCOUNTER — HOSPITAL ENCOUNTER (OUTPATIENT)
Dept: RADIOLOGY | Facility: CLINIC | Age: 58
Discharge: HOME | End: 2018-07-20
Attending: PHYSICIAN ASSISTANT
Payer: COMMERCIAL

## 2018-07-20 ENCOUNTER — TELEPHONE (OUTPATIENT)
Dept: FAMILY MEDICINE | Facility: CLINIC | Age: 58
End: 2018-07-20

## 2018-07-20 VITALS
BODY MASS INDEX: 22.99 KG/M2 | RESPIRATION RATE: 16 BRPM | HEART RATE: 74 BPM | WEIGHT: 143.06 LBS | HEIGHT: 66 IN | SYSTOLIC BLOOD PRESSURE: 128 MMHG | DIASTOLIC BLOOD PRESSURE: 82 MMHG | OXYGEN SATURATION: 97 %

## 2018-07-20 DIAGNOSIS — J85.0 NECROTIZING PNEUMONIA: ICD-10-CM

## 2018-07-20 DIAGNOSIS — K52.1 ANTIBIOTIC-ASSOCIATED DIARRHEA: ICD-10-CM

## 2018-07-20 DIAGNOSIS — J85.0 NECROTIZING PNEUMONIA: Primary | ICD-10-CM

## 2018-07-20 DIAGNOSIS — T36.95XA ANTIBIOTIC-ASSOCIATED DIARRHEA: ICD-10-CM

## 2018-07-20 DIAGNOSIS — K02.9 CARIES: ICD-10-CM

## 2018-07-20 DIAGNOSIS — G47.00 INSOMNIA, UNSPECIFIED TYPE: ICD-10-CM

## 2018-07-20 DIAGNOSIS — B37.9 YEAST INFECTION: ICD-10-CM

## 2018-07-20 LAB
ALBUMIN SERPL BCP-MCNC: 3.4 G/DL
ALP SERPL-CCNC: 77 U/L
ALT SERPL W/O P-5'-P-CCNC: 26 U/L
ANION GAP SERPL CALC-SCNC: 9 MMOL/L
AST SERPL-CCNC: 30 U/L
BASOPHILS # BLD AUTO: 0.05 K/UL
BASOPHILS NFR BLD: 0.7 %
BILIRUB SERPL-MCNC: 0.2 MG/DL
BUN SERPL-MCNC: 7 MG/DL
CALCIUM SERPL-MCNC: 9.6 MG/DL
CHLORIDE SERPL-SCNC: 104 MMOL/L
CO2 SERPL-SCNC: 25 MMOL/L
CREAT SERPL-MCNC: 0.8 MG/DL
DIFFERENTIAL METHOD: ABNORMAL
EOSINOPHIL # BLD AUTO: 0.1 K/UL
EOSINOPHIL NFR BLD: 1.9 %
ERYTHROCYTE [DISTWIDTH] IN BLOOD BY AUTOMATED COUNT: 13.7 %
EST. GFR  (AFRICAN AMERICAN): >60 ML/MIN/1.73 M^2
EST. GFR  (NON AFRICAN AMERICAN): >60 ML/MIN/1.73 M^2
GLUCOSE SERPL-MCNC: 90 MG/DL
HCT VFR BLD AUTO: 39.7 %
HGB BLD-MCNC: 12.7 G/DL
IMM GRANULOCYTES # BLD AUTO: 0.01 K/UL
IMM GRANULOCYTES NFR BLD AUTO: 0.1 %
LYMPHOCYTES # BLD AUTO: 2.8 K/UL
LYMPHOCYTES NFR BLD: 41.2 %
MCH RBC QN AUTO: 32.6 PG
MCHC RBC AUTO-ENTMCNC: 32 G/DL
MCV RBC AUTO: 102 FL
MONOCYTES # BLD AUTO: 0.6 K/UL
MONOCYTES NFR BLD: 9.4 %
NEUTROPHILS # BLD AUTO: 3.1 K/UL
NEUTROPHILS NFR BLD: 46.7 %
NRBC BLD-RTO: 0 /100 WBC
PLATELET # BLD AUTO: 286 K/UL
PMV BLD AUTO: 11.1 FL
POTASSIUM SERPL-SCNC: 4.7 MMOL/L
PROT SERPL-MCNC: 7.5 G/DL
RBC # BLD AUTO: 3.9 M/UL
SODIUM SERPL-SCNC: 138 MMOL/L
WBC # BLD AUTO: 6.72 K/UL

## 2018-07-20 PROCEDURE — 36415 COLL VENOUS BLD VENIPUNCTURE: CPT | Mod: PO

## 2018-07-20 PROCEDURE — 99214 OFFICE O/P EST MOD 30 MIN: CPT | Mod: S$GLB,,, | Performed by: PHYSICIAN ASSISTANT

## 2018-07-20 PROCEDURE — 71046 X-RAY EXAM CHEST 2 VIEWS: CPT | Mod: 26,,, | Performed by: RADIOLOGY

## 2018-07-20 PROCEDURE — 3008F BODY MASS INDEX DOCD: CPT | Mod: CPTII,S$GLB,, | Performed by: PHYSICIAN ASSISTANT

## 2018-07-20 PROCEDURE — 99999 PR PBB SHADOW E&M-EST. PATIENT-LVL IV: CPT | Mod: PBBFAC,,, | Performed by: PHYSICIAN ASSISTANT

## 2018-07-20 PROCEDURE — 80053 COMPREHEN METABOLIC PANEL: CPT

## 2018-07-20 PROCEDURE — 85025 COMPLETE CBC W/AUTO DIFF WBC: CPT

## 2018-07-20 PROCEDURE — 71046 X-RAY EXAM CHEST 2 VIEWS: CPT | Mod: TC,FY,PO

## 2018-07-20 RX ORDER — TRAZODONE HYDROCHLORIDE 50 MG/1
50 TABLET ORAL NIGHTLY
Qty: 30 TABLET | Refills: 2 | Status: SHIPPED | OUTPATIENT
Start: 2018-07-20 | End: 2018-10-03 | Stop reason: SDUPTHER

## 2018-07-20 RX ORDER — FLUCONAZOLE 150 MG/1
TABLET ORAL
Qty: 3 TABLET | Refills: 0 | Status: SHIPPED | OUTPATIENT
Start: 2018-07-20 | End: 2018-08-09 | Stop reason: SDUPTHER

## 2018-07-20 NOTE — PROGRESS NOTES
Subjective:       Patient ID: Jesi Nagy is a 58 y.o. female.    Chief Complaint: Transitional Care    Transitional Care Note  Admit date: 06/29/18  Discharge date: 07/06/18  Date of interactive contact (2 business days post D/C): none  Hospitalized at: [facility]  Discharge diagnoses: necrotizing pneumonia    Family and/or Caretaker present at visit?  No.  Diagnostic tests reviewed/disposition: I have reviewed all completed as well as pending diagnostic tests at the time of discharge.  Disease/illness education: Seek urgent care when coughing develops  Medication changes:started on clindamycin 300mg tid, norco 5/ 325 q 6 hours prn, tussionex 5ml every 12 hours, ibuprofen 600mg 4 times daily, ambien 5mg nightly prn  Home health/community services discussion/referrals: Patient does not have home health established from hospital visit.  They do not need home health.  If needed, we will set up home health for the patient.   Establishment or re-establishment of referral orders for community resources: No other necessary community resources.   Discussion with other health care providers: No discussion with other health care providers necessary.  Will send Dr. Peralta a message once xray results are received.       Ms. Nagy comes to clinic today for hospital follow up. She was admitted to hospital medicine from the primary care clinic after being found to have necrotizing pneumonia. The patient was started on broad spectrum IV antibiotics and CT surgery and pulmonology were consulted.  There was consideration that the patient may need a lobectomy due to the extent of the pneumonia but the patient responded to the IV antibiotics.  There patient had intermittent episodes of hypotension and fever which resolved after 5 days. CT surgery and pulmonology recommended treatment with outpatient oral antibiotics with weekly xrays. The patient was discharged on clindamycin and other prn medications to help with cough  and pain. The patient did follow up with Dr. Peralta after hospital discharge. Her antibiotic was changed to amoxil at that time. The patient complained of yeast infection at the time of the appointment. The patient reports that since taking the amoxil she has continued to have diarrhea and the yeast infection has worsened. She reports that her cough has completely resolved. The patient reports she is feeling much better other than yeast infection. Dr. Peralta's note advised to stop the amoxil 3-7 days after resolution of the cough and mucous production. The patient does have difficulty sleeping. She reports that the ambien 5mg is not helping her sleep. She reports she has some anxiety related to her recent illness. The patient has not seen a dentist yet for the dental caries; she plans to do this soon.           Review of Systems   Constitutional: Negative for activity change, appetite change and fever.   HENT: Negative for postnasal drip, rhinorrhea and sinus pressure.    Eyes: Negative for visual disturbance.   Respiratory: Negative for cough and shortness of breath.    Cardiovascular: Negative for chest pain.   Gastrointestinal: Negative for abdominal distention and abdominal pain.   Genitourinary: Negative for difficulty urinating and dysuria.        Reports yeast infection   Musculoskeletal: Negative for arthralgias and myalgias.   Neurological: Negative for headaches.   Hematological: Negative for adenopathy.   Psychiatric/Behavioral: The patient is not nervous/anxious.        Objective:      Physical Exam   Constitutional: She is oriented to person, place, and time.   HENT:   Mouth/Throat: Oropharynx is clear and moist. No oropharyngeal exudate.   Eyes: Conjunctivae are normal. Pupils are equal, round, and reactive to light.   Cardiovascular: Normal rate and regular rhythm.    Pulmonary/Chest: Effort normal and breath sounds normal. She has no wheezes.   Abdominal: Soft. Bowel sounds are normal. There is no  tenderness.   Musculoskeletal: She exhibits no edema.   Lymphadenopathy:     She has no cervical adenopathy.   Neurological: She is alert and oriented to person, place, and time.   Skin: No erythema.   Psychiatric: Her behavior is normal.       Assessment:       1. Necrotizing pneumonia    2. Yeast infection    3. Antibiotic-associated diarrhea    4. Insomnia, unspecified type    5. Caries        Plan:   Jesi was seen today for transitional care.    Diagnoses and all orders for this visit:    Necrotizing pneumonia  -     X-Ray Chest PA And Lateral; Future  -     CBC auto differential; Future  -     Comprehensive metabolic panel; Future  Follow up with Dr. Peralta  Yeast infection  -     fluconazole (DIFLUCAN) 150 MG Tab; Take one tablet by mouth. If not improvement after 3 days take additional tablet.    Antibiotic-associated diarrhea  -     Clostridium difficile EIA; Future    Insomnia, unspecified type  -     traZODone (DESYREL) 50 MG tablet; Take 1 tablet (50 mg total) by mouth every evening.    Caries  Follow up with dentist.

## 2018-07-20 NOTE — TELEPHONE ENCOUNTER
Dr. Peralta,    [place] patient, Ms. Nagy,  was seen today for hospital follow up. Clinically she is improving with no cough or mucous production. Your office visit note stated that after 3-7 days of symptom improvement,  could stop the amoxil. The patient also had continued diarrhea and severe yeast infection. Based on her clinical improvement and side effects, I advised her to stop the amoxil. We repeated the CXR today that revealed continued improvement with mild disease remaining with cavitation.   What are your recommendations at this time?    Thank you!  Jasmine Green PA-C  [facility]

## 2018-07-24 ENCOUNTER — TELEPHONE (OUTPATIENT)
Dept: PULMONOLOGY | Facility: CLINIC | Age: 58
End: 2018-07-24

## 2018-07-24 NOTE — TELEPHONE ENCOUNTER
LVM with instructions to from dr cobos for chest xrays   ----- Message from Nazia Hill sent at 7/24/2018 12:15 PM CDT -----  Contact: self  Returning your call regarding chest xrays and an appt. Please call back at [phone] (fjai)

## 2018-07-24 NOTE — TELEPHONE ENCOUNTER
attempted to call pt. No answer, LVM for pt to return my call.     ----- Message from Matt Peralta MD sent at 7/20/2018  9:26 PM CDT -----  Reviewed cxr- normal cxr is not expected and cxr looks optimal for this stage- expect scar will contract,reducing with time.  Stop abx and cxr f/u - could do q 2 wks x 2 and office f/u with me then?

## 2018-07-24 NOTE — TELEPHONE ENCOUNTER
Put return to work letter at  for pt, scheduled follow up and advised to have cxr repeated.     ----- Message from Maribel Allred sent at 7/24/2018  3:22 PM CDT -----  Type:  Patient Returning Call    Who Called:  self  Who Left Message for Patient:  Tracey  Does the patient know what this is regarding?:    Best Call Back Number:  [phone] (home)     Additional Information:

## 2018-07-26 ENCOUNTER — CLINICAL SUPPORT (OUTPATIENT)
Dept: CARDIOLOGY | Facility: CLINIC | Age: 58
End: 2018-07-26
Attending: PHYSICIAN ASSISTANT
Payer: COMMERCIAL

## 2018-07-26 VITALS — HEIGHT: 66 IN | BODY MASS INDEX: 22.99 KG/M2 | WEIGHT: 143.06 LBS

## 2018-07-26 DIAGNOSIS — R42 DIZZINESS: ICD-10-CM

## 2018-07-26 LAB
ASCENDING AORTA: 2.48 CM
BSA FOR ECHO PROCEDURE: 1.74 M2
CV ECHO LV RWT: 0.39 CM
CV STRESS BASE HR: 75
CVPEAKDIABP: 100
CVPEAKSYSBP: 199
CVRESTDIABP: 95
CVRESTSYSBP: 141
DOP CALC LVOT AREA: 3.11 CM2
DOP CALC LVOT DIAMETER: 1.99 CM
DOP CALC LVOT STROKE VOLUME: 53.87 CM3
DOP CALCLVOT PEAK VEL VTI: 17.33 CM
E WAVE DECELERATION TIME: 175.84 MSEC
E/A RATIO: 1.1
E/E' RATIO: 9.07
ECHO LV POSTERIOR WALL: 0.87 CM (ref 0.6–1.1)
FRACTIONAL SHORTENING: 29 % (ref 28–44)
INTERVENTRICULAR SEPTUM: 0.76 CM (ref 0.6–1.1)
IVRT: 0.14 MSEC
LEFT ATRIUM SIZE: 2.48 CM
LEFT INTERNAL DIMENSION IN SYSTOLE: 2.97 CM (ref 2.1–4)
LEFT VENTRICLE MASS INDEX: 59.7 G/M2
LEFT VENTRICULAR INTERNAL DIMENSION IN DIASTOLE: 4.2 CM (ref 3.5–6)
LEFT VENTRICULAR MASS: 103.82 G
LV LATERAL E/E' RATIO: 8.5
LV SEPTAL E/E' RATIO: 9.71
MV PEAK A VEL: 0.62 M/S
MV PEAK E VEL: 0.68 M/S
MV STENOSIS PRESSURE HALF TIME: 50.99 MS
MV VALVE AREA P 1/2 METHOD: 4.31 CM2
PULM VEIN S/D RATIO: 1.41
PV PEAK D VEL: 0.29 M/S
PV PEAK S VEL: 0.41 M/S
SINUS: 2.36 CM
STJ: 2.13 CM
STRESS ECHO POST ESTIMATED WORKLOAD: 8 METS
STRESS ECHO POST EXERCISE DUR MIN: 5 MIN
STRESS ECHO POST EXERCISE DUR SEC: 15
TDI LATERAL: 0.08
TDI SEPTAL: 0.07
TDI: 0.08

## 2018-07-26 PROCEDURE — 99999 PR PBB SHADOW E&M-EST. PATIENT-LVL I: CPT | Mod: PBBFAC,,,

## 2018-07-26 PROCEDURE — 93351 STRESS TTE COMPLETE: CPT | Mod: S$GLB,,, | Performed by: INTERNAL MEDICINE

## 2018-08-03 ENCOUNTER — HOSPITAL ENCOUNTER (OUTPATIENT)
Dept: RADIOLOGY | Facility: CLINIC | Age: 58
Discharge: HOME | End: 2018-08-03
Attending: HOSPITALIST
Payer: COMMERCIAL

## 2018-08-03 DIAGNOSIS — J85.0 NECROTIZING PNEUMONIA: ICD-10-CM

## 2018-08-03 PROCEDURE — 71046 X-RAY EXAM CHEST 2 VIEWS: CPT | Mod: 26,,, | Performed by: RADIOLOGY

## 2018-08-03 PROCEDURE — 71046 X-RAY EXAM CHEST 2 VIEWS: CPT | Mod: TC,FY,PO

## 2018-08-07 ENCOUNTER — TELEPHONE (OUTPATIENT)
Dept: FAMILY MEDICINE | Facility: CLINIC | Age: 58
End: 2018-08-07

## 2018-08-08 ENCOUNTER — APPOINTMENT (OUTPATIENT)
Dept: LAB | Facility: HOSPITAL | Age: 58
End: 2018-08-08
Attending: PHYSICIAN ASSISTANT
Payer: COMMERCIAL

## 2018-08-09 DIAGNOSIS — A04.72 C. DIFFICILE DIARRHEA: Primary | ICD-10-CM

## 2018-08-09 DIAGNOSIS — B37.9 YEAST INFECTION: ICD-10-CM

## 2018-08-09 RX ORDER — METRONIDAZOLE 500 MG/1
500 TABLET ORAL 3 TIMES DAILY
Qty: 30 TABLET | Refills: 0 | Status: SHIPPED | OUTPATIENT
Start: 2018-08-09 | End: 2018-08-17 | Stop reason: ALTCHOICE

## 2018-08-09 RX ORDER — FLUCONAZOLE 150 MG/1
TABLET ORAL
Qty: 3 TABLET | Refills: 0 | Status: SHIPPED | OUTPATIENT
Start: 2018-08-09 | End: 2018-08-17 | Stop reason: ALTCHOICE

## 2018-08-17 ENCOUNTER — OFFICE VISIT (OUTPATIENT)
Dept: FAMILY MEDICINE | Facility: CLINIC | Age: 58
End: 2018-08-17
Payer: COMMERCIAL

## 2018-08-17 ENCOUNTER — HOSPITAL ENCOUNTER (OUTPATIENT)
Dept: RADIOLOGY | Facility: CLINIC | Age: 58
Discharge: HOME | End: 2018-08-17
Attending: HOSPITALIST
Payer: COMMERCIAL

## 2018-08-17 VITALS
WEIGHT: 146.19 LBS | DIASTOLIC BLOOD PRESSURE: 64 MMHG | TEMPERATURE: 98 F | HEIGHT: 66 IN | BODY MASS INDEX: 23.49 KG/M2 | RESPIRATION RATE: 17 BRPM | OXYGEN SATURATION: 96 % | HEART RATE: 76 BPM | SYSTOLIC BLOOD PRESSURE: 118 MMHG

## 2018-08-17 DIAGNOSIS — Z12.39 BREAST CANCER SCREENING: ICD-10-CM

## 2018-08-17 DIAGNOSIS — J85.0 NECROTIZING PNEUMONIA: ICD-10-CM

## 2018-08-17 DIAGNOSIS — A04.72 C. DIFFICILE DIARRHEA: Primary | ICD-10-CM

## 2018-08-17 DIAGNOSIS — R93.89 ABNORMAL CXR: ICD-10-CM

## 2018-08-17 DIAGNOSIS — B37.9 YEAST INFECTION: ICD-10-CM

## 2018-08-17 PROCEDURE — 99999 PR PBB SHADOW E&M-EST. PATIENT-LVL V: CPT | Mod: PBBFAC,,, | Performed by: PHYSICIAN ASSISTANT

## 2018-08-17 PROCEDURE — 99213 OFFICE O/P EST LOW 20 MIN: CPT | Mod: S$GLB,,, | Performed by: PHYSICIAN ASSISTANT

## 2018-08-17 PROCEDURE — 3008F BODY MASS INDEX DOCD: CPT | Mod: CPTII,S$GLB,, | Performed by: PHYSICIAN ASSISTANT

## 2018-08-17 PROCEDURE — 71046 X-RAY EXAM CHEST 2 VIEWS: CPT | Mod: 26,,, | Performed by: RADIOLOGY

## 2018-08-17 PROCEDURE — 71046 X-RAY EXAM CHEST 2 VIEWS: CPT | Mod: TC,FY,PO

## 2018-08-17 NOTE — PROGRESS NOTES
Subjective:       Patient ID: Jesi Nagy is a 58 y.o. female.    Chief Complaint: Follow-up (10d c.diff)    Ms. Nagy comes to clinic today for follow up of C diff. The patient has completed the flagyl and she has been free of diarrhea for the last 4 days. She reports that her yeast infection has also resolved. The patient reports overall she is feeling much better. The patient is scheduled to follow up with Dr. Peralta next week after repeating her chest xray. The patient has no new complaints today. She will be establishing care with her new PCP in October. The patient is due for some health maintenance items; she wishes to wait until she follows up with Dr. Peralta and things with her health normalize.       Review of Systems   Constitutional: Negative for activity change, appetite change and fever.   HENT: Negative for postnasal drip, rhinorrhea and sinus pressure.    Eyes: Negative for visual disturbance.   Respiratory: Negative for cough and shortness of breath.    Cardiovascular: Negative for chest pain.   Gastrointestinal: Negative for abdominal distention, abdominal pain and diarrhea.   Genitourinary: Negative for difficulty urinating and dysuria.   Musculoskeletal: Negative for arthralgias and myalgias.   Neurological: Negative for headaches.   Hematological: Negative for adenopathy.   Psychiatric/Behavioral: The patient is not nervous/anxious.        Objective:      Physical Exam   Constitutional: She is oriented to person, place, and time.   Cardiovascular: Normal rate and regular rhythm.   Pulmonary/Chest: Effort normal and breath sounds normal. She has no wheezes.   Abdominal: Soft. Bowel sounds are normal. There is no tenderness.   Musculoskeletal: She exhibits no edema.   Neurological: She is alert and oriented to person, place, and time.   Skin: No erythema.   Psychiatric: Her behavior is normal.       Assessment:       1. C. difficile diarrhea    2. Yeast infection    3. Abnormal CXR    4.  Breast cancer screening        Plan:   Jesi was seen today for follow-up.    Diagnoses and all orders for this visit:    C. difficile diarrhea  Flagyl completed  Diarrhea resolved  Yeast infection  resolved  Abnormal CXR  Follow up with Dr. Peralta as scheduled next week.   Breast cancer screening  -     Mammo Digital Screening Bilateral With CAD; Future

## 2018-08-17 NOTE — LETTER
August 17, 2018    Jesi Nagy  [address] [address] [facility] [address]         [facility]  [address]  [facility] [address]  Phone: [phone]  Fax: [phone] August 17, 2018     Patient: Jesi Nagy   YOB: 1960   Date of Visit: 8/17/2018       To Whom It May Concern:    It is my medical opinion that Jesi Nagy should be excused from work August 9, 2018 through August 13, 2018.    If you have any questions or concerns, please don't hesitate to call.    Sincerely,            Jasmine Green PA-C

## 2018-08-20 ENCOUNTER — OFFICE VISIT (OUTPATIENT)
Dept: PULMONOLOGY | Facility: CLINIC | Age: 58
End: 2018-08-20
Payer: COMMERCIAL

## 2018-08-20 VITALS
HEART RATE: 78 BPM | SYSTOLIC BLOOD PRESSURE: 165 MMHG | WEIGHT: 144.19 LBS | BODY MASS INDEX: 23.17 KG/M2 | OXYGEN SATURATION: 99 % | HEIGHT: 66 IN | DIASTOLIC BLOOD PRESSURE: 99 MMHG

## 2018-08-20 DIAGNOSIS — M79.2 RADICULAR PAIN IN RIGHT ARM: ICD-10-CM

## 2018-08-20 DIAGNOSIS — M47.812 CERVICAL SPINE ARTHRITIS: ICD-10-CM

## 2018-08-20 DIAGNOSIS — M54.2 NECK PAIN: ICD-10-CM

## 2018-08-20 DIAGNOSIS — J85.0 NECROTIZING PNEUMONIA: Primary | ICD-10-CM

## 2018-08-20 DIAGNOSIS — M62.521 ATROPHY OF MUSCLE OF RIGHT UPPER ARM: ICD-10-CM

## 2018-08-20 DIAGNOSIS — R93.89 ABNORMAL CT OF THE CHEST: ICD-10-CM

## 2018-08-20 DIAGNOSIS — R04.2 HEMOPTYSIS: ICD-10-CM

## 2018-08-20 PROCEDURE — 99999 PR PBB SHADOW E&M-EST. PATIENT-LVL III: CPT | Mod: PBBFAC,,, | Performed by: INTERNAL MEDICINE

## 2018-08-20 PROCEDURE — 3008F BODY MASS INDEX DOCD: CPT | Mod: CPTII,S$GLB,, | Performed by: INTERNAL MEDICINE

## 2018-08-20 PROCEDURE — 99214 OFFICE O/P EST MOD 30 MIN: CPT | Mod: S$GLB,,, | Performed by: INTERNAL MEDICINE

## 2018-08-20 RX ORDER — TIZANIDINE 2 MG/1
4 TABLET ORAL EVERY 6 HOURS PRN
Qty: 120 TABLET | Refills: 1 | Status: SHIPPED | OUTPATIENT
Start: 2018-08-20 | End: 2018-09-17 | Stop reason: SDUPTHER

## 2018-08-20 RX ORDER — TRAMADOL HYDROCHLORIDE 50 MG/1
100 TABLET ORAL EVERY 8 HOURS PRN
Qty: 120 TABLET | Refills: 2 | Status: SHIPPED | OUTPATIENT
Start: 2018-08-20 | End: 2018-08-30

## 2018-08-20 NOTE — PROGRESS NOTES
"2018    [facility] Follow Up    Chief Complaint   Patient presents with    Results     per Dr. Peralta       HPI:     Aug 20, 2018 no resp c/o.  Having increased neck pain and radicular pain down right arm.  Was on tramadol in past with withdrawal from 120/d when could not get.  Wishes to try lower dose and more cautious use.  Uses peridex.  Got c diff- off few days- still diarrhea.    July 10, 2018pt still cough and foul mucous but feels better.  On clindamycin.  Drenching night sweats better than pta.  Reports yeast.  Needs dental work.  Has limited resources - needs dental work, uses peridex.             The chief compliant  problem is "stable   PFSH:  History reviewed. No pertinent past medical history.      Past Surgical History:   Procedure Laterality Date    baker's cyst removal      BREAST SURGERY      HYSTERECTOMY      KNEE SURGERY Bilateral     laproscopic surgery    SHOULDER SURGERY Left     laproscopic     Social History     Tobacco Use    Smoking status: Former Smoker     Packs/day: 1.00     Years: 30.00     Pack years: 30.00     Types: Cigarettes     Last attempt to quit:      Years since quittin.6    Smokeless tobacco: Never Used    Tobacco comment: Patient now vapes   Substance Use Topics    Alcohol use: Yes     Alcohol/week: 4.2 oz     Types: 7 Glasses of wine per week     Comment: glass of wine daily    Drug use: No     Family History   Problem Relation Age of Onset    Rheum arthritis Mother     Heart disease Father     Parkinsonism Father      Review of patient's allergies indicates:  No Known Allergies    Performance Status:The patient's activity level is functions out of house.      Review of Systems:  a review of eleven systems covering constitutional, Eye, HEENT, Psych, Respiratory, Cardiac, GI, , Musculoskeletal, Endocrine, Dermatologic was negative except for pertinent findings as listed ABOVE and below: wt loss, weak,  pertinent positive as above, " "rest is good       Exam:Comprehensive exam done. BP (!) 165/99 (BP Location: Right arm, Patient Position: Sitting)   Pulse 78   Ht 5' 6" (1.676 m)   Wt 65.4 kg (144 lb 2.9 oz)   SpO2 99% Comment: on room air  BMI 23.27 kg/m²   Exam included Vitals as listed, and patient's appearance and affect and alertness and mood, oral exam for yeast and hygiene and pharynx lesions and Mallapatti (M) score, neck with inspection for jvd and masses and thyroid abnormalities and lymph nodes (supraclavicular and infraclavicular nodes and axillary also examined and noted if abn), chest exam included symmetry and effort and fremitus and percussion and auscultation, cardiac exam included rhythm and gallops and murmur and rubs and jvd and edema, abdominal exam for mass and hepatosplenomegaly and tenderness and hernias and bowel sounds, Musculoskeletal exam with muscle tone and posture and mobility/gait and  strength, and skin for rashes and cyanosis and pallor and turgor, extremity for clubbing.  Findings were normal except for pertinent findings listed below:  M2, chest is symmetric, no distress, normal percussion, normal fremitus and good normal breath sounds- right arm is smaller than left in this right handed pt.      Radiographs (ct chest and cxr) reviewed: view by direct vision  cxr aug 17 with min scar right upper lung.  Labs reviewed         PFT was not done       Plan:  Clinical impression is apparently straight forward and impression with management as below.     Jesi was seen today for results.    Diagnoses and all orders for this visit:    Necrotizing pneumonia  -     CT Chest Without Contrast; Future    Cervical spine arthritis  -     MRI Cervical Spine Without Contrast; Future  -     traMADol (ULTRAM) 50 mg tablet; Take 2 tablets (100 mg total) by mouth every 8 (eight) hours as needed for Pain.  -     tiZANidine (ZANAFLEX) 2 MG tablet; Take 2 tablets (4 mg total) by mouth every 6 (six) hours as needed.    Neck " pain  -     MRI Cervical Spine Without Contrast; Future  -     traMADol (ULTRAM) 50 mg tablet; Take 2 tablets (100 mg total) by mouth every 8 (eight) hours as needed for Pain.  -     tiZANidine (ZANAFLEX) 2 MG tablet; Take 2 tablets (4 mg total) by mouth every 6 (six) hours as needed.    Atrophy of muscle of right upper arm  -     MRI Cervical Spine Without Contrast; Future  -     tiZANidine (ZANAFLEX) 2 MG tablet; Take 2 tablets (4 mg total) by mouth every 6 (six) hours as needed.    Radicular pain in right arm  -     MRI Cervical Spine Without Contrast; Future  -     traMADol (ULTRAM) 50 mg tablet; Take 2 tablets (100 mg total) by mouth every 8 (eight) hours as needed for Pain.  -     tiZANidine (ZANAFLEX) 2 MG tablet; Take 2 tablets (4 mg total) by mouth every 6 (six) hours as needed.    Hemoptysis  -     CT Chest Without Contrast; Future    Abnormal CT of the chest  -     CT Chest Without Contrast; Future  -     Mammo Digital Diagnostic Bilat with CAD; Future        Follow-up in about 3 months (around 11/20/2018), or if symptoms worsen or fail to improve.    Discussed with patient above for education the following:      Patient Instructions   Neck worse- check mri and consider f/u Dr Dejesus    Need dental extraction.    F/u ct chest in November and mammogram    Limit tramadol to 120/month- use zanaflex as needed for spasm neck.

## 2018-08-20 NOTE — PATIENT INSTRUCTIONS
Neck worse- check mri and consider f/u Dr Dejesus    Need dental extraction.    F/u ct chest in November and mammogram    Limit tramadol to 120/month- use zanaflex as needed for spasm neck.

## 2018-08-22 ENCOUNTER — TELEPHONE (OUTPATIENT)
Dept: FAMILY MEDICINE | Facility: CLINIC | Age: 58
End: 2018-08-22

## 2018-08-22 DIAGNOSIS — R19.7 DIARRHEA, UNSPECIFIED TYPE: Primary | ICD-10-CM

## 2018-08-22 NOTE — TELEPHONE ENCOUNTER
Spoke to patient , patent stated she was never better. I explained to patient she may have to do another stool study , patient was very upset stating she can not keep on taking off work for all these test.

## 2018-08-22 NOTE — TELEPHONE ENCOUNTER
----- Message from Ivy Bender LPN sent at 8/21/2018  6:57 PM CDT -----  Contact: Patient      ----- Message -----  From: Nikita Miller  Sent: 8/21/2018   2:15 PM  To: Norma Dennison, [phone]. Calling to get a refill for her medicine for diarrhea. Please advise. Thanks.

## 2018-08-22 NOTE — TELEPHONE ENCOUNTER
At the follow up appointment on 08/17/18 the patient advised me that she had been free from diarrhea for 4 days and that the patient returned to work on 08/14/18.  The patient should be advised that if her diarrhea is due to C diff that this is very contagious. We must find out if the diarrhea is due to C diff so it can be treated appropriately. If the patient failed flagyl for c diff treatment, the originally prescribed antibiotic, the patient may need a different stronger antibiotic. I do not want to give the patient unnecessary antibiotics as she has had such a difficult with yeast infections. Can the patient drop off a stool sample before or after work? We can leave a container at the  for her to  and she can drop the specimen off to the lab. 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

## 2018-08-22 NOTE — TELEPHONE ENCOUNTER
Please clarify request. Patient was recently treated for C diff and diarrhea resolved. If patient is having symptoms again, we need to check her for C diff. Order placed for stool study.

## 2018-08-23 NOTE — TELEPHONE ENCOUNTER
Patient notified. Verbalized understanding. Stool collection kit placed at front check in desk for patient to  in the morning.

## 2018-08-27 ENCOUNTER — LAB VISIT (OUTPATIENT)
Dept: LAB | Facility: HOSPITAL | Age: 58
End: 2018-08-27
Attending: PHYSICIAN ASSISTANT
Payer: COMMERCIAL

## 2018-08-27 DIAGNOSIS — R19.7 DIARRHEA, UNSPECIFIED TYPE: ICD-10-CM

## 2018-08-27 LAB
C DIFF GDH STL QL: NEGATIVE
C DIFF TOX A+B STL QL IA: NEGATIVE

## 2018-08-27 PROCEDURE — 87324 CLOSTRIDIUM AG IA: CPT

## 2018-09-01 LAB
ACID FAST MOD KINY STN SPEC: NORMAL
MYCOBACTERIUM SPEC QL CULT: NORMAL

## 2018-09-03 LAB
ACID FAST MOD KINY STN SPEC: NORMAL
MYCOBACTERIUM SPEC QL CULT: NORMAL

## 2018-09-17 DIAGNOSIS — M54.2 NECK PAIN: ICD-10-CM

## 2018-09-17 DIAGNOSIS — M79.2 RADICULAR PAIN IN RIGHT ARM: ICD-10-CM

## 2018-09-17 DIAGNOSIS — M47.812 CERVICAL SPINE ARTHRITIS: ICD-10-CM

## 2018-09-17 DIAGNOSIS — M62.521 ATROPHY OF MUSCLE OF RIGHT UPPER ARM: ICD-10-CM

## 2018-09-17 RX ORDER — TIZANIDINE 2 MG/1
4 TABLET ORAL EVERY 6 HOURS PRN
Qty: 120 TABLET | Refills: 1 | Status: SHIPPED | OUTPATIENT
Start: 2018-09-17 | End: 2018-09-27

## 2018-10-03 ENCOUNTER — OFFICE VISIT (OUTPATIENT)
Dept: FAMILY MEDICINE | Facility: CLINIC | Age: 58
End: 2018-10-03
Payer: COMMERCIAL

## 2018-10-03 VITALS
TEMPERATURE: 99 F | SYSTOLIC BLOOD PRESSURE: 140 MMHG | DIASTOLIC BLOOD PRESSURE: 82 MMHG | HEIGHT: 66 IN | HEART RATE: 84 BPM | WEIGHT: 143.31 LBS | BODY MASS INDEX: 23.03 KG/M2

## 2018-10-03 DIAGNOSIS — Z12.11 COLON CANCER SCREENING: ICD-10-CM

## 2018-10-03 DIAGNOSIS — M50.121 CERVICAL DISC DISORDER AT C4-C5 LEVEL WITH RADICULOPATHY: ICD-10-CM

## 2018-10-03 DIAGNOSIS — Z87.891 HISTORY OF TOBACCO ABUSE: ICD-10-CM

## 2018-10-03 DIAGNOSIS — K08.89 TOOTHACHE: ICD-10-CM

## 2018-10-03 DIAGNOSIS — G47.00 INSOMNIA, UNSPECIFIED TYPE: ICD-10-CM

## 2018-10-03 DIAGNOSIS — Z12.39 SCREENING FOR BREAST CANCER: ICD-10-CM

## 2018-10-03 DIAGNOSIS — R30.0 DYSURIA: Primary | ICD-10-CM

## 2018-10-03 LAB
BILIRUB SERPL-MCNC: NEGATIVE MG/DL
BLOOD, POC UA: 50
GLUCOSE UR QL STRIP: NORMAL
KETONES UR QL STRIP: ABNORMAL
LEUKOCYTE ESTERASE URINE, POC: ABNORMAL
NITRITE, POC UA: POSITIVE
PH, POC UA: 5
PROTEIN, POC: NEGATIVE
SPECIFIC GRAVITY, POC UA: 1.01
UROBILINOGEN, POC UA: NORMAL

## 2018-10-03 PROCEDURE — 90472 IMMUNIZATION ADMIN EACH ADD: CPT | Mod: S$GLB,,, | Performed by: FAMILY MEDICINE

## 2018-10-03 PROCEDURE — 90732 PPSV23 VACC 2 YRS+ SUBQ/IM: CPT | Mod: S$GLB,,, | Performed by: FAMILY MEDICINE

## 2018-10-03 PROCEDURE — 99999 PR PBB SHADOW E&M-EST. PATIENT-LVL IV: CPT | Mod: PBBFAC,,, | Performed by: FAMILY MEDICINE

## 2018-10-03 PROCEDURE — 87086 URINE CULTURE/COLONY COUNT: CPT

## 2018-10-03 PROCEDURE — 3008F BODY MASS INDEX DOCD: CPT | Mod: CPTII,S$GLB,, | Performed by: FAMILY MEDICINE

## 2018-10-03 PROCEDURE — 99214 OFFICE O/P EST MOD 30 MIN: CPT | Mod: 25,S$GLB,, | Performed by: FAMILY MEDICINE

## 2018-10-03 PROCEDURE — 90686 IIV4 VACC NO PRSV 0.5 ML IM: CPT | Mod: S$GLB,,, | Performed by: FAMILY MEDICINE

## 2018-10-03 PROCEDURE — 87077 CULTURE AEROBIC IDENTIFY: CPT

## 2018-10-03 PROCEDURE — 87088 URINE BACTERIA CULTURE: CPT

## 2018-10-03 PROCEDURE — 90471 IMMUNIZATION ADMIN: CPT | Mod: S$GLB,,, | Performed by: FAMILY MEDICINE

## 2018-10-03 PROCEDURE — 87186 SC STD MICRODIL/AGAR DIL: CPT

## 2018-10-03 PROCEDURE — 81000 URINALYSIS NONAUTO W/SCOPE: CPT | Mod: S$GLB,,, | Performed by: FAMILY MEDICINE

## 2018-10-03 RX ORDER — TRAZODONE HYDROCHLORIDE 50 MG/1
50 TABLET ORAL NIGHTLY
Qty: 90 TABLET | Refills: 3 | Status: SHIPPED | OUTPATIENT
Start: 2018-10-03 | End: 2019-10-03

## 2018-10-03 RX ORDER — GABAPENTIN 300 MG/1
300 CAPSULE ORAL NIGHTLY
Qty: 90 CAPSULE | Refills: 0 | Status: SHIPPED | OUTPATIENT
Start: 2018-10-03 | End: 2018-11-26 | Stop reason: SDUPTHER

## 2018-10-03 RX ORDER — TRAMADOL HYDROCHLORIDE 50 MG/1
TABLET ORAL
Refills: 2 | COMMUNITY
Start: 2018-09-20 | End: 2018-11-19 | Stop reason: SDUPTHER

## 2018-10-03 RX ORDER — TIZANIDINE HYDROCHLORIDE 2 MG/1
CAPSULE, GELATIN COATED ORAL
COMMUNITY
End: 2018-11-19

## 2018-10-03 RX ORDER — NITROFURANTOIN 25; 75 MG/1; MG/1
100 CAPSULE ORAL 2 TIMES DAILY
Qty: 10 CAPSULE | Refills: 0 | Status: SHIPPED | OUTPATIENT
Start: 2018-10-03 | End: 2018-10-08

## 2018-10-03 NOTE — PROGRESS NOTES
Subjective:   Patient ID: Jesi Nagy is a 58 y.o. female     Chief Complaint:Establish Care      Pt with burning on urination for the past 2-3 days. Notes pain constant and moderate in intensity. Pain does not radiate to back. Pain is not associated with fever. Nothing makes pain worse. Nothing makes pain better. Pt also with chronic neck pain on tramadol. Pt seen by Dr Peralta who prescribed 120 tablets tramadol a month with 1 refill. Pt amenable to other treatment options.      Review of Systems   Constitutional: Negative for chills and fever.   HENT: Negative for sore throat.    Eyes: Negative for visual disturbance.   Respiratory: Negative for shortness of breath.    Cardiovascular: Negative for chest pain.   Gastrointestinal: Negative for abdominal pain.   Endocrine: Negative for polyuria.   Genitourinary: Negative for dysuria.   Musculoskeletal: Negative for back pain.   Skin: Negative for color change.   Neurological: Negative for headaches.   Psychiatric/Behavioral: Negative for agitation and confusion.     History reviewed. No pertinent past medical history.  Objective:     Vitals:    10/03/18 1238   BP: (!) 185/98   Pulse: 84   Temp: 98.5 °F (36.9 °C)     Vitals:    10/03/18 1447   BP: (!) 140/82   Pulse:    Temp:        Body mass index is 23.13 kg/m².  Physical Exam   Constitutional: She is oriented to person, place, and time. She appears well-developed and well-nourished.   HENT:   Head: Normocephalic and atraumatic.   Eyes: EOM are normal. Pupils are equal, round, and reactive to light.   Neck: Normal range of motion. Neck supple.   Cardiovascular: Normal rate, regular rhythm, normal heart sounds and intact distal pulses.   Pulmonary/Chest: Effort normal and breath sounds normal.   Abdominal: Soft. She exhibits no distension.   Musculoskeletal: Normal range of motion.   Neurological: She is alert and oriented to person, place, and time.   Skin: Skin is warm and dry.   Psychiatric: She has a  normal mood and affect. Her behavior is normal. Judgment and thought content normal.   Nursing note and vitals reviewed.    Assessment:     1. Dysuria    2. Insomnia, unspecified type    3. Screening for breast cancer    4. Colon cancer screening    5. History of tobacco abuse    6. Toothache    7. Cervical disc disorder at C4-C5 level with radiculopathy      Plan:   Dysuria  -     POCT URINALYSIS  -     nitrofurantoin, macrocrystal-monohydrate, (MACROBID) 100 MG capsule; Take 1 capsule (100 mg total) by mouth 2 (two) times daily. for 5 days  Dispense: 10 capsule; Refill: 0  -     Urine culture  - pt with nitrate posittive, will start macorbid and f/u urine culture and adjust medicaiton as needed    Insomnia, unspecified type  -     traZODone (DESYREL) 50 MG tablet; Take 1 tablet (50 mg total) by mouth every evening.  Dispense: 90 tablet; Refill: 3  - stable trazadone    Screening for breast cancer  -     Mammo Digital Screening Bilateral With CAD; Future; Expected date: 10/03/2018    Colon cancer screening  -     Ambulatory referral to Gastroenterology    History of tobacco abuse    Toothache  -     benzocaine (ANBESOL, BENZOCAINE,) 10 % mucosal gel; Use as directed in the mouth or throat as needed for Pain.  Dispense: 9 g; Refill: 2    C4-C5 disc disease with radiculopathy  -     gabapentin (NEURONTIN) 300 MG capsule; Take 1 capsule (300 mg total) by mouth every evening.  Dispense: 90 capsule; Refill: 0  - start gabapentin at low dose and taper up to minimize side effects, attempt to wean of tramadol  -     (In Office Administered) Pneumococcal Polysaccharide Vaccine (23 Valent) (SQ/IM)        Discharge:   Time spent with patient: 30 minutes and over half of that time was spent on counseling an coordination of care.    Barriers to medications present (no )    Adverse reactions to current medications (no)    Over the counter medications reviewed (Yes)      Westley Tucker MD  10/03/2018

## 2018-10-06 LAB — BACTERIA UR CULT: NORMAL

## 2018-10-07 ENCOUNTER — PATIENT MESSAGE (OUTPATIENT)
Dept: FAMILY MEDICINE | Facility: CLINIC | Age: 58
End: 2018-10-07

## 2018-11-05 ENCOUNTER — TELEPHONE (OUTPATIENT)
Dept: GASTROENTEROLOGY | Facility: CLINIC | Age: 58
End: 2018-11-05

## 2018-11-05 ENCOUNTER — PATIENT OUTREACH (OUTPATIENT)
Dept: ADMINISTRATIVE | Facility: HOSPITAL | Age: 58
End: 2018-11-05

## 2018-11-05 NOTE — TELEPHONE ENCOUNTER
----- Message from Corrie Grimaldo RT sent at 11/2/2018  9:35 AM CDT -----  Contact: pt    pt , requesting to reschedule her missed nurse visit appt for the colon screening, thanks.

## 2018-11-13 ENCOUNTER — CLINICAL SUPPORT (OUTPATIENT)
Dept: OTHER | Facility: CLINIC | Age: 58
End: 2018-11-13
Payer: COMMERCIAL

## 2018-11-13 DIAGNOSIS — Z00.8 ENCOUNTER FOR OTHER GENERAL EXAMINATION: ICD-10-CM

## 2018-11-13 PROCEDURE — 99401 PREV MED CNSL INDIV APPRX 15: CPT | Mod: S$GLB,,, | Performed by: INTERNAL MEDICINE

## 2018-11-13 PROCEDURE — 82947 ASSAY GLUCOSE BLOOD QUANT: CPT | Mod: QW,S$GLB,, | Performed by: INTERNAL MEDICINE

## 2018-11-13 PROCEDURE — 80061 LIPID PANEL: CPT | Mod: QW,S$GLB,, | Performed by: INTERNAL MEDICINE

## 2018-11-14 ENCOUNTER — DOCUMENTATION ONLY (OUTPATIENT)
Dept: FAMILY MEDICINE | Facility: CLINIC | Age: 58
End: 2018-11-14

## 2018-11-14 ENCOUNTER — TELEPHONE (OUTPATIENT)
Dept: FAMILY MEDICINE | Facility: CLINIC | Age: 58
End: 2018-11-14

## 2018-11-14 ENCOUNTER — HOSPITAL ENCOUNTER (OUTPATIENT)
Dept: RADIOLOGY | Facility: CLINIC | Age: 58
Discharge: HOME | End: 2018-11-14
Attending: PHYSICIAN ASSISTANT
Payer: COMMERCIAL

## 2018-11-14 ENCOUNTER — PATIENT MESSAGE (OUTPATIENT)
Dept: FAMILY MEDICINE | Facility: CLINIC | Age: 58
End: 2018-11-14

## 2018-11-14 VITALS — BODY MASS INDEX: 24.6 KG/M2 | HEIGHT: 64 IN

## 2018-11-14 DIAGNOSIS — Z12.39 BREAST CANCER SCREENING: ICD-10-CM

## 2018-11-14 DIAGNOSIS — Z12.11 COLON CANCER SCREENING: Primary | ICD-10-CM

## 2018-11-14 DIAGNOSIS — R92.8 ABNORMAL MAMMOGRAM: Primary | ICD-10-CM

## 2018-11-14 LAB
GLUCOSE SERPL-MCNC: 96 MG/DL (ref 70–110)
HDLC SERPL-MCNC: 63 MG/DL
POC CHOLESTEROL, LDL (DOCK): 127 MG/DL
POC CHOLESTEROL, TOTAL: 239 MG/DL
TRIGL SERPL-MCNC: 242 MG/DL

## 2018-11-14 PROCEDURE — 77067 SCR MAMMO BI INCL CAD: CPT | Mod: TC,PO

## 2018-11-14 PROCEDURE — 77063 BREAST TOMOSYNTHESIS BI: CPT | Mod: 26,,, | Performed by: RADIOLOGY

## 2018-11-14 PROCEDURE — 77063 BREAST TOMOSYNTHESIS BI: CPT | Mod: TC,PO

## 2018-11-14 PROCEDURE — 77067 SCR MAMMO BI INCL CAD: CPT | Mod: 26,,, | Performed by: RADIOLOGY

## 2018-11-14 NOTE — TELEPHONE ENCOUNTER
----- Message from Evelina Sousa sent at 11/14/2018  4:09 PM CST -----  Patients had abnormal mammo this month and now needs diagnostic mammo to follow up with, the orders in epic need to be reentered as Digital diagnostic left mammo with espinoza and cad  Along with a US Left Breast Limited .  Any questions please message me or call .  Thanking you in advance.    Evelina Sousa    [phone]

## 2018-11-15 ENCOUNTER — HOSPITAL ENCOUNTER (OUTPATIENT)
Dept: RADIOLOGY | Facility: HOSPITAL | Age: 58
Discharge: HOME | End: 2018-11-15
Attending: INTERNAL MEDICINE
Payer: COMMERCIAL

## 2018-11-15 ENCOUNTER — TELEPHONE (OUTPATIENT)
Dept: PULMONOLOGY | Facility: CLINIC | Age: 58
End: 2018-11-15

## 2018-11-15 DIAGNOSIS — R04.2 HEMOPTYSIS: ICD-10-CM

## 2018-11-15 DIAGNOSIS — R93.89 ABNORMAL CT OF THE CHEST: ICD-10-CM

## 2018-11-15 DIAGNOSIS — J85.0 NECROTIZING PNEUMONIA: ICD-10-CM

## 2018-11-15 PROCEDURE — 71250 CT THORAX DX C-: CPT | Mod: 26,,, | Performed by: RADIOLOGY

## 2018-11-15 PROCEDURE — 71250 CT THORAX DX C-: CPT | Mod: TC

## 2018-11-15 NOTE — TELEPHONE ENCOUNTER
Patient viewed mammogram results via portal. Responded via e-mail. She was informed via e-mail that the orders were placed. Advised patient call hospital scheduling to arrange the date and time of appointment.

## 2018-11-15 NOTE — TELEPHONE ENCOUNTER
Pt notified     ----- Message from Matt Peralta MD sent at 11/15/2018 11:58 AM CST -----  Notify ct finally good, discuss at f/u - some residual scarring seen. Recommend f/u to review.

## 2018-11-16 ENCOUNTER — DOCUMENTATION ONLY (OUTPATIENT)
Dept: FAMILY MEDICINE | Facility: CLINIC | Age: 58
End: 2018-11-16

## 2018-11-16 NOTE — PROGRESS NOTES
Pre-Visit Chart Review  For Appointment Scheduled on 11/19/18    Health Maintenance Due   Topic Date Due    TETANUS VACCINE  07/11/1980    Colonoscopy  07/11/2012

## 2018-11-19 ENCOUNTER — OFFICE VISIT (OUTPATIENT)
Dept: FAMILY MEDICINE | Facility: CLINIC | Age: 58
End: 2018-11-19
Payer: COMMERCIAL

## 2018-11-19 VITALS
BODY MASS INDEX: 24.69 KG/M2 | SYSTOLIC BLOOD PRESSURE: 136 MMHG | HEIGHT: 64 IN | DIASTOLIC BLOOD PRESSURE: 88 MMHG | WEIGHT: 144.63 LBS | HEART RATE: 68 BPM | TEMPERATURE: 98 F

## 2018-11-19 DIAGNOSIS — Z12.11 COLON CANCER SCREENING: Primary | ICD-10-CM

## 2018-11-19 DIAGNOSIS — M50.121 CERVICAL DISC DISORDER AT C4-C5 LEVEL WITH RADICULOPATHY: ICD-10-CM

## 2018-11-19 PROCEDURE — 3008F BODY MASS INDEX DOCD: CPT | Mod: CPTII,S$GLB,, | Performed by: FAMILY MEDICINE

## 2018-11-19 PROCEDURE — 99999 PR PBB SHADOW E&M-EST. PATIENT-LVL III: CPT | Mod: PBBFAC,,, | Performed by: FAMILY MEDICINE

## 2018-11-19 PROCEDURE — 99214 OFFICE O/P EST MOD 30 MIN: CPT | Mod: S$GLB,,, | Performed by: FAMILY MEDICINE

## 2018-11-19 RX ORDER — TRAMADOL HYDROCHLORIDE 50 MG/1
100 TABLET ORAL EVERY 12 HOURS PRN
Qty: 120 TABLET | Refills: 2 | Status: SHIPPED | OUTPATIENT
Start: 2018-11-19 | End: 2019-02-17

## 2018-11-19 NOTE — PROGRESS NOTES
Subjective:   Patient ID: Jesi Nagy is a 58 y.o. female     Chief Complaint:Medication Refill and Follow-up      Patient with chronic neck pain.  Has been going on for multiple years.  Main worse with physical activity improves with tramadol medication.  Pain does not radiate.  Pain is moderate in intensity pain is intermittent.  patient notes improved sleeping now that she is taking gabapentin nightly.      Review of Systems   Constitutional: Negative for activity change.   Eyes: Negative for discharge.   Respiratory: Negative for wheezing.    Cardiovascular: Negative for chest pain and palpitations.   Gastrointestinal: Negative for constipation, diarrhea and vomiting.   Genitourinary: Negative for difficulty urinating and hematuria.   Musculoskeletal: Positive for neck pain.   Neurological: Negative for headaches.   Psychiatric/Behavioral: Negative for dysphoric mood.     No past medical history on file.  Objective:     Vitals:    11/19/18 1125   BP: 136/88   Pulse: 68   Temp: 97.8 °F (36.6 °C)     Body mass index is 24.82 kg/m².  Physical Exam   Constitutional: She is oriented to person, place, and time. She appears well-developed and well-nourished.   HENT:   Head: Normocephalic and atraumatic.   Eyes: EOM are normal. Pupils are equal, round, and reactive to light.   Neck: Normal range of motion. Neck supple.   Cardiovascular: Normal rate, regular rhythm, normal heart sounds and intact distal pulses.   Pulmonary/Chest: Effort normal and breath sounds normal.   Abdominal: Soft. She exhibits no distension.   Musculoskeletal: Normal range of motion.   Neurological: She is alert and oriented to person, place, and time.   Skin: Skin is warm and dry.   Psychiatric: She has a normal mood and affect. Her behavior is normal. Judgment and thought content normal.   Nursing note and vitals reviewed.    Assessment:     1. Colon cancer screening    2. Cervical disc disorder at C4-C5 level with radiculopathy       Plan:   Colon cancer screening  Patient provided fit kit already had previous visit    Cervical disc disorder at C4-C5 level with radiculopathy  -     traMADol (ULTRAM) 50 mg tablet; Take 2 tablets (100 mg total) by mouth every 12 (twelve) hours as needed for Pain.  Dispense: 120 tablet; Refill: 2  Pain improving with gabapentin at night the patient still has pain during the day.  Patient will try taking gabapentin during the day though aware of the side effects which can make her drowsy.  Patient will wait for day when she does not have a lot of work.    Discharge:   Time spent with patient: 15 minutes and over half of that time was spent on counseling an coordination of care.    Barriers to medications present (no )    Adverse reactions to current medications (no)    Over the counter medications reviewed (Yes)      Westley Tucker MD  11/19/2018    Portions of this note have been dictated with SONYA Mclaughlin

## 2018-11-20 ENCOUNTER — HOSPITAL ENCOUNTER (OUTPATIENT)
Dept: RADIOLOGY | Facility: HOSPITAL | Age: 58
Discharge: HOME | End: 2018-11-20
Attending: PHYSICIAN ASSISTANT
Payer: COMMERCIAL

## 2018-11-20 DIAGNOSIS — R92.8 ABNORMAL MAMMOGRAM: ICD-10-CM

## 2018-11-20 DIAGNOSIS — R92.8 ABNORMAL MAMMOGRAM OF LEFT BREAST: Primary | ICD-10-CM

## 2018-11-20 PROCEDURE — 77061 BREAST TOMOSYNTHESIS UNI: CPT | Mod: TC,LT

## 2018-11-20 PROCEDURE — 76642 ULTRASOUND BREAST LIMITED: CPT | Mod: TC,LT

## 2018-11-20 PROCEDURE — 76642 ULTRASOUND BREAST LIMITED: CPT | Mod: 26,LT,, | Performed by: RADIOLOGY

## 2018-11-20 PROCEDURE — 77065 DX MAMMO INCL CAD UNI: CPT | Mod: 26,LT,, | Performed by: RADIOLOGY

## 2018-11-20 PROCEDURE — 77065 DX MAMMO INCL CAD UNI: CPT | Mod: TC,LT

## 2018-11-20 PROCEDURE — 77061 BREAST TOMOSYNTHESIS UNI: CPT | Mod: 26,LT,, | Performed by: RADIOLOGY

## 2018-11-21 ENCOUNTER — TELEPHONE (OUTPATIENT)
Dept: PULMONOLOGY | Facility: CLINIC | Age: 58
End: 2018-11-21

## 2018-11-21 ENCOUNTER — OFFICE VISIT (OUTPATIENT)
Dept: PULMONOLOGY | Facility: CLINIC | Age: 58
End: 2018-11-21
Payer: COMMERCIAL

## 2018-11-21 VITALS
OXYGEN SATURATION: 97 % | BODY MASS INDEX: 24.88 KG/M2 | WEIGHT: 145.75 LBS | HEART RATE: 73 BPM | SYSTOLIC BLOOD PRESSURE: 133 MMHG | HEIGHT: 64 IN | DIASTOLIC BLOOD PRESSURE: 76 MMHG

## 2018-11-21 DIAGNOSIS — J85.1 ABSCESS OF RIGHT LUNG WITH PNEUMONIA, UNSPECIFIED PART OF LUNG: Primary | ICD-10-CM

## 2018-11-21 DIAGNOSIS — R93.89 ABNORMAL CT OF THE CHEST: ICD-10-CM

## 2018-11-21 DIAGNOSIS — J01.10 ACUTE FRONTAL SINUSITIS, RECURRENCE NOT SPECIFIED: ICD-10-CM

## 2018-11-21 PROCEDURE — 3008F BODY MASS INDEX DOCD: CPT | Mod: CPTII,S$GLB,, | Performed by: NURSE PRACTITIONER

## 2018-11-21 PROCEDURE — 99999 PR PBB SHADOW E&M-EST. PATIENT-LVL III: CPT | Mod: PBBFAC,,, | Performed by: INTERNAL MEDICINE

## 2018-11-21 PROCEDURE — 99213 OFFICE O/P EST LOW 20 MIN: CPT | Mod: S$GLB,,, | Performed by: NURSE PRACTITIONER

## 2018-11-21 RX ORDER — FLUTICASONE PROPIONATE 50 MCG
1 SPRAY, SUSPENSION (ML) NASAL DAILY
Qty: 16 G | Refills: 6 | Status: SHIPPED | OUTPATIENT
Start: 2018-11-21 | End: 2019-10-03 | Stop reason: SDUPTHER

## 2018-11-21 NOTE — TELEPHONE ENCOUNTER
Noted.      ----- Message from Renny Stein sent at 11/21/2018  8:25 AM CST -----  Contact: self   Patient will be about 10 minutes late, if any questions please call back at [phone] (home)

## 2018-11-21 NOTE — PROGRESS NOTES
2018    Jesi Nagy  Office Note    Chief Complaint   Patient presents with    Follow-up    Abnormal Ct Scan       HPI:18- No respiratory complaints, Neck pain with radiation down right arm consistent MRI scheduled in one week.   Relieved with tramadol prescribed by PCP.  Smoking hx: 30 pack years stopped 4 years ago.  Lower teeth extraction completed upper teeth scheduled for 2019.     Aug 20, 2018 no resp c/o.  Having increased neck pain and radicular pain down right arm.  Was on tramadol in past with withdrawal from 120/d when could not get.  Wishes to try lower dose and more cautious use.  Uses peridex.  Got c diff- off few days- still diarrhea.     July 10, 2018pt still cough and foul mucous but feels better.  On clindamycin.  Drenching night sweats better than pta.  Reports yeast.  Needs dental work.  Has limited resources - needs dental work, uses peridex.          The chief compliant  problem is new to me  PFSH:  History reviewed. No pertinent past medical history.      Past Surgical History:   Procedure Laterality Date    AUGMENTATION OF BREAST Bilateral     baker's cyst removal      BREAST SURGERY      HYSTERECTOMY      KNEE SURGERY Bilateral     laproscopic surgery    SHOULDER SURGERY Left     laproscopic     Social History     Tobacco Use    Smoking status: Former Smoker     Packs/day: 1.00     Years: 30.00     Pack years: 30.00     Types: Cigarettes     Last attempt to quit:      Years since quittin.8    Smokeless tobacco: Never Used    Tobacco comment: Patient now vapes   Substance Use Topics    Alcohol use: Yes     Alcohol/week: 4.2 oz     Types: 7 Glasses of wine per week     Comment: glass of wine daily    Drug use: No     Family History   Problem Relation Age of Onset    Rheum arthritis Mother     Heart disease Father     Parkinsonism Father      Review of patient's allergies indicates:  No Known Allergies  I have reviewed past medical, family, and social  "history. I have reviewed previous nurse notes.    Performance Status:The patient's activity level is no limits with regular activity.          Review of Systems   Constitutional: Negative for activity change, appetite change, chills, diaphoresis, fatigue, fever and unexpected weight change.   HENT: Negative for dental problem, sneezing, sore throat, trouble swallowing and voice change.  Positive for postnasal drip, rhinorrhea, sinus pressure, sinus pain,   Respiratory: Negative for apnea, cough, chest tightness, shortness of breath, wheezing and stridor.    Cardiovascular: Negative for chest pain, palpitations and leg swelling.   Gastrointestinal: Negative for abdominal distention, abdominal pain, constipation and nausea.   Musculoskeletal: Negative for gait problem. Positive for myalgias and neck pain.   Skin: Negative for color change and pallor.   Allergic/Immunologic: Negative for environmental allergies and food allergies.   Neurological: Negative for dizziness, speech difficulty, weakness, light-headedness, numbness and headaches.   Hematological: Negative for adenopathy. Does not bruise/bleed easily.   Psychiatric/Behavioral: Negative for dysphoric mood and sleep disturbance. The patient is not nervous/anxious.           Exam:Comprehensive exam done. /76 (BP Location: Left arm, Patient Position: Sitting)   Pulse 73   Ht 5' 4" (1.626 m)   Wt 66.1 kg (145 lb 11.6 oz)   SpO2 97% Comment: on room air  BMI 25.01 kg/m²   Exam included Vitals as listed  Constitutional: He is oriented to person, place, and time. He appears well-developed. No distress.   Nose: Nose normal.   Mouth/Throat: Uvula is midline, oropharynx is clear and moist and mucous membranes are normal. No dental caries. No oropharyngeal exudate, posterior oropharyngeal edema, posterior oropharyngeal erythema or tonsillar abscesses.  Mallapatti (M) score 2  Eyes: Pupils are equal, round, and reactive to light.   Neck: No JVD present. No " thyromegaly present.   Cardiovascular: Normal rate, regular rhythm and normal heart sounds. Exam reveals no gallop and no friction rub.   No murmur heard.  Pulmonary/Chest: Effort normal and breath sounds normal. No accessory muscle usage or stridor. No apnea and no tachypnea. No respiratory distress, decreased breath sounds, wheezes, rhonchi, rales, or tenderness.   Abdominal: Soft. He exhibits no mass. There is no tenderness. No hepatosplenomegaly, hernias and normoactive bowel sounds  Musculoskeletal: Normal range of motion. exhibits no edema.   Lymphadenopathy:     He has no cervical adenopathy.     He has no axillary adenopathy.   Neurological:  alert and oriented to person, place, and time. not disoriented.   Skin: Skin is warm and dry. Capillary refill takes less 2 sec. No cyanosis or erythema. No pallor. Nails show no clubbing.   Psychiatric: normal mood and affect. behavior is normal. Judgment and thought content normal.       Radiographs (ct chest and cxr) reviewed: results reviewed   CT CHEST WITHOUT CONTRAST   Impression   Resolution of right lung consolidation, with residual parenchymal scarring or atelectasis accompanied by small areas of cavitation and/or bronchiectasis.  Electronically signed by: Paul Quintana MD  Date: 11/15/2018  Time: 09:26     XR CHEST PA AND LATERAL   FINDINGS:  The mediastinal and cardiac size and contours are normal.  There remains a band of atelectasis and scar in the right upper lobe posteriorly.  This is slightly decreased from the prior study.  No new mass or infiltrate is seen.  No pneumothorax is noted.      Impression       Slowly resolving atelectasis and scar in the right upper lobe.      Electronically signed by: Hua Alvarez MD  Date: 08/17/2018  Time: 14:06       Labs reviewed       Lab Results   Component Value Date    WBC 6.72 07/20/2018    RBC 3.90 (L) 07/20/2018    HGB 12.7 07/20/2018    HCT 39.7 07/20/2018     (H) 07/20/2018    MCH 32.6 (H)  07/20/2018    MCHC 32.0 07/20/2018    RDW 13.7 07/20/2018     07/20/2018    MPV 11.1 07/20/2018    GRAN 3.1 07/20/2018    GRAN 46.7 07/20/2018    LYMPH 2.8 07/20/2018    LYMPH 41.2 07/20/2018    MONO 0.6 07/20/2018    MONO 9.4 07/20/2018    EOS 0.1 07/20/2018    BASO 0.05 07/20/2018    EOSINOPHIL 1.9 07/20/2018    BASOPHIL 0.7 07/20/2018       PFT not available          Plan:  Clinical impression is resonably certain and repeated evaluation prn +/- follow up will be needed as below.    Jesi was seen today for follow-up and abnormal ct scan.    Diagnoses and all orders for this visit:    Abscess of right lung with pneumonia, unspecified part of lung  -     X-Ray Chest PA And Lateral; Future    Abnormal CT of the chest  -     X-Ray Chest PA And Lateral; Future    Acute frontal sinusitis, recurrence not specified  -     fluticasone (FLONASE) 50 mcg/actuation nasal spray; 1 spray (50 mcg total) by Each Nare route once daily.        Follow-up in about 6 months (around 5/21/2019), or if symptoms worsen or fail to improve.    Discussed with patient above for education the following:      Patient Instructions   Chest X-ray in 6 months

## 2018-11-26 RX ORDER — GABAPENTIN 300 MG/1
300 CAPSULE ORAL NIGHTLY
Qty: 90 CAPSULE | Refills: 1 | Status: SHIPPED | OUTPATIENT
Start: 2018-11-26 | End: 2019-01-03

## 2018-11-27 ENCOUNTER — HOSPITAL ENCOUNTER (OUTPATIENT)
Dept: RADIOLOGY | Facility: HOSPITAL | Age: 58
Discharge: HOME | End: 2018-11-27
Attending: INTERNAL MEDICINE
Payer: COMMERCIAL

## 2018-11-27 DIAGNOSIS — M79.2 RADICULAR PAIN IN RIGHT ARM: ICD-10-CM

## 2018-11-27 DIAGNOSIS — M54.2 NECK PAIN: ICD-10-CM

## 2018-11-27 DIAGNOSIS — M47.812 CERVICAL SPINE ARTHRITIS: ICD-10-CM

## 2018-11-27 DIAGNOSIS — M62.521 ATROPHY OF MUSCLE OF RIGHT UPPER ARM: ICD-10-CM

## 2018-11-27 PROCEDURE — 72141 MRI NECK SPINE W/O DYE: CPT | Mod: 26,,, | Performed by: RADIOLOGY

## 2018-11-27 PROCEDURE — 72141 MRI NECK SPINE W/O DYE: CPT | Mod: TC

## 2018-11-28 ENCOUNTER — TELEPHONE (OUTPATIENT)
Dept: FAMILY MEDICINE | Facility: CLINIC | Age: 58
End: 2018-11-28

## 2018-11-28 ENCOUNTER — OFFICE VISIT (OUTPATIENT)
Dept: PULMONOLOGY | Facility: CLINIC | Age: 58
End: 2018-11-28
Payer: COMMERCIAL

## 2018-11-28 VITALS
WEIGHT: 151.25 LBS | HEART RATE: 71 BPM | SYSTOLIC BLOOD PRESSURE: 152 MMHG | HEIGHT: 64 IN | BODY MASS INDEX: 25.82 KG/M2 | OXYGEN SATURATION: 96 % | DIASTOLIC BLOOD PRESSURE: 98 MMHG

## 2018-11-28 DIAGNOSIS — M54.2 NECK PAIN, BILATERAL POSTERIOR: Primary | ICD-10-CM

## 2018-11-28 PROCEDURE — 3008F BODY MASS INDEX DOCD: CPT | Mod: CPTII,S$GLB,, | Performed by: INTERNAL MEDICINE

## 2018-11-28 PROCEDURE — 99213 OFFICE O/P EST LOW 20 MIN: CPT | Mod: S$GLB,,, | Performed by: INTERNAL MEDICINE

## 2018-11-28 PROCEDURE — 99999 PR PBB SHADOW E&M-EST. PATIENT-LVL IV: CPT | Mod: PBBFAC,,, | Performed by: NURSE PRACTITIONER

## 2018-11-28 NOTE — PROGRESS NOTES
2018    Jesi Nagy  Office Note    Chief Complaint   Patient presents with    Results     MRI       HPI:    - having severe chronic neck pain.  Mri abn      18- No respiratory complaints, Neck pain with radiation down right arm consistent MRI scheduled in one week.   Relieved with tramadol prescribed by PCP.  Smoking hx: 30 pack years stopped 4 years ago.  Lower teeth extraction completed upper teeth scheduled for 2019.     Aug 20, 2018 no resp c/o.  Having increased neck pain and radicular pain down right arm.  Was on tramadol in past with withdrawal from 120/d when could not get.  Wishes to try lower dose and more cautious use.  Uses peridex.  Got c diff- off few days- still diarrhea.     July 10, 2018pt still cough and foul mucous but feels better.  On clindamycin.  Drenching night sweats better than pta.  Reports yeast.  Needs dental work.  Has limited resources - needs dental work, uses peridex.          The chief compliant  problem is new to me  PFSH:  History reviewed. No pertinent past medical history.      Past Surgical History:   Procedure Laterality Date    AUGMENTATION OF BREAST Bilateral     baker's cyst removal      BREAST SURGERY      HYSTERECTOMY      KNEE SURGERY Bilateral     laproscopic surgery    SHOULDER SURGERY Left     laproscopic     Social History     Tobacco Use    Smoking status: Former Smoker     Packs/day: 1.00     Years: 30.00     Pack years: 30.00     Types: Cigarettes     Last attempt to quit:      Years since quittin.9    Smokeless tobacco: Never Used    Tobacco comment: Patient now vapes   Substance Use Topics    Alcohol use: Yes     Alcohol/week: 4.2 oz     Types: 7 Glasses of wine per week     Comment: glass of wine daily    Drug use: No     Family History   Problem Relation Age of Onset    Rheum arthritis Mother     Heart disease Father     Parkinsonism Father      Review of patient's allergies indicates:  No Known  "Allergies  I have reviewed past medical, family, and social history. I have reviewed previous nurse notes.    Performance Status:The patient's activity level is no limits with regular activity.          Review of Systems   Constitutional: Negative for activity change, appetite change, chills, diaphoresis, fatigue, fever and unexpected weight change.   HENT: Negative for dental problem, sneezing, sore throat, trouble swallowing and voice change.  Positive for postnasal drip, rhinorrhea, sinus pressure, sinus pain,   Respiratory: Negative for apnea, cough, chest tightness, shortness of breath, wheezing and stridor.    Cardiovascular: Negative for chest pain, palpitations and leg swelling.   Gastrointestinal: Negative for abdominal distention, abdominal pain, constipation and nausea.   Musculoskeletal: Negative for gait problem. Positive for myalgias and neck pain.   Skin: Negative for color change and pallor.   Allergic/Immunologic: Negative for environmental allergies and food allergies.   Neurological: Negative for dizziness, speech difficulty, weakness, light-headedness, numbness and headaches.   Hematological: Negative for adenopathy. Does not bruise/bleed easily.   Psychiatric/Behavioral: Negative for dysphoric mood and sleep disturbance. The patient is not nervous/anxious.           Exam:Comprehensive exam done. BP (!) 152/98 (BP Location: Left arm, Patient Position: Sitting)   Pulse 71   Ht 5' 4" (1.626 m)   Wt 68.6 kg (151 lb 3.8 oz)   SpO2 96% Comment: on room air  BMI 25.96 kg/m²   Exam included Vitals as listed  Constitutional: He is oriented to person, place, and time. He appears well-developed. No distress.   Nose: Nose normal.   Mouth/Throat: Uvula is midline, oropharynx is clear and moist and mucous membranes are normal. No dental caries. No oropharyngeal exudate, posterior oropharyngeal edema, posterior oropharyngeal erythema or tonsillar abscesses.  Mallapatti (M) score 2  Eyes: Pupils are equal, " round, and reactive to light.   Neck: No JVD present. No thyromegaly present.   Cardiovascular: Normal rate, regular rhythm and normal heart sounds. Exam reveals no gallop and no friction rub.   No murmur heard.  Pulmonary/Chest: Effort normal and breath sounds normal. No accessory muscle usage or stridor. No apnea and no tachypnea. No respiratory distress, decreased breath sounds, wheezes, rhonchi, rales, or tenderness.   Abdominal: Soft. He exhibits no mass. There is no tenderness. No hepatosplenomegaly, hernias and normoactive bowel sounds  Musculoskeletal: Normal range of motion. exhibits no edema.   Lymphadenopathy:     He has no cervical adenopathy.     He has no axillary adenopathy.   Neurological:  alert and oriented to person, place, and time. not disoriented.   Skin: Skin is warm and dry. Capillary refill takes less 2 sec. No cyanosis or erythema. No pallor. Nails show no clubbing.   Psychiatric: normal mood and affect. behavior is normal. Judgment and thought content normal.       Radiographs (ct chest and cxr) reviewed: results reviewed   CT CHEST WITHOUT CONTRAST   Impression   Resolution of right lung consolidation, with residual parenchymal scarring or atelectasis accompanied by small areas of cavitation and/or bronchiectasis.  Electronically signed by: Paul Quintana MD  Date: 11/15/2018  Time: 09:26     XR CHEST PA AND LATERAL   FINDINGS:  The mediastinal and cardiac size and contours are normal.  There remains a band of atelectasis and scar in the right upper lobe posteriorly.  This is slightly decreased from the prior study.  No new mass or infiltrate is seen.  No pneumothorax is noted.      Impression       Slowly resolving atelectasis and scar in the right upper lobe.      Electronically signed by: Hua Alvarez MD  Date: 08/17/2018  Time: 14:06       Labs reviewed       Lab Results   Component Value Date    WBC 6.72 07/20/2018    RBC 3.90 (L) 07/20/2018    HGB 12.7 07/20/2018    HCT 39.7  07/20/2018     (H) 07/20/2018    MCH 32.6 (H) 07/20/2018    MCHC 32.0 07/20/2018    RDW 13.7 07/20/2018     07/20/2018    MPV 11.1 07/20/2018    GRAN 3.1 07/20/2018    GRAN 46.7 07/20/2018    LYMPH 2.8 07/20/2018    LYMPH 41.2 07/20/2018    MONO 0.6 07/20/2018    MONO 9.4 07/20/2018    EOS 0.1 07/20/2018    BASO 0.05 07/20/2018    EOSINOPHIL 1.9 07/20/2018    BASOPHIL 0.7 07/20/2018       PFT not available          Plan:  Clinical impression is resonably certain and repeated evaluation prn +/- follow up will be needed as below.    Jesi was seen today for results.    Diagnoses and all orders for this visit:    Neck pain, bilateral posterior  -     Ambulatory consult to Neurosurgery        Follow-up if symptoms worsen or fail to improve.    Discussed with patient above for education the following:      Patient Instructions   Mri shows some problems - need neurosurg eval to determine optimal approach.  You had similar problems evaluated with Dr Dejesus past.

## 2018-11-28 NOTE — PATIENT INSTRUCTIONS
Mri shows some problems - need neurosurg eval to determine optimal approach.  You had similar problems evaluated with Dr Dejesus past.

## 2018-11-28 NOTE — TELEPHONE ENCOUNTER
----- Message from Ebony Srinivasan LPN sent at 11/28/2018 12:30 PM CST -----  Regarding: FW: neurosurgeon  Good Afternoon,   Patient's PCP listed as Dr. Tucker. I will forward your message to Dr. Tucker's staff.     ----- Message -----  From: Tracey Chapman LPN  Sent: 11/28/2018  11:58 AM  To: [facility] Staff  Subject: neurosurgeon                                     Hi,    Dr Peralta is trying to refer this pt to a neurosurgeon. He asked me to message Primary care and see who they usually refer to?  Do you have recommendations?

## 2018-11-29 ENCOUNTER — PATIENT MESSAGE (OUTPATIENT)
Dept: PULMONOLOGY | Facility: CLINIC | Age: 58
End: 2018-11-29

## 2018-11-30 ENCOUNTER — PATIENT MESSAGE (OUTPATIENT)
Dept: PULMONOLOGY | Facility: CLINIC | Age: 58
End: 2018-11-30

## 2018-12-18 ENCOUNTER — INITIAL CONSULT (OUTPATIENT)
Dept: NEUROSURGERY | Facility: CLINIC | Age: 58
End: 2018-12-18
Payer: COMMERCIAL

## 2018-12-18 VITALS — HEIGHT: 64 IN | WEIGHT: 146.81 LBS | RESPIRATION RATE: 20 BRPM | BODY MASS INDEX: 25.06 KG/M2

## 2018-12-18 DIAGNOSIS — M50.121 CERVICAL DISC DISORDER AT C4-C5 LEVEL WITH RADICULOPATHY: Primary | ICD-10-CM

## 2018-12-18 DIAGNOSIS — M50.30 DEGENERATION OF CERVICAL DISC WITHOUT MYELOPATHY: ICD-10-CM

## 2018-12-18 DIAGNOSIS — M79.2 RADICULAR PAIN IN RIGHT ARM: Primary | ICD-10-CM

## 2018-12-18 DIAGNOSIS — M54.2 NECK PAIN: Primary | ICD-10-CM

## 2018-12-18 PROCEDURE — 99999 PR PBB SHADOW E&M-EST. PATIENT-LVL III: CPT | Mod: PBBFAC,,, | Performed by: NEUROLOGICAL SURGERY

## 2018-12-18 PROCEDURE — 99204 OFFICE O/P NEW MOD 45 MIN: CPT | Mod: S$GLB,,, | Performed by: NEUROLOGICAL SURGERY

## 2018-12-18 PROCEDURE — 3008F BODY MASS INDEX DOCD: CPT | Mod: CPTII,S$GLB,, | Performed by: NEUROLOGICAL SURGERY

## 2018-12-18 NOTE — LETTER
December 18, 2018      Matt Peralta MD  [address]  [address]  [place] [address]           [facility]  [address] [facility] [address] [place] [address]  Phone: [phone]  Fax: [phone]          Patient: Jesi Nagy   MR Number: [id number]   YOB: 1960   Date of Visit: 12/18/2018       Dear Dr. Matt Peralta:    Thank you for referring Jesi Nagy to me for evaluation. Attached you will find relevant portions of my assessment and plan of care.    If you have questions, please do not hesitate to call me. I look forward to following Jesi Nagy along with you.    Sincerely,    Marco A Cooley MD    Enclosure  CC:  No Recipients    If you would like to receive this communication electronically, please contact [email or web address] or [phone] to request more information on [employer] access.    For providers and/or their staff who would like to refer a patient to [facility], please contact us through our one-stop-shop provider referral line, [facility], at [phone].    If you feel you have received this communication in error or would no longer like to receive these types of communications, please e-mail [email or web address]

## 2018-12-18 NOTE — PROGRESS NOTES
Neurosurgery History and Physical    Patient ID: Jesi Nagy is a 58 y.o. female.    Chief Complaint   Patient presents with    Cervical Spine Pain (C-spine)     pt states pain in neck began years ago and has progressively gotten worse; not accident related; pt states numbness and tingling in BUE and fingers/hands; pain is from base of skull into shoulders and down BUE; looking down makes pain worse; denies bowel and bladder issues; Oswestry 16% PHQ 0         Review of Systems   Constitutional: Negative.    HENT: Negative.    Eyes: Negative.    Respiratory: Negative.    Cardiovascular: Negative.    Gastrointestinal: Negative.    Endocrine: Negative.    Genitourinary: Negative.    Musculoskeletal: Positive for neck pain and neck stiffness.   Skin: Negative.    Allergic/Immunologic: Negative.    Neurological: Positive for tremors and numbness. Negative for weakness.   Hematological: Negative.    Psychiatric/Behavioral: Negative.        History reviewed. No pertinent past medical history.  Social History     Socioeconomic History    Marital status:      Spouse name: Not on file    Number of children: Not on file    Years of education: Not on file    Highest education level: Not on file   Social Needs    Financial resource strain: Not on file    Food insecurity - worry: Not on file    Food insecurity - inability: Not on file    Transportation needs - medical: Not on file    Transportation needs - non-medical: Not on file   Occupational History    Not on file   Tobacco Use    Smoking status: Former Smoker     Packs/day: 1.00     Years: 30.00     Pack years: 30.00     Types: Cigarettes     Last attempt to quit:      Years since quittin.9    Smokeless tobacco: Never Used    Tobacco comment: Patient now vapes   Substance and Sexual Activity    Alcohol use: Yes     Alcohol/week: 4.2 oz     Types: 7 Glasses of wine per week     Comment: glass of wine daily    Drug use: No    Sexual  "activity: Yes     Birth control/protection: See Surgical Hx   Other Topics Concern    Not on file   Social History Narrative    Not on file     Family History   Problem Relation Age of Onset    Rheum arthritis Mother     Heart disease Father     Parkinsonism Father      Review of patient's allergies indicates:  No Known Allergies    Current Outpatient Medications:     benzocaine (ANBESOL, BENZOCAINE,) 10 % mucosal gel, Use as directed in the mouth or throat as needed for Pain., Disp: 9 g, Rfl: 2    chlorhexidine (PERIDEX) 0.12 % solution, Use as directed 15 mLs in the mouth or throat 2 (two) times daily., Disp: 473 mL, Rfl: 5    fluticasone (FLONASE) 50 mcg/actuation nasal spray, 1 spray (50 mcg total) by Each Nare route once daily., Disp: 16 g, Rfl: 6    gabapentin (NEURONTIN) 300 MG capsule, Take 1 capsule (300 mg total) by mouth every evening., Disp: 90 capsule, Rfl: 1    traMADol (ULTRAM) 50 mg tablet, Take 2 tablets (100 mg total) by mouth every 12 (twelve) hours as needed for Pain., Disp: 120 tablet, Rfl: 2    traZODone (DESYREL) 50 MG tablet, Take 1 tablet (50 mg total) by mouth every evening., Disp: 90 tablet, Rfl: 3  Resp. rate 20, height 5' 4" (1.626 m), weight 66.6 kg (146 lb 13.2 oz).      Neurologic Exam     Mental Status   Oriented to person, place, and time.   Attention: normal. Concentration: normal.   Speech: speech is normal   Level of consciousness: alert  Knowledge: good.     Cranial Nerves     CN II   Visual acuity: normal    CN III, IV, VI   Pupils are equal, round, and reactive to light.  Extraocular motions are normal.     CN V   Facial sensation intact.     CN VII   Facial expression full, symmetric.     CN VIII   Hearing: intact    CN IX, X   Palate: symmetric    CN XI   CN XI normal.     CN XII   CN XII normal.     Motor Exam   Muscle bulk: normal  Overall muscle tone: normal  Right arm pronator drift: absent  Left arm pronator drift: absent    Strength   Right deltoid: " 5/5  Left deltoid: 5/5  Right biceps: 5/5  Left biceps: 5/5  Right triceps: 5/5  Left triceps: 5/5  Right wrist flexion: 5/5  Left wrist flexion: 5/5  Right wrist extension: 5/5  Left wrist extension: 5/5  Right interossei: 5/5  Left interossei: 5/5  Right iliopsoas: 5/5  Left iliopsoas: 5/5  Right quadriceps: 5/5  Left quadriceps: 5/5  Right hamstrin/5  Left hamstrin/5  Right anterior tibial: 5/5  Left anterior tibial: 5/5  Right posterior tibial: 5/5  Left posterior tibial: 5/5  Right peroneal: 5/5  Left peroneal: 5/5  Right gastroc: 5/5  Left gastroc: 5/5    Sensory Exam   Light touch normal.     Gait, Coordination, and Reflexes     Gait  Gait: normal    Coordination   Romberg: negative  Finger to nose coordination: normal    Tremor   Resting tremor: absent  Intention tremor: present (high frequency)    Reflexes   Right brachioradialis: 3+  Left brachioradialis: 3+  Right biceps: 3+  Left biceps: 3+  Right triceps: 3+  Left triceps: 3+  Right patellar: 3+  Left patellar: 3+  Right achilles: 0  Left achilles: 0  Right plantar: normal  Left plantar: normal  Right Holt: absent  Left Holt: absent  Right ankle clonus: absent  Left ankle clonus: absent      Physical Exam   Constitutional: She is oriented to person, place, and time.   Eyes: EOM are normal. Pupils are equal, round, and reactive to light.   Neurological: She is oriented to person, place, and time. She has a normal Finger-Nose-Finger Test and a normal Romberg Test. Gait normal.   Reflex Scores:       Tricep reflexes are 3+ on the right side and 3+ on the left side.       Bicep reflexes are 3+ on the right side and 3+ on the left side.       Brachioradialis reflexes are 3+ on the right side and 3+ on the left side.       Patellar reflexes are 3+ on the right side and 3+ on the left side.       Achilles reflexes are 0 on the right side and 0 on the left side.  Psychiatric: Her speech is normal.       Vital Signs  Resp: 20  Pain Score:   5  Pain  "Loc: Neck  Height and Weight  Height: 5' 4" (162.6 cm)  Weight: 66.6 kg (146 lb 13.2 oz)  BSA (Calculated - sq m): 1.73 sq meters  BMI (Calculated): 25.3  Weight in (lb) to have BMI = 25: 145.3]    Provider dictation:  I reviewed the imaging. She has degenerative disc disease at C4-C5, C5-C6 and C6-C7. This results in bilateral foraminal stenosis at each level. There is no spinal cord compression.     She endorses a multi-year history of axial neck pain greater than arm pain but with radiation to both arms and hands to all finger tips except the 5th finger. The pain is dynamic and associated with neck turning or flexing. The laterality of the radiation is variable. She has tried PT and NELLY over the years with only short term relief.    On exam, she has no weakness or dermatomal numbness. She is diffusely hyperreflexic without overt myelopathy.     I will order an EMG/NCT of the upper extremities to better characterize her arm pain and a flexion-extension XR to look at her dynamic spinal curvature and for any evidence of dynamic instability. F/U after tests.     Visit Diagnosis:  Neck pain    Degeneration of cervical disc without myelopathy      "

## 2018-12-28 ENCOUNTER — HOSPITAL ENCOUNTER (OUTPATIENT)
Dept: RADIOLOGY | Facility: CLINIC | Age: 58
Discharge: HOME | End: 2018-12-28
Attending: STUDENT IN AN ORGANIZED HEALTH CARE EDUCATION/TRAINING PROGRAM
Payer: COMMERCIAL

## 2018-12-28 DIAGNOSIS — M50.121 CERVICAL DISC DISORDER AT C4-C5 LEVEL WITH RADICULOPATHY: ICD-10-CM

## 2018-12-28 PROCEDURE — 72050 X-RAY EXAM NECK SPINE 4/5VWS: CPT | Mod: TC,FY,PO

## 2018-12-28 PROCEDURE — 72050 X-RAY EXAM NECK SPINE 4/5VWS: CPT | Mod: 26,,, | Performed by: RADIOLOGY

## 2018-12-29 ENCOUNTER — HOSPITAL ENCOUNTER (EMERGENCY)
Facility: HOSPITAL | Age: 58
Discharge: HOME | End: 2018-12-29
Attending: EMERGENCY MEDICINE
Payer: COMMERCIAL

## 2018-12-29 VITALS
OXYGEN SATURATION: 100 % | HEIGHT: 65 IN | RESPIRATION RATE: 20 BRPM | SYSTOLIC BLOOD PRESSURE: 147 MMHG | WEIGHT: 150 LBS | BODY MASS INDEX: 24.99 KG/M2 | HEART RATE: 84 BPM | DIASTOLIC BLOOD PRESSURE: 81 MMHG | TEMPERATURE: 98 F

## 2018-12-29 DIAGNOSIS — S20.219A CONTUSION OF CHEST WALL, UNSPECIFIED LATERALITY, INITIAL ENCOUNTER: ICD-10-CM
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
## 2018-12-29 DIAGNOSIS — S13.9XXA NECK SPRAIN, INITIAL ENCOUNTER: Primary | ICD-10-CM

## 2018-12-29 DIAGNOSIS — S40.012A CONTUSION OF LEFT SHOULDER, INITIAL ENCOUNTER: ICD-10-CM

## 2018-12-29 DIAGNOSIS — I10 ACCELERATED HYPERTENSION: ICD-10-CM

## 2018-12-29 PROCEDURE — 93005 ELECTROCARDIOGRAM TRACING: CPT

## 2018-12-29 PROCEDURE — 25000003 PHARM REV CODE 250: Performed by: EMERGENCY MEDICINE

## 2018-12-29 PROCEDURE — 99285 EMERGENCY DEPT VISIT HI MDM: CPT | Mod: 25

## 2018-12-29 RX ORDER — ACETAMINOPHEN 325 MG/1
325 TABLET ORAL
Status: COMPLETED | OUTPATIENT
Start: 2018-12-29 | End: 2018-12-29

## 2018-12-29 RX ORDER — DICLOFENAC SODIUM 50 MG/1
50 TABLET, DELAYED RELEASE ORAL 3 TIMES DAILY
Qty: 15 TABLET | Refills: 0 | Status: SHIPPED | OUTPATIENT
Start: 2018-12-29 | End: 2018-12-29 | Stop reason: SDUPTHER

## 2018-12-29 RX ORDER — CLONIDINE HYDROCHLORIDE 0.2 MG/1
0.2 TABLET ORAL
Status: COMPLETED | OUTPATIENT
Start: 2018-12-29 | End: 2018-12-29

## 2018-12-29 RX ORDER — BUTALBITAL, ACETAMINOPHEN AND CAFFEINE 50; 325; 40 MG/1; MG/1; MG/1
1 TABLET ORAL
Status: COMPLETED | OUTPATIENT
Start: 2018-12-29 | End: 2018-12-29

## 2018-12-29 RX ORDER — DICLOFENAC SODIUM 50 MG/1
50 TABLET, DELAYED RELEASE ORAL 3 TIMES DAILY
Qty: 15 TABLET | Refills: 0 | Status: SHIPPED | OUTPATIENT
Start: 2018-12-29 | End: 2019-01-03

## 2018-12-29 RX ADMIN — CLONIDINE HYDROCHLORIDE 0.2 MG: 0.2 TABLET ORAL at 04:12

## 2018-12-29 RX ADMIN — ACETAMINOPHEN 325 MG: 325 TABLET, FILM COATED ORAL at 04:12

## 2018-12-29 RX ADMIN — BUTALBITAL, ACETAMINOPHEN, AND CAFFEINE 1 TABLET: 50; 325; 40 TABLET ORAL at 04:12
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
## 2018-12-30 ENCOUNTER — PATIENT MESSAGE (OUTPATIENT)
Dept: FAMILY MEDICINE | Facility: CLINIC | Age: 58
End: 2018-12-30
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
## 2019-01-03 ENCOUNTER — OFFICE VISIT (OUTPATIENT)
Dept: FAMILY MEDICINE | Facility: CLINIC | Age: 59
End: 2019-01-03
Payer: COMMERCIAL

## 2019-01-03 VITALS
SYSTOLIC BLOOD PRESSURE: 148 MMHG | WEIGHT: 143.75 LBS | DIASTOLIC BLOOD PRESSURE: 94 MMHG | BODY MASS INDEX: 23.92 KG/M2 | HEART RATE: 86 BPM | TEMPERATURE: 98 F

## 2019-01-03 DIAGNOSIS — I10 ESSENTIAL HYPERTENSION: ICD-10-CM

## 2019-01-03 DIAGNOSIS — M54.2 NECK PAIN: Primary | ICD-10-CM

## 2019-01-03 PROCEDURE — 3077F SYST BP >= 140 MM HG: CPT | Mod: CPTII,S$GLB,, | Performed by: NURSE PRACTITIONER

## 2019-01-03 PROCEDURE — 3080F PR MOST RECENT DIASTOLIC BLOOD PRESSURE >= 90 MM HG: ICD-10-PCS | Mod: CPTII,S$GLB,, | Performed by: NURSE PRACTITIONER

## 2019-01-03 PROCEDURE — 3080F DIAST BP >= 90 MM HG: CPT | Mod: CPTII,S$GLB,, | Performed by: NURSE PRACTITIONER

## 2019-01-03 PROCEDURE — 99214 PR OFFICE/OUTPT VISIT, EST, LEVL IV, 30-39 MIN: ICD-10-PCS | Mod: S$GLB,,, | Performed by: NURSE PRACTITIONER

## 2019-01-03 PROCEDURE — 99214 OFFICE O/P EST MOD 30 MIN: CPT | Mod: S$GLB,,, | Performed by: NURSE PRACTITIONER

## 2019-01-03 PROCEDURE — 99999 PR PBB SHADOW E&M-EST. PATIENT-LVL IV: ICD-10-PCS | Mod: PBBFAC,,, | Performed by: NURSE PRACTITIONER

## 2019-01-03 PROCEDURE — 3008F BODY MASS INDEX DOCD: CPT | Mod: CPTII,S$GLB,, | Performed by: NURSE PRACTITIONER

## 2019-01-03 PROCEDURE — 3077F PR MOST RECENT SYSTOLIC BLOOD PRESSURE >= 140 MM HG: ICD-10-PCS | Mod: CPTII,S$GLB,, | Performed by: NURSE PRACTITIONER

## 2019-01-03 PROCEDURE — 3008F PR BODY MASS INDEX (BMI) DOCUMENTED: ICD-10-PCS | Mod: CPTII,S$GLB,, | Performed by: NURSE PRACTITIONER

## 2019-01-03 PROCEDURE — 99999 PR PBB SHADOW E&M-EST. PATIENT-LVL IV: CPT | Mod: PBBFAC,,, | Performed by: NURSE PRACTITIONER

## 2019-01-03 RX ORDER — AMOXICILLIN 875 MG/1
875 TABLET, FILM COATED ORAL EVERY 12 HOURS
Refills: 1 | COMMUNITY
Start: 2018-12-31 | End: 2019-01-17

## 2019-01-03 RX ORDER — GABAPENTIN 300 MG/1
300 CAPSULE ORAL 3 TIMES DAILY
Qty: 90 CAPSULE | Refills: 1 | Status: SHIPPED | OUTPATIENT
Start: 2019-01-03 | End: 2019-02-22 | Stop reason: SDUPTHER

## 2019-01-03 RX ORDER — OLMESARTAN MEDOXOMIL AND HYDROCHLOROTHIAZIDE 20/12.5 20; 12.5 MG/1; MG/1
1 TABLET ORAL DAILY
Qty: 90 TABLET | Refills: 3 | Status: SHIPPED | OUTPATIENT
Start: 2019-01-03 | End: 2019-01-17 | Stop reason: DRUGHIGH

## 2019-01-03 RX ORDER — CYCLOBENZAPRINE HCL 5 MG
5 TABLET ORAL 3 TIMES DAILY PRN
Qty: 30 TABLET | Refills: 0 | Status: SHIPPED | OUTPATIENT
Start: 2019-01-03 | End: 2019-01-13
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
## 2019-01-17 ENCOUNTER — OFFICE VISIT (OUTPATIENT)
Dept: FAMILY MEDICINE | Facility: CLINIC | Age: 59
End: 2019-01-17
Payer: COMMERCIAL

## 2019-01-17 ENCOUNTER — CLINICAL SUPPORT (OUTPATIENT)
Dept: REHABILITATION | Facility: HOSPITAL | Age: 59
End: 2019-01-17
Payer: COMMERCIAL

## 2019-01-17 VITALS
SYSTOLIC BLOOD PRESSURE: 139 MMHG | DIASTOLIC BLOOD PRESSURE: 96 MMHG | BODY MASS INDEX: 24.4 KG/M2 | WEIGHT: 146.63 LBS | HEART RATE: 87 BPM | TEMPERATURE: 98 F

## 2019-01-17 DIAGNOSIS — M54.2 NECK PAIN: Primary | ICD-10-CM

## 2019-01-17 DIAGNOSIS — I10 ESSENTIAL HYPERTENSION: ICD-10-CM

## 2019-01-17 DIAGNOSIS — M50.121 CERVICAL DISC DISORDER AT C4-C5 LEVEL WITH RADICULOPATHY: ICD-10-CM

## 2019-01-17 PROCEDURE — 99214 OFFICE O/P EST MOD 30 MIN: CPT | Mod: S$GLB,,, | Performed by: NURSE PRACTITIONER

## 2019-01-17 PROCEDURE — 3075F PR MOST RECENT SYSTOLIC BLOOD PRESS GE 130-139MM HG: ICD-10-PCS | Mod: CPTII,S$GLB,, | Performed by: NURSE PRACTITIONER

## 2019-01-17 PROCEDURE — 3080F PR MOST RECENT DIASTOLIC BLOOD PRESSURE >= 90 MM HG: ICD-10-PCS | Mod: CPTII,S$GLB,, | Performed by: NURSE PRACTITIONER

## 2019-01-17 PROCEDURE — 3008F BODY MASS INDEX DOCD: CPT | Mod: CPTII,S$GLB,, | Performed by: NURSE PRACTITIONER

## 2019-01-17 PROCEDURE — 3080F DIAST BP >= 90 MM HG: CPT | Mod: CPTII,S$GLB,, | Performed by: NURSE PRACTITIONER

## 2019-01-17 PROCEDURE — 99999 PR PBB SHADOW E&M-EST. PATIENT-LVL III: ICD-10-PCS | Mod: PBBFAC,,, | Performed by: NURSE PRACTITIONER

## 2019-01-17 PROCEDURE — 3008F PR BODY MASS INDEX (BMI) DOCUMENTED: ICD-10-PCS | Mod: CPTII,S$GLB,, | Performed by: NURSE PRACTITIONER

## 2019-01-17 PROCEDURE — 99214 PR OFFICE/OUTPT VISIT, EST, LEVL IV, 30-39 MIN: ICD-10-PCS | Mod: S$GLB,,, | Performed by: NURSE PRACTITIONER

## 2019-01-17 PROCEDURE — 97110 THERAPEUTIC EXERCISES: CPT | Mod: PN | Performed by: PHYSICAL THERAPIST

## 2019-01-17 PROCEDURE — 97161 PT EVAL LOW COMPLEX 20 MIN: CPT | Mod: PN | Performed by: PHYSICAL THERAPIST

## 2019-01-17 PROCEDURE — 99999 PR PBB SHADOW E&M-EST. PATIENT-LVL III: CPT | Mod: PBBFAC,,, | Performed by: NURSE PRACTITIONER

## 2019-01-17 PROCEDURE — 3075F SYST BP GE 130 - 139MM HG: CPT | Mod: CPTII,S$GLB,, | Performed by: NURSE PRACTITIONER

## 2019-01-17 RX ORDER — OLMESARTAN MEDOXOMIL AND HYDROCHLOROTHIAZIDE 40/25 40; 25 MG/1; MG/1
1 TABLET ORAL DAILY
Qty: 90 TABLET | Refills: 3 | Status: SHIPPED | OUTPATIENT
Start: 2019-01-17 | End: 2019-03-29

## 2019-01-17 RX ORDER — TRAMADOL HYDROCHLORIDE 50 MG/1
100 TABLET ORAL EVERY 8 HOURS PRN
Qty: 120 TABLET | Refills: 2 | Status: CANCELLED | OUTPATIENT
Start: 2019-01-17 | End: 2019-04-17

## 2019-01-17 RX ORDER — CYCLOBENZAPRINE HCL 5 MG
5 TABLET ORAL 3 TIMES DAILY PRN
Qty: 30 TABLET | Refills: 3 | Status: SHIPPED | OUTPATIENT
Start: 2019-01-17 | End: 2019-03-20 | Stop reason: SDUPTHER

## 2019-01-17 NOTE — PATIENT INSTRUCTIONS
AROM: Neck Rotation        Turn head slowly to look over one shoulder, then the other. Hold each position __3__ seconds.  Repeat __10__ times per set. Do __3__ sets per session. Do __1__ sessions per day.     [email or web address]     Copyright © [employer]. All rights reserved.   AROM: Lateral Neck Flexion        Slowly tilt head toward one shoulder, then the other. Hold each position __3__ seconds.  Repeat __10__ times per set. Do __3__ sets per session. Do __1__ sessions per day.     [email or web address]     Copyright © [employer]. All rights reserved.   AROM: Neck Flexion        Bend head forward. Hold __3__ seconds.  Repeat __10__ times per set. Do __3__ sets per session. Do __1__ sessions per day.     [email or web address]     Copyright © [employer]. All rights reserved.   Flexibility: Upper Trapezius Stretch        Gently grasp right side of head while reaching behind back with other hand. Tilt head away until a gentle stretch is felt. Hold __10__ seconds.  Repeat __10__ times per set. Do __1__ sets per session. Do __1__ sessions per day.     [email or web address]     Copyright © [employer]. All rights reserved.   Flexibility: Corner Stretch        Standing in corner with hands just above shoulder level and feet __?__ inches from corner, lean forward until a comfortable stretch is felt across chest. Hold __10__ seconds.  Repeat __10__ times per set. Do __1__ sets per session. Do __1__ sessions per day.     [email or web address]     Copyright © [employer]. All rights reserved.
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
## 2019-01-17 NOTE — TELEPHONE ENCOUNTER
Advised pt PM referral has been placed. Scheduled for PM appt on 01/23/19. Pt verbalized understanding.

## 2019-01-17 NOTE — PROGRESS NOTES
Subjective:       Patient ID: Jesi Nagy is a 58 y.o. female.    Chief Complaint: Neck Pain (fu)    Ms. Nagy presents to the clinic today for follow up for neck pain.  Pain has continued and is now involving anterior neck as well as posterior.  She will see Dr. Bergeron next week.  Cyclobenzaprine is helping and she would like refill.  She also requests tramadol to be increased to TID since it is wearing off too soon.  She continues to have elevated BP's at home.        Review of Systems   Constitutional: Negative for activity change and unexpected weight change.   HENT: Negative for hearing loss, rhinorrhea and trouble swallowing.    Eyes: Negative for discharge and visual disturbance.   Respiratory: Negative for chest tightness and wheezing.    Cardiovascular: Negative for chest pain and palpitations.   Gastrointestinal: Negative for blood in stool, constipation, diarrhea and vomiting.   Endocrine: Negative for polydipsia and polyuria.   Genitourinary: Negative for difficulty urinating, dysuria, hematuria and menstrual problem.   Musculoskeletal: Positive for neck pain. Negative for arthralgias and joint swelling.   Neurological: Positive for numbness (hands). Negative for weakness and headaches.   Psychiatric/Behavioral: Negative for confusion and dysphoric mood.       Objective:      Physical Exam   Constitutional: She is oriented to person, place, and time. She appears well-nourished. No distress.   HENT:   Head: Normocephalic and atraumatic.   Right Ear: External ear normal.   Left Ear: External ear normal.   Mouth/Throat: Oropharynx is clear and moist.   Eyes: Right eye exhibits no discharge. Left eye exhibits no discharge.   Cardiovascular: Normal rate.   Pulmonary/Chest: Effort normal.   Musculoskeletal:        Cervical back: She exhibits decreased range of motion and tenderness. She exhibits no bony tenderness.   Neurological: She is alert and oriented to person, place, and time. Coordination  normal.   Skin: Skin is warm and dry.   Psychiatric: She has a normal mood and affect. Her behavior is normal. Thought content normal.   Vitals reviewed.          Current Outpatient Medications:     chlorhexidine (PERIDEX) 0.12 % solution, Use as directed 15 mLs in the mouth or throat 2 (two) times daily., Disp: 473 mL, Rfl: 5    cyclobenzaprine (FLEXERIL) 5 MG tablet, Take 1 tablet (5 mg total) by mouth 3 (three) times daily as needed for Muscle spasms., Disp: 30 tablet, Rfl: 3    fluticasone (FLONASE) 50 mcg/actuation nasal spray, 1 spray (50 mcg total) by Each Nare route once daily., Disp: 16 g, Rfl: 6    gabapentin (NEURONTIN) 300 MG capsule, Take 1 capsule (300 mg total) by mouth 3 (three) times daily., Disp: 90 capsule, Rfl: 1    olmesartan-hydrochlorothiazide (BENICAR HCT) 40-25 mg per tablet, Take 1 tablet by mouth once daily., Disp: 90 tablet, Rfl: 3    traMADol (ULTRAM) 50 mg tablet, Take 2 tablets (100 mg total) by mouth every 12 (twelve) hours as needed for Pain., Disp: 120 tablet, Rfl: 2    traZODone (DESYREL) 50 MG tablet, Take 1 tablet (50 mg total) by mouth every evening., Disp: 90 tablet, Rfl: 3  Assessment:       1. Neck pain    2. Essential hypertension        Plan:       Neck pain  Sent tramadol request to PCP.  Starts PT tomorrow, Neurosurgery next week.  -     cyclobenzaprine (FLEXERIL) 5 MG tablet; Take 1 tablet (5 mg total) by mouth 3 (three) times daily as needed for Muscle spasms.  Dispense: 30 tablet; Refill: 3    Essential hypertension  Increase:  -     olmesartan-hydrochlorothiazide (BENICAR HCT) 40-25 mg per tablet; Take 1 tablet by mouth once daily.  Dispense: 90 tablet; Refill: 3  RTC 1 month    Patient readiness: acceptance and barriers:none    During the course of the visit the patient was educated and counseled about the following:     Hypertension:   Medication: increasing benicar.    Goals: Hypertension: Reduce Blood Pressure    Did patient meet goals/outcomes: No    The  following self management tools provided: declined    Patient Instructions (the written plan) was given to the patient/family.     Time spent with patient: 15 minutes    Barriers to medications present (no )    Adverse reactions to current medications (no)    Over the counter medications reviewed (Yes)

## 2019-01-17 NOTE — TELEPHONE ENCOUNTER
Patient has neck pain and requests tramadol to be increased to TID.  She is going to see Dr. Cooley tomorrow.

## 2019-01-17 NOTE — TELEPHONE ENCOUNTER
Please notify patient that I do not fill his prescription over the phone and she will need a visit.  She should also have 1 refill awaiting her at her pharmacy still.  She is not due to have this prescription renewed until February 17, 2019.  Also notify her that I cannot increase the dose of her tramadol any further than where she is at.  If she would like increased dose I will have to place a referral to a pain management doctor.

## 2019-01-22 ENCOUNTER — TELEPHONE (OUTPATIENT)
Dept: FAMILY MEDICINE | Facility: CLINIC | Age: 59
End: 2019-01-22

## 2019-01-22 NOTE — TELEPHONE ENCOUNTER
received a fax from pharmacy that patient wants provider review. Patient was really confused about refills. explained her how refills works and asked her if she was ok on her medications she stated she is fine until her next appt. Sent fax to provider for review.

## 2019-01-23 ENCOUNTER — OFFICE VISIT (OUTPATIENT)
Dept: PAIN MEDICINE | Facility: CLINIC | Age: 59
End: 2019-01-23
Payer: COMMERCIAL

## 2019-01-23 VITALS
HEART RATE: 93 BPM | SYSTOLIC BLOOD PRESSURE: 111 MMHG | HEIGHT: 65 IN | BODY MASS INDEX: 24.16 KG/M2 | DIASTOLIC BLOOD PRESSURE: 76 MMHG | WEIGHT: 145 LBS

## 2019-01-23 DIAGNOSIS — M47.892 OTHER SPONDYLOSIS, CERVICAL REGION: ICD-10-CM

## 2019-01-23 DIAGNOSIS — M54.12 CERVICAL RADICULITIS: ICD-10-CM

## 2019-01-23 DIAGNOSIS — M50.30 DDD (DEGENERATIVE DISC DISEASE), CERVICAL: Primary | ICD-10-CM

## 2019-01-23 PROCEDURE — 99999 PR PBB SHADOW E&M-EST. PATIENT-LVL III: ICD-10-PCS | Mod: PBBFAC,,, | Performed by: ANESTHESIOLOGY

## 2019-01-23 PROCEDURE — 99244 PR OFFICE CONSULTATION,LEVEL IV: ICD-10-PCS | Mod: S$GLB,,, | Performed by: ANESTHESIOLOGY

## 2019-01-23 PROCEDURE — 99244 OFF/OP CNSLTJ NEW/EST MOD 40: CPT | Mod: S$GLB,,, | Performed by: ANESTHESIOLOGY

## 2019-01-23 PROCEDURE — 99999 PR PBB SHADOW E&M-EST. PATIENT-LVL III: CPT | Mod: PBBFAC,,, | Performed by: ANESTHESIOLOGY
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
## 2019-01-23 NOTE — LETTER
January 23, 2019      Westley Tucker MD  [address]  [place] [address]           [place] [facility]  [address] [place] [address] [address]  [place] [address]  Phone: [phone]  Fax: [phone]          Patient: Jesi Nagy   MR Number: [id number]   YOB: 1960   Date of Visit: 1/23/2019       Dear Dr. Westley Tucker:    Thank you for referring Jesi Nagy to me for evaluation. Attached you will find relevant portions of my assessment and plan of care.    If you have questions, please do not hesitate to call me. I look forward to following Jesi Nagy along with you.    Sincerely,    Chet Bello MD    Enclosure  CC:  No Recipients    If you would like to receive this communication electronically, please contact [email or web address] or [phone] to request more information on [facility] access.    For providers and/or their staff who would like to refer a patient to [facility], please contact us through our one-stop-shop provider referral line, [facility], at [phone].    If you feel you have received this communication in error or would no longer like to receive these types of communications, please e-mail [email or web address]

## 2019-01-28 ENCOUNTER — OFFICE VISIT (OUTPATIENT)
Dept: NEUROSURGERY | Facility: CLINIC | Age: 59
End: 2019-01-28
Payer: COMMERCIAL

## 2019-01-28 ENCOUNTER — TELEPHONE (OUTPATIENT)
Dept: NEUROSURGERY | Facility: CLINIC | Age: 59
End: 2019-01-28

## 2019-01-28 VITALS
HEART RATE: 80 BPM | BODY MASS INDEX: 24.15 KG/M2 | HEIGHT: 65 IN | WEIGHT: 144.94 LBS | SYSTOLIC BLOOD PRESSURE: 115 MMHG | DIASTOLIC BLOOD PRESSURE: 88 MMHG

## 2019-01-28 DIAGNOSIS — M50.30 DEGENERATION OF CERVICAL DISC WITHOUT MYELOPATHY: Primary | ICD-10-CM

## 2019-01-28 DIAGNOSIS — M81.0 OSTEOPOROSIS, UNSPECIFIED OSTEOPOROSIS TYPE, UNSPECIFIED PATHOLOGICAL FRACTURE PRESENCE: ICD-10-CM

## 2019-01-28 PROCEDURE — 3079F DIAST BP 80-89 MM HG: CPT | Mod: CPTII,S$GLB,, | Performed by: NEUROLOGICAL SURGERY

## 2019-01-28 PROCEDURE — 3079F PR MOST RECENT DIASTOLIC BLOOD PRESSURE 80-89 MM HG: ICD-10-PCS | Mod: CPTII,S$GLB,, | Performed by: NEUROLOGICAL SURGERY

## 2019-01-28 PROCEDURE — 99214 OFFICE O/P EST MOD 30 MIN: CPT | Mod: S$GLB,,, | Performed by: NEUROLOGICAL SURGERY

## 2019-01-28 PROCEDURE — 3074F SYST BP LT 130 MM HG: CPT | Mod: CPTII,S$GLB,, | Performed by: NEUROLOGICAL SURGERY

## 2019-01-28 PROCEDURE — 3074F PR MOST RECENT SYSTOLIC BLOOD PRESSURE < 130 MM HG: ICD-10-PCS | Mod: CPTII,S$GLB,, | Performed by: NEUROLOGICAL SURGERY

## 2019-01-28 PROCEDURE — 99999 PR PBB SHADOW E&M-EST. PATIENT-LVL III: ICD-10-PCS | Mod: PBBFAC,,, | Performed by: NEUROLOGICAL SURGERY

## 2019-01-28 PROCEDURE — 99214 PR OFFICE/OUTPT VISIT, EST, LEVL IV, 30-39 MIN: ICD-10-PCS | Mod: S$GLB,,, | Performed by: NEUROLOGICAL SURGERY

## 2019-01-28 PROCEDURE — 3008F PR BODY MASS INDEX (BMI) DOCUMENTED: ICD-10-PCS | Mod: CPTII,S$GLB,, | Performed by: NEUROLOGICAL SURGERY

## 2019-01-28 PROCEDURE — 99999 PR PBB SHADOW E&M-EST. PATIENT-LVL III: CPT | Mod: PBBFAC,,, | Performed by: NEUROLOGICAL SURGERY

## 2019-01-28 PROCEDURE — 3008F BODY MASS INDEX DOCD: CPT | Mod: CPTII,S$GLB,, | Performed by: NEUROLOGICAL SURGERY
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
## 2019-01-28 NOTE — TELEPHONE ENCOUNTER
Please call pt to schedule for a cervical NELLY per Dr. Cooley. We will follow up with pt 2 weeks after the NELLY.

## 2019-01-29 ENCOUNTER — CLINICAL SUPPORT (OUTPATIENT)
Dept: REHABILITATION | Facility: HOSPITAL | Age: 59
End: 2019-01-29
Payer: COMMERCIAL

## 2019-01-29 DIAGNOSIS — M54.2 NECK PAIN: ICD-10-CM

## 2019-01-29 PROCEDURE — 97110 THERAPEUTIC EXERCISES: CPT | Mod: PN | Performed by: PHYSICAL THERAPIST

## 2019-01-29 PROCEDURE — 97012 MECHANICAL TRACTION THERAPY: CPT | Mod: PN | Performed by: PHYSICAL THERAPIST

## 2019-01-29 NOTE — PROGRESS NOTES
Name: Jesi Nagy  Clinic Number: [id number]  Date of Treatment: 01/29/2019   Diagnosis:   Encounter Diagnosis   Name Primary?    Neck pain        Time in: 1600  Time Out: 1700  Total Treatment Time: 60  Group Time: 0      Subjective:    Jesi reports she was only able to perform a limited amount of her HEP .  Patient reports their pain to be 5/10 on a 0-10 scale with 0 being no pain and 10 being the worst pain imaginable.    Objective    Patient received individual therapy to increase strength, endurance, ROM and flexibility with 0 patients with activities as follows:     Jesi received therapeutic exercises to develop strength, endurance, ROM, flexibility and posture for 45 minutes including:     UBE 4 min forward & backward  Scapular retraction 3 x 10 OTB  Cable column extension 3 x 10  3#  B  Cable column adduction 3 x 10  3#  B  Cable column mid row 3 x 10 3#    Chin tucks 3 x 10    Patient received intermittent cervical traction x 15 min at 10# max/ 5# min, 30 sec on/ 10 sec off using.    Assessment:       Pt will continue to benefit from skilled PT intervention. Medical Necessity is demonstrated by:  Requires skilled supervision to complete and progress HEP and Weakness.    Patient is making fair progress towards established goals.    New/Revised goals: na      Plan:  Continue with established Plan of Care towards PT goals.

## 2019-01-31 ENCOUNTER — CLINICAL SUPPORT (OUTPATIENT)
Dept: REHABILITATION | Facility: HOSPITAL | Age: 59
End: 2019-01-31
Payer: COMMERCIAL

## 2019-01-31 DIAGNOSIS — M54.12 CERVICAL RADICULOPATHY: Primary | ICD-10-CM

## 2019-01-31 DIAGNOSIS — M54.2 NECK PAIN: ICD-10-CM

## 2019-01-31 PROCEDURE — 97012 MECHANICAL TRACTION THERAPY: CPT | Mod: PN | Performed by: PHYSICAL THERAPIST

## 2019-01-31 PROCEDURE — 97140 MANUAL THERAPY 1/> REGIONS: CPT | Mod: PN | Performed by: PHYSICAL THERAPIST

## 2019-01-31 PROCEDURE — 97110 THERAPEUTIC EXERCISES: CPT | Mod: PN | Performed by: PHYSICAL THERAPIST

## 2019-01-31 NOTE — PROGRESS NOTES
Name: Jesi Nagy  Clinic Number: [id number]  Date of Treatment: 01/31/2019   Diagnosis:   Encounter Diagnosis   Name Primary?    Neck pain        Time in: 1700  Time Out: 1800  Total Treatment Time: 60  Group Time: 0      Subjective:    Jesi reports no change in symptoms.  Patient reports their pain to be 7/10 on a 0-10 scale with 0 being no pain and 10 being the worst pain imaginable.    Objective    Patient received individual therapy to increase strength, ROM, flexibility and posture with 0 patients with activities as follows:     Jesi received therapeutic exercises to develop strength, ROM, flexibility and posture for 35 minutes including:        UBE 5 min forward & backward  Scapular retraction 3 x 10 OTB  Cable column extension 3 x 10  3#  B  Cable column adduction 3 x 10  3#  B  Cable column mid row 3 x 10 3#    Chin tucks 3 x 10     Patient received intermittent cervical traction x 15 min at 10# max/ 5# min, 30 sec on/ 10 sec off using..     The patient received the following manual therapy techniques: soft tissue mobilization  was applied to the: right upper trapezius  for 10 minutes.     Assessment:       Pt will continue to benefit from skilled PT intervention. Medical Necessity is demonstrated by:  Unable to participate fully in daily activities, Requires skilled supervision to complete and progress HEP and Weakness.    Patient is making fair progress towards established goals.    New/Revised goals: na      Plan:  Continue with established Plan of Care towards PT goals.  Dry needling next visit

## 2019-02-05 ENCOUNTER — CLINICAL SUPPORT (OUTPATIENT)
Dept: REHABILITATION | Facility: HOSPITAL | Age: 59
End: 2019-02-05
Payer: COMMERCIAL

## 2019-02-05 DIAGNOSIS — M54.2 NECK PAIN: ICD-10-CM

## 2019-02-05 PROCEDURE — 97012 MECHANICAL TRACTION THERAPY: CPT | Mod: PN | Performed by: PHYSICAL THERAPIST

## 2019-02-05 PROCEDURE — 97110 THERAPEUTIC EXERCISES: CPT | Mod: PN | Performed by: PHYSICAL THERAPIST

## 2019-02-05 PROCEDURE — 97140 MANUAL THERAPY 1/> REGIONS: CPT | Mod: PN | Performed by: PHYSICAL THERAPIST

## 2019-02-05 NOTE — PROGRESS NOTES
Name: Jesi Nagy  Clinic Number: [id number]  Date of Treatment: 02/05/2019   Diagnosis:   Encounter Diagnosis   Name Primary?    Neck pain        Time in: 1545  Time Out: 1645  Total Treatment Time: 60  Group Time: 0      Subjective:    Jesi reports continued left sided cervical pain .  Patient reports their pain to be 5/10 on a 0-10 scale with 0 being no pain and 10 being the worst pain imaginable.    Objective    Patient received individual therapy to increase strength, endurance, ROM and flexibility with 0 patients with activities as follows:     Jesi received therapeutic exercises to develop strength, endurance, ROM and flexibility for 32 minutes including:     UBE 5 min forward & backward  Scapular retraction 3 x 10 OTB  Cable column extension 3 x 10  3#  B  Cable column adduction 3 x 10  3#  B  Cable column mid row 3 x 10 3#    Chin tucks 3 x 10     Patient received intermittent cervical traction x 15 min at 10# max/ 5# min, 30 sec on/ 10 sec off using..      IASTM to the left SCM for 8 minutes       Assessment:       Pt will continue to benefit from skilled PT intervention. Medical Necessity is demonstrated by:  Unable to participate fully in daily activities and Requires skilled supervision to complete and progress HEP.    Patient is making good progress towards established goals.    New/Revised goals: na      Plan:  Continue with established Plan of Care towards PT goals.

## 2019-02-12 ENCOUNTER — CLINICAL SUPPORT (OUTPATIENT)
Dept: REHABILITATION | Facility: HOSPITAL | Age: 59
End: 2019-02-12
Payer: COMMERCIAL

## 2019-02-12 DIAGNOSIS — M50.121 CERVICAL DISC DISORDER AT C4-C5 LEVEL WITH RADICULOPATHY: ICD-10-CM

## 2019-02-12 DIAGNOSIS — M54.2 NECK PAIN: ICD-10-CM

## 2019-02-12 PROCEDURE — 97012 MECHANICAL TRACTION THERAPY: CPT | Mod: PN | Performed by: PHYSICAL THERAPIST

## 2019-02-12 PROCEDURE — 97110 THERAPEUTIC EXERCISES: CPT | Mod: PN | Performed by: PHYSICAL THERAPIST

## 2019-02-12 PROCEDURE — 97140 MANUAL THERAPY 1/> REGIONS: CPT | Mod: PN | Performed by: PHYSICAL THERAPIST

## 2019-02-12 NOTE — PROGRESS NOTES
Name: Jesi Nagy  Clinic Number: [id number]  Date of Treatment: 02/12/2019   Diagnosis:   Encounter Diagnosis   Name Primary?    Neck pain        Time in: 1700  Time Out: 1800  Total Treatment Time: 60  Group Time: 0      Subjective:    Jesi reports worsening of symptoms.  Patient reports their pain to be 7/10 on a 0-10 scale with 0 being no pain and 10 being the worst pain imaginable.    Objective    Patient received individual therapy to increase endurance and ROM with 0 patients with activities as follows:     Jesi received therapeutic exercises to develop endurance and ROM for 30 minutes including:     UBE 5 forward/backward  Anterior chest wall stretch 10 x 10 sec  Cable column extension 3 x 10  3#  B  Cable column adduction 3 x 10  3#  B  Cable column mid row 3 x 10 3#      Patient received intermittent cervical traction x 15 min at 10# max/ 5# min, 30 sec on/ 10 sec off using..     Dry needling was performed to the patient's left upper trapezius with 60 mm needles for 15 minutes. No increase pain was the result of the procedure.       Assessment:       Pt will continue to benefit from skilled PT intervention. Medical Necessity is demonstrated by:  Requires skilled supervision to complete and progress HEP and Weakness.    Patient is making fair progress towards established goals.    New/Revised goals: na      Plan:  Continue with established Plan of Care towards PT goals.

## 2019-02-14 ENCOUNTER — HOSPITAL ENCOUNTER (OUTPATIENT)
Dept: RADIOLOGY | Facility: CLINIC | Age: 59
Discharge: HOME | End: 2019-02-14
Attending: NEUROLOGICAL SURGERY
Payer: COMMERCIAL

## 2019-02-14 DIAGNOSIS — M81.0 OSTEOPOROSIS, UNSPECIFIED OSTEOPOROSIS TYPE, UNSPECIFIED PATHOLOGICAL FRACTURE PRESENCE: ICD-10-CM

## 2019-02-14 DIAGNOSIS — M50.30 DEGENERATION OF CERVICAL DISC WITHOUT MYELOPATHY: ICD-10-CM

## 2019-02-14 DIAGNOSIS — M54.12 BRACHIAL NEURITIS: Primary | ICD-10-CM

## 2019-02-14 PROCEDURE — 77080 DXA BONE DENSITY AXIAL: CPT | Mod: 26,,, | Performed by: RADIOLOGY

## 2019-02-14 PROCEDURE — 77080 DEXA BONE DENSITY SPINE HIP: ICD-10-PCS | Mod: 26,,, | Performed by: RADIOLOGY

## 2019-02-14 PROCEDURE — 77080 DXA BONE DENSITY AXIAL: CPT | Mod: TC,PO

## 2019-02-14 RX ORDER — TRAMADOL HYDROCHLORIDE 50 MG/1
TABLET ORAL
Qty: 120 TABLET | OUTPATIENT
Start: 2019-02-14

## 2019-02-15 ENCOUNTER — HOSPITAL ENCOUNTER (OUTPATIENT)
Facility: AMBULARY SURGERY CENTER | Age: 59
Discharge: HOME | End: 2019-02-15
Attending: ANESTHESIOLOGY | Admitting: ANESTHESIOLOGY
Payer: COMMERCIAL

## 2019-02-15 DIAGNOSIS — M54.12 CERVICAL RADICULITIS: Primary | ICD-10-CM

## 2019-02-15 PROCEDURE — 62321 NJX INTERLAMINAR CRV/THRC: CPT | Mod: ,,, | Performed by: ANESTHESIOLOGY

## 2019-02-15 PROCEDURE — 99152 MOD SED SAME PHYS/QHP 5/>YRS: CPT | Mod: ,,, | Performed by: ANESTHESIOLOGY

## 2019-02-15 PROCEDURE — 62321 NJX INTERLAMINAR CRV/THRC: CPT | Performed by: ANESTHESIOLOGY

## 2019-02-15 PROCEDURE — 99152 PR MOD CONSCIOUS SEDATION, SAME PHYS, 5+ YRS, FIRST 15 MIN: ICD-10-PCS | Mod: ,,, | Performed by: ANESTHESIOLOGY

## 2019-02-15 PROCEDURE — 62321 PR INJ CERV/THORAC, W/GUIDANCE: ICD-10-PCS | Mod: ,,, | Performed by: ANESTHESIOLOGY

## 2019-02-15 RX ORDER — MIDAZOLAM HYDROCHLORIDE 2 MG/2ML
INJECTION, SOLUTION INTRAMUSCULAR; INTRAVENOUS
Status: DISCONTINUED | OUTPATIENT
Start: 2019-02-15 | End: 2019-02-15 | Stop reason: HOSPADM

## 2019-02-15 RX ORDER — SODIUM CHLORIDE, SODIUM LACTATE, POTASSIUM CHLORIDE, CALCIUM CHLORIDE 600; 310; 30; 20 MG/100ML; MG/100ML; MG/100ML; MG/100ML
INJECTION, SOLUTION INTRAVENOUS CONTINUOUS
Status: DISCONTINUED | OUTPATIENT
Start: 2019-02-15 | End: 2019-02-15 | Stop reason: HOSPADM

## 2019-02-15 RX ORDER — SODIUM CHLORIDE 9 MG/ML
INJECTION, SOLUTION INTRAMUSCULAR; INTRAVENOUS; SUBCUTANEOUS
Status: DISCONTINUED | OUTPATIENT
Start: 2019-02-15 | End: 2019-02-15 | Stop reason: HOSPADM

## 2019-02-15 RX ORDER — DEXAMETHASONE SODIUM PHOSPHATE 10 MG/ML
INJECTION INTRAMUSCULAR; INTRAVENOUS
Status: DISCONTINUED | OUTPATIENT
Start: 2019-02-15 | End: 2019-02-15 | Stop reason: HOSPADM

## 2019-02-15 RX ORDER — LIDOCAINE HYDROCHLORIDE 10 MG/ML
INJECTION, SOLUTION EPIDURAL; INFILTRATION; INTRACAUDAL; PERINEURAL
Status: DISCONTINUED | OUTPATIENT
Start: 2019-02-15 | End: 2019-02-15 | Stop reason: HOSPADM

## 2019-02-15 RX ORDER — FENTANYL CITRATE 50 UG/ML
INJECTION, SOLUTION INTRAMUSCULAR; INTRAVENOUS
Status: DISCONTINUED | OUTPATIENT
Start: 2019-02-15 | End: 2019-02-15 | Stop reason: HOSPADM

## 2019-02-15 RX ADMIN — SODIUM CHLORIDE, SODIUM LACTATE, POTASSIUM CHLORIDE, CALCIUM CHLORIDE: 600; 310; 30; 20 INJECTION, SOLUTION INTRAVENOUS at 11:02

## 2019-02-15 NOTE — OP NOTE
PROCEDURE DATE: 2/15/2019    Procedure: C7-T1 cervical interlaminar epidural steroid injection under utilizing fluoroscopy.    Diagnosis: Cervical Degenerative Disc Diease; Cervical Radiculitis  POSTOP DIAGNOSIS: SAME    Physician: Chet Bello MD    Medications injected:  Dexamethasone 10mg followed by a slow injection of 4 mL sterile, preservative-free normal saline.    Local anesthetic used: Lidocaine 1%, 2 ml.    Sedation Medications: RN IV sedation    Complications:  None    Estimated blood loss: NOne    Technique:  A time-out was taken to identify patient and procedure prior to starting the procedure.  With the patient laying in a prone position with the neck in a mid-flexed forward position, the area was prepped and draped in the usual sterile fashion using ChloraPrep and a fenestrated drape.  The area was determined under AP fluoroscopic guidance.  Local anesthetic was given using a 25-gauge 1.5 inch needle by raising a wheal and then infiltrating ventrally.  A 3.5 inch 20-gauge Touhy needle was introduced under fluoroscopic guidance to meet the lamina of C7.  The needle was then hinged under the lamina then advanced using loss of resistance technique.  Once the tip of the needle was in the desired position, the 1ml contrast dye Omnipaque was injected to determine placement and no uptake.  The steroid was then injected slowly followed by a slow injection of 4 mL of the sterile preservative-free normal saline.  The patient tolerated the procedure well.    The patient was monitored after the procedure and was given post-procedure and discharge instructions to follow at home. The patient was discharged in a stable condition.

## 2019-02-15 NOTE — PLAN OF CARE
Stable, states ready to go home, amna po fluids, denies pain, able to hold legs off bed and stand without weakness,ambulated to car with RN to

## 2019-02-15 NOTE — DISCHARGE INSTRUCTIONS
Before leaving, please make sure you have all your personal belongings such as glasses, purses, wallets, keys, cell phones, jewelry, jackets etc      Anesthesia information    Anesthesia Safety      You have been given medicine  to sedate you during your procedure today. This may have included both a pain medicine and sleeping medicine. Most of the effects have worn off; however, you may continue to have some drowsiness for the next  24 hours. Anesthesia and pain medicines can cause nausea, sleepiness, dizziness and  constipation.    HOME CARE:  1) For the next EIGHT HOURS, you should be watched by a responsible adult to look for any worsening of your condition.  2) DO NOT DRINK any ALCOHOL for the next 24 HOURS.  3) DO NOT DRIVE or operate dangerous machinery during the next 24 HOURS.  FOLLOW UP with your doctor or this facility if you are not alert and back to your usual level of activity within 24 hrs.  GET PROMPT MEDICAL ATTENTION if any of the following occur:  -- Increased drowsiness  -- Increased weakness or dizziness  -- Repeated vomiting  -- If you cannot be awakened    Pain injection instructions:     This procedure may take a couple weeks to relieve pain  You may get some pain relief from the local anesthetic initally.    No driving for 24 hrs.   Activity as tolerated- gradually increase activities.  Dont lift over 10 lbs for 24 hrs   No heat at injection sites x 2 days. No heating pads, hot tubs, saunas, or swimming in any body of water or pool for 2 days.  Use ice pack for mild swelling and for comfort , apply for 20 minutes, remove for 20 minute intervals. No direct contact of ice itself  to skin.  May shower today. No tub baths for two days.      Resume Aspirin, Plavix, or Coumadin the day after the procedure unless otherwise instructed.   If diabetic,monitor your glucose carefully as steroids can increase your glucose level    Seek immediate medical help for:   Severe increase in your usual pain or  appearance of new pain.  Prolonged (mor than 8 hours) or increasing weakness or numbness in the legs or arms. Numbing medicine was injected and can affect the messages to and from the brain and legs or arms.  .    Fever above 101 ,Drainage,redness,active bleeding, or increased swelling at the injection site.  Headache, shortness of breath, chest pain, or breathing problems.

## 2019-02-15 NOTE — DISCHARGE SUMMARY
[facility]  Discharge Note  Short Stay    Admit Date: 2/15/2019    Discharge Date and Time: 2/15/2019    Attending Physician: Chet Bello MD     Discharge Provider: Chet Bello    Diagnoses:  Active Hospital Problems    Diagnosis  POA    *Cervical radiculitis [M54.12]  Yes      Resolved Hospital Problems   No resolved problems to display.       Hospital Course: Cervical NELLY  Discharged Condition: Good    Final Diagnoses:   Active Hospital Problems    Diagnosis  POA    *Cervical radiculitis [M54.12]  Yes      Resolved Hospital Problems   No resolved problems to display.       Disposition: Home or Self Care    Follow up/Patient Instructions:    Medications:  Reconciled Home Medications:      Medication List      CONTINUE taking these medications    chlorhexidine 0.12 % solution  Commonly known as:  PERIDEX  Use as directed 15 mLs in the mouth or throat 2 (two) times daily.     fluticasone 50 mcg/actuation nasal spray  Commonly known as:  FLONASE  1 spray (50 mcg total) by Each Nare route once daily.     gabapentin 300 MG capsule  Commonly known as:  NEURONTIN  Take 1 capsule (300 mg total) by mouth 3 (three) times daily.     olmesartan-hydrochlorothiazide 40-25 mg per tablet  Commonly known as:  BENICAR HCT  Take 1 tablet by mouth once daily.     traMADol 50 mg tablet  Commonly known as:  ULTRAM  Take 2 tablets (100 mg total) by mouth every 12 (twelve) hours as needed for Pain.     traZODone 50 MG tablet  Commonly known as:  DESYREL  Take 1 tablet (50 mg total) by mouth every evening.          Discharge Procedure Orders   Call MD for:  temperature >100.4     Call MD for:  persistent nausea and vomiting or diarrhea     Call MD for:  severe uncontrolled pain     Call MD for:  redness, tenderness, or signs of infection (pain, swelling, redness, odor or green/yellow discharge around incision site)     Call MD for:  difficulty breathing or increased cough     Call MD for:  severe persistent headache         Follow up with MD in 2-3 weeks    Discharge Procedure Orders (must include Diet, Follow-up, Activity):   Discharge Procedure Orders (must include Diet, Follow-up, Activity)   Call MD for:  temperature >100.4     Call MD for:  persistent nausea and vomiting or diarrhea     Call MD for:  severe uncontrolled pain     Call MD for:  redness, tenderness, or signs of infection (pain, swelling, redness, odor or green/yellow discharge around incision site)     Call MD for:  difficulty breathing or increased cough     Call MD for:  severe persistent headache

## 2019-02-18 VITALS
OXYGEN SATURATION: 95 % | WEIGHT: 144 LBS | RESPIRATION RATE: 20 BRPM | SYSTOLIC BLOOD PRESSURE: 111 MMHG | BODY MASS INDEX: 23.99 KG/M2 | HEIGHT: 65 IN | TEMPERATURE: 98 F | DIASTOLIC BLOOD PRESSURE: 80 MMHG | HEART RATE: 75 BPM

## 2019-02-22 DIAGNOSIS — M54.2 NECK PAIN: ICD-10-CM

## 2019-02-22 RX ORDER — GABAPENTIN 300 MG/1
CAPSULE ORAL
Qty: 90 CAPSULE | Refills: 1 | Status: SHIPPED | OUTPATIENT
Start: 2019-02-22 | End: 2019-03-18

## 2019-02-25 PROBLEM — J01.10 ACUTE FRONTAL SINUSITIS: Status: RESOLVED | Noted: 2018-11-21 | Resolved: 2019-02-25

## 2019-02-26 ENCOUNTER — CLINICAL SUPPORT (OUTPATIENT)
Dept: REHABILITATION | Facility: HOSPITAL | Age: 59
End: 2019-02-26
Payer: COMMERCIAL

## 2019-02-26 DIAGNOSIS — M54.2 NECK PAIN: ICD-10-CM

## 2019-02-26 PROCEDURE — 97110 THERAPEUTIC EXERCISES: CPT | Mod: PN | Performed by: PHYSICAL THERAPIST

## 2019-02-26 PROCEDURE — 97140 MANUAL THERAPY 1/> REGIONS: CPT | Mod: PN | Performed by: PHYSICAL THERAPIST

## 2019-02-26 PROCEDURE — 97012 MECHANICAL TRACTION THERAPY: CPT | Mod: PN | Performed by: PHYSICAL THERAPIST

## 2019-02-26 NOTE — PROGRESS NOTES
Name: Jesi Mcginnis  Clinic Number: [id number]  Date of Treatment: 02/26/2019   Diagnosis:   Encounter Diagnosis   Name Primary?    Neck pain        Time in: 1600  Time Out: 1700  Total Treatment Time: 60  Group Time: 0      Subjective:    Jesi reports improvement of symptoms.  Patient reports their pain to be 4/10 on a 0-10 scale with 0 being no pain and 10 being the worst pain imaginable.    Objective    Patient received intermittent cervical traction x 15 min at 10# max/ 5# min, 30 sec on/ 10 sec off using..      Dry needling was performed to the patient's left upper trapezius with 60 mm needles for 15 minutes with IMS. No increase pain was the result of the procedure.    Patient received individual therapy to increase strength, endurance and ROM with 0 patients with activities as follows:     Jesi received therapeutic exercises to develop strength, endurance and posture for 30 minutes including:     UBE 5 forward/backward  Anterior chest wall stretch 10 x 10 sec  Cable column extension 3 x 10  3#  B  Cable column adduction 3 x 10  3#  B  Cable column mid row 3 x 10 3#      Assessment:       Pt will continue to benefit from skilled PT intervention. Medical Necessity is demonstrated by:  Requires skilled supervision to complete and progress HEP.    Patient is making fair progress towards established goals.    New/Revised goals: na      Plan:  Continue with established Plan of Care towards PT goals.

## 2019-03-07 ENCOUNTER — CLINICAL SUPPORT (OUTPATIENT)
Dept: REHABILITATION | Facility: HOSPITAL | Age: 59
End: 2019-03-07
Payer: COMMERCIAL

## 2019-03-07 DIAGNOSIS — M54.2 NECK PAIN: ICD-10-CM

## 2019-03-07 PROCEDURE — 97110 THERAPEUTIC EXERCISES: CPT | Mod: PN | Performed by: PHYSICAL THERAPIST

## 2019-03-07 PROCEDURE — 97012 MECHANICAL TRACTION THERAPY: CPT | Mod: PN | Performed by: PHYSICAL THERAPIST

## 2019-03-07 PROCEDURE — 97140 MANUAL THERAPY 1/> REGIONS: CPT | Mod: PN | Performed by: PHYSICAL THERAPIST

## 2019-03-08 NOTE — PROGRESS NOTES
Name: Jesi Mcginnis  Clinic Number: [id number]  Date of Treatment: 03/07/2019   Diagnosis:   Encounter Diagnosis   Name Primary?    Neck pain        Time in: 1600  Time Out: 1700  Total Treatment Time: 60  Group Time: 0      Subjective:    Jesi reports decreased pain.  Patient reports their pain to be 3/10 on a 0-10 scale with 0 being no pain and 10 being the worst pain imaginable.    Objective      Patient received intermittent cervical traction x 15 min at 10# max/ 5# min, 30 sec on/ 10 sec off using..      Dry needling was performed to the patient's left upper trapezius with 60 mm needles for 15 minutes with IMS. No increase pain was the result of the procedure.    Patient received individual therapy to increase strength, ROM, flexibility and posture with 0 patients with activities as follows:     Jesi received therapeutic exercises to develop strength, ROM, flexibility and posture for 30 minutes including:      UBE 5 forward/backward  Anterior chest wall stretch 10 x 10 sec  Cable column extension 3 x 10  3#  B  Cable column adduction 3 x 10  3#  B  Cable column mid row 3 x 10 3#        Assessment:       Pt will continue to benefit from skilled PT intervention. Medical Necessity is demonstrated by:  Unable to participate fully in daily activities and Requires skilled supervision to complete and progress HEP.    Patient is making fair progress towards established goals.    New/Revised goals: na      Plan:  Continue with established Plan of Care towards PT goals.

## 2019-03-12 ENCOUNTER — PATIENT MESSAGE (OUTPATIENT)
Dept: NEUROSURGERY | Facility: CLINIC | Age: 59
End: 2019-03-12

## 2019-03-18 ENCOUNTER — INITIAL CONSULT (OUTPATIENT)
Dept: NEUROSURGERY | Facility: CLINIC | Age: 59
End: 2019-03-18
Payer: COMMERCIAL

## 2019-03-18 VITALS — WEIGHT: 149.25 LBS | HEIGHT: 65 IN | BODY MASS INDEX: 24.87 KG/M2 | RESPIRATION RATE: 18 BRPM

## 2019-03-18 DIAGNOSIS — M50.121 CERVICAL DISC DISORDER AT C4-C5 LEVEL WITH RADICULOPATHY: ICD-10-CM

## 2019-03-18 DIAGNOSIS — M50.30 DEGENERATION OF CERVICAL DISC WITHOUT MYELOPATHY: Primary | ICD-10-CM

## 2019-03-18 PROCEDURE — 3008F BODY MASS INDEX DOCD: CPT | Mod: CPTII,S$GLB,, | Performed by: NEUROLOGICAL SURGERY

## 2019-03-18 PROCEDURE — 99214 OFFICE O/P EST MOD 30 MIN: CPT | Mod: S$GLB,,, | Performed by: NEUROLOGICAL SURGERY

## 2019-03-18 PROCEDURE — 99999 PR PBB SHADOW E&M-EST. PATIENT-LVL III: CPT | Mod: PBBFAC,,, | Performed by: NEUROLOGICAL SURGERY

## 2019-03-18 PROCEDURE — 3008F PR BODY MASS INDEX (BMI) DOCUMENTED: ICD-10-PCS | Mod: CPTII,S$GLB,, | Performed by: NEUROLOGICAL SURGERY

## 2019-03-18 PROCEDURE — 99214 PR OFFICE/OUTPT VISIT, EST, LEVL IV, 30-39 MIN: ICD-10-PCS | Mod: S$GLB,,, | Performed by: NEUROLOGICAL SURGERY

## 2019-03-18 PROCEDURE — 99999 PR PBB SHADOW E&M-EST. PATIENT-LVL III: ICD-10-PCS | Mod: PBBFAC,,, | Performed by: NEUROLOGICAL SURGERY

## 2019-03-18 RX ORDER — TRAMADOL HYDROCHLORIDE 50 MG/1
1 TABLET ORAL 3 TIMES DAILY PRN
Refills: 2 | Status: ON HOLD | COMMUNITY
Start: 2019-03-12 | End: 2019-05-24 | Stop reason: HOSPADM

## 2019-03-18 RX ORDER — GABAPENTIN 600 MG/1
600 TABLET ORAL 3 TIMES DAILY
Refills: 2 | COMMUNITY
Start: 2019-03-12 | End: 2022-07-28 | Stop reason: SDUPTHER

## 2019-03-18 RX ORDER — CYCLOBENZAPRINE HCL 5 MG
5 TABLET ORAL 3 TIMES DAILY PRN
Refills: 3 | COMMUNITY
Start: 2019-03-13 | End: 2019-03-20 | Stop reason: SDUPTHER

## 2019-03-18 RX ORDER — HYDROCODONE BITARTRATE AND ACETAMINOPHEN 7.5; 325 MG/1; MG/1
1 TABLET ORAL
Refills: 0 | COMMUNITY
Start: 2019-03-14 | End: 2019-05-20 | Stop reason: CLARIF
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
## 2019-03-18 NOTE — LETTER
March 18, 2019      Matt Peralta MD  [address]  [address]  [place] [address]           [facility]  [address] [facility] [address] [place] [address]  Phone: [phone]  Fax: [phone]          Patient: Jesi Mcginnis   MR Number: [id number]   YOB: 1960   Date of Visit: 3/18/2019       Dear Dr. Matt Peralta:    Thank you for referring Jesi Mcginnis to me for evaluation. Attached you will find relevant portions of my assessment and plan of care.    If you have questions, please do not hesitate to call me. I look forward to following Jesi Mcginnis along with you.    Sincerely,    Marco A Cooley MD    Enclosure  CC:  No Recipients    If you would like to receive this communication electronically, please contact [email or web address] or [phone] to request more information on [employer] access.    For providers and/or their staff who would like to refer a patient to [facility], please contact us through our one-stop-shop provider referral line, [facility], at [phone].    If you feel you have received this communication in error or would no longer like to receive these types of communications, please e-mail [email or web address]

## 2019-03-20 DIAGNOSIS — M54.2 NECK PAIN: ICD-10-CM

## 2019-03-20 RX ORDER — CYCLOBENZAPRINE HCL 5 MG
5 TABLET ORAL 3 TIMES DAILY PRN
Qty: 30 TABLET | Refills: 3 | Status: SHIPPED | OUTPATIENT
Start: 2019-03-20 | End: 2019-03-30

## 2019-03-26 ENCOUNTER — PATIENT MESSAGE (OUTPATIENT)
Dept: NEUROSURGERY | Facility: CLINIC | Age: 59
End: 2019-03-26

## 2019-03-26 ENCOUNTER — PATIENT MESSAGE (OUTPATIENT)
Dept: PULMONOLOGY | Facility: CLINIC | Age: 59
End: 2019-03-26

## 2019-03-26 ENCOUNTER — CLINICAL SUPPORT (OUTPATIENT)
Dept: REHABILITATION | Facility: HOSPITAL | Age: 59
End: 2019-03-26
Payer: COMMERCIAL

## 2019-03-26 DIAGNOSIS — M54.2 NECK PAIN: ICD-10-CM

## 2019-03-26 PROCEDURE — 97110 THERAPEUTIC EXERCISES: CPT | Mod: PN | Performed by: PHYSICAL THERAPIST

## 2019-03-26 NOTE — PROGRESS NOTES
Name: Jesi Mcginnis  Clinic Number: [id number]  Date of Treatment: 03/26/2019   Diagnosis:   Encounter Diagnosis   Name Primary?    Neck pain        Time in: 1608  Time Out: 1702  Total Treatment Time: 54  Group Time: 0      Subjective:    Jesi reports she will be having surgery in May.  Patient reports their pain to be 4/10 on a 0-10 scale with 0 being no pain and 10 being the worst pain imaginable.    Objective    Patient received individual therapy to increase strength with 0 patients with activities as follows:     Jesi received therapeutic exercises to develop strength, endurance and posture for 54 minutes including:     UBE 5 forward/backward  Anterior chest wall stretch 10 x 10 sec  Cable column extension 3 x 10  3#  B  Cable column adduction 3 x 10  3#  B  Cable column mid row 3 x 10 3#   Cable column ER 3 x 10 3#  Scapular retraction 3 x 10 GTB  Upper trap stretch 10 x 10 sec  Chin tuck 3 x 10  CROM in all planes 3 x 10 each      Assessment:       Pt will continue to benefit from skilled PT intervention. Medical Necessity is demonstrated by:  Requires skilled supervision to complete and progress HEP and Weakness.    Patient is making fair progress towards established goals.    New/Revised goals: see updated plan of care      Plan:  Continue with established Plan of Care towards PT goals.

## 2019-03-26 NOTE — PLAN OF CARE
TIME RECORD    Date: 03/26/2019    Start Time:  1608  Stop Time:  1502    PROCEDURES:    TIMED  Procedure Time Min.    Start:  Stop:     Start:  Stop:     Start:  Stop:     Start:  Stop:          UNTIMED  Procedure Time Min.    Start:  Stop:     Start:  Stop:      Total Timed Minutes:  54  Total Timed Units:  4  Total Untimed Units:  0  Charges Billed/# of units:  4    PHYSICAL THERAPY UPDATED PLAN OF TREATMENT    Patient name: Jesi Mcginnis  Onset Date:  12/29/2018  SOC Date:  1/17/2019  Primary Diagnosis:    1. Neck pain       Treatment Diagnosis:  Cervical pain  Certification Period:  1/17/2019 to 3/15/2019  Precautions:  Standard  Visits from SOC:  8  Functional Level Prior to SOC:  Decreased ability to perform ADL    Updated Assessment:    S: Patient reports she will have an ACDF in May and would like to continue PT to increase her UE strength prior to the procedure.    O:  CROM:  Previous  Current  Flexion   60*   66*  Extension  42*   50*  Left side bending  12*   28*  Right side bending 22*   30*  Left rotation  26   48*  Right rotation  40*   56*    MMT:   Left Right  Left Right  Shoulder flexion 4/5 4/5  4+/5 4+/5   Shoulder abduction 4/5 4/5  4+/5 4+/5  Shoulder ER  4-/5 4-/5  4/5 4/5  Scapular retraction 4-/5 4-/5  4/5 4/5  Elbow flexion  4+/5 4+/5  4+/5 4+/5  Elbow extension 4+/5 4+/5  4+/5 4+/5  Wrist flexion  4+/5 4+/5  4+/5 4+/5  Wrist extension 4+/5 4+/5  4+/5 4+/5       NDI:   58% impairment 50% impairment    A: The patient is progressing with increased UE strength and would benefit from the continuation of PT to progress until surgery    Previous Short Term Goals Status:     1. Patient will be begin a written HEP  2. Decrease soft tissue tenderness to moderate  3. Decrease pain at worst to 5/10    Long Term Goal Status:   continue per initial plan of care.  1. Patient will be independent with a written HEP  2. Decrease soft tissue tenderness to mild  3. Decrease pain at worst to 2/10  4.  Decrease NDI impairment to <20%  5. Increase CROM to WFL  6. Increase shoulder strength to 4+/5    Reasons for Recertification of Therapy:   Progress UE strength    Certification Period: 3/28/2019 to 5/3/2019  Recommended Treatment Plan: 2 times per week for 4 weeks: Manual Therapy, Moist Heat/ Ice, Therapeutic Activites and Therapeutic Exercise  Other Recommendations: Progress as tolerated        Therapist's Name: Pradeep Valencia, PT   Date: 03/26/2019    I CERTIFY THE NEED FOR THESE SERVICES FURNISHED UNDER THIS PLAN OF TREATMENT AND WHILE UNDER MY CARE    Physician's comments: ________________________________________________________________________________________________________________________________________________      Physician's Name: ___________________________________

## 2019-03-27 ENCOUNTER — TELEPHONE (OUTPATIENT)
Dept: NEUROSURGERY | Facility: CLINIC | Age: 59
End: 2019-03-27

## 2019-03-27 DIAGNOSIS — M50.121 CERVICAL DISC DISORDER AT C4-C5 LEVEL WITH RADICULOPATHY: ICD-10-CM

## 2019-03-27 DIAGNOSIS — M50.30 DEGENERATION OF CERVICAL DISC WITHOUT MYELOPATHY: Primary | ICD-10-CM

## 2019-03-27 RX ORDER — SODIUM CHLORIDE, SODIUM LACTATE, POTASSIUM CHLORIDE, CALCIUM CHLORIDE 600; 310; 30; 20 MG/100ML; MG/100ML; MG/100ML; MG/100ML
INJECTION, SOLUTION INTRAVENOUS CONTINUOUS
Status: CANCELLED | OUTPATIENT
Start: 2019-03-27

## 2019-03-27 RX ORDER — LIDOCAINE HYDROCHLORIDE 10 MG/ML
1 INJECTION, SOLUTION EPIDURAL; INFILTRATION; INTRACAUDAL; PERINEURAL ONCE
Status: CANCELLED | OUTPATIENT
Start: 2019-03-27 | End: 2019-03-27

## 2019-03-27 NOTE — TELEPHONE ENCOUNTER
To Whom It May Concern,    Jesi Cho will be having surgery on 5/23/19 with Dr. Cooley, Neurosurgeon, at [facility]. We are reaching out to obtain a surgical clearance from Dr. Tucker and Dr. Peralta to ensure the safety of our patient. The surgery is a cervical discectomy and fusion and will be under general anesthesia. This procedure typically lasts approximately 3-4 hours. We request that the patient hold all anticoagulant/antiinflammatory medications for 5-7 days prior to surgery. Please let us know if our office can be of further assistance.     Thank you,     MAURILIO Morgan LPN  [phone]  F: [phone]

## 2019-03-28 ENCOUNTER — DOCUMENTATION ONLY (OUTPATIENT)
Dept: FAMILY MEDICINE | Facility: CLINIC | Age: 59
End: 2019-03-28

## 2019-03-28 ENCOUNTER — CLINICAL SUPPORT (OUTPATIENT)
Dept: REHABILITATION | Facility: HOSPITAL | Age: 59
End: 2019-03-28
Payer: COMMERCIAL

## 2019-03-28 DIAGNOSIS — M54.2 NECK PAIN: ICD-10-CM

## 2019-03-28 PROCEDURE — 97110 THERAPEUTIC EXERCISES: CPT | Mod: PN | Performed by: PHYSICAL THERAPIST

## 2019-03-28 NOTE — PROGRESS NOTES
Name: Jesi Mcginnis  Clinic Number: [id number]  Date of Treatment: 03/28/2019   Diagnosis:   Encounter Diagnosis   Name Primary?    Neck pain        Time in: 1607  Time Out: 1700  Total Treatment Time: 53  Group Time: 0      Subjective:    Jesi reports continued left cervical pain.  Patient reports their pain to be 3/10 on a 0-10 scale with 0 being no pain and 10 being the worst pain imaginable.    Objective    Patient received individual therapy to increase strength, endurance, ROM and posture with 0 patients with activities as follows:     Jesi received therapeutic exercises to develop strength, endurance, ROM and posture for 53 minutes including:     UBE 5 forward/backward  Anterior chest wall stretch 10 x 10 sec  Cable column extension 3 x 10  3#  B  Cable column adduction 3 x 10  3#  B  Cable column mid row 3 x 10 3#   Cable column ER 3 x 10 3#  Cable column lat pul down 3 x 10 3#  Scapular retraction 3 x 10 GTB  Upper trap stretch 10 x 10 sec  Chin tuck 3 x 10  CROM in all planes 3 x 10 each     Assessment:       Pt will continue to benefit from skilled PT intervention. Medical Necessity is demonstrated by:  Requires skilled supervision to complete and progress HEP and Weakness.    Patient is making good progress towards established goals.    New/Revised goals: na      Plan:  Continue with established Plan of Care towards PT goals.

## 2019-03-28 NOTE — PROGRESS NOTES
Pre-Visit Chart Review  For Appointment Scheduled on 3/29/19    Health Maintenance Due   Topic Date Due    TETANUS VACCINE  07[birth year]11[birth year]1980

## 2019-03-29 ENCOUNTER — PATIENT MESSAGE (OUTPATIENT)
Dept: PULMONOLOGY | Facility: CLINIC | Age: 59
End: 2019-03-29

## 2019-03-29 ENCOUNTER — LAB VISIT (OUTPATIENT)
Dept: LAB | Facility: HOSPITAL | Age: 59
End: 2019-03-29
Attending: FAMILY MEDICINE
Payer: COMMERCIAL

## 2019-03-29 ENCOUNTER — HOSPITAL ENCOUNTER (OUTPATIENT)
Dept: RADIOLOGY | Facility: CLINIC | Age: 59
Discharge: HOME | End: 2019-03-29
Attending: FAMILY MEDICINE
Payer: COMMERCIAL

## 2019-03-29 ENCOUNTER — OFFICE VISIT (OUTPATIENT)
Dept: FAMILY MEDICINE | Facility: CLINIC | Age: 59
End: 2019-03-29
Payer: COMMERCIAL

## 2019-03-29 VITALS
WEIGHT: 145.94 LBS | HEIGHT: 65 IN | BODY MASS INDEX: 24.32 KG/M2 | TEMPERATURE: 98 F | DIASTOLIC BLOOD PRESSURE: 67 MMHG | SYSTOLIC BLOOD PRESSURE: 99 MMHG | HEART RATE: 77 BPM

## 2019-03-29 DIAGNOSIS — M50.121 CERVICAL DISC DISORDER AT C4-C5 LEVEL WITH RADICULOPATHY: ICD-10-CM

## 2019-03-29 DIAGNOSIS — Z01.818 PRE-OP EVALUATION: ICD-10-CM

## 2019-03-29 DIAGNOSIS — N30.00 ACUTE CYSTITIS WITHOUT HEMATURIA: ICD-10-CM

## 2019-03-29 DIAGNOSIS — Z00.00 HEALTHCARE MAINTENANCE: Primary | ICD-10-CM

## 2019-03-29 DIAGNOSIS — N39.0 RECURRENT UTI: ICD-10-CM

## 2019-03-29 LAB
ALBUMIN SERPL BCP-MCNC: 4 G/DL (ref 3.5–5.2)
ALP SERPL-CCNC: 50 U/L (ref 55–135)
ALT SERPL W/O P-5'-P-CCNC: 20 U/L (ref 10–44)
ANION GAP SERPL CALC-SCNC: 12 MMOL/L (ref 8–16)
AST SERPL-CCNC: 29 U/L (ref 10–40)
BASOPHILS # BLD AUTO: 0.03 K/UL (ref 0–0.2)
BASOPHILS NFR BLD: 0.7 % (ref 0–1.9)
BILIRUB SERPL-MCNC: 0.4 MG/DL (ref 0.1–1)
BUN SERPL-MCNC: 11 MG/DL (ref 6–20)
CALCIUM SERPL-MCNC: 9.6 MG/DL (ref 8.7–10.5)
CHLORIDE SERPL-SCNC: 105 MMOL/L (ref 95–110)
CO2 SERPL-SCNC: 24 MMOL/L (ref 23–29)
CREAT SERPL-MCNC: 0.8 MG/DL (ref 0.5–1.4)
DIFFERENTIAL METHOD: ABNORMAL
EOSINOPHIL # BLD AUTO: 0.1 K/UL (ref 0–0.5)
EOSINOPHIL NFR BLD: 1.2 % (ref 0–8)
ERYTHROCYTE [DISTWIDTH] IN BLOOD BY AUTOMATED COUNT: 12.4 % (ref 11.5–14.5)
EST. GFR  (AFRICAN AMERICAN): >60 ML/MIN/1.73 M^2
EST. GFR  (NON AFRICAN AMERICAN): >60 ML/MIN/1.73 M^2
GLUCOSE SERPL-MCNC: 83 MG/DL (ref 70–110)
HCT VFR BLD AUTO: 38.8 % (ref 37–48.5)
HGB BLD-MCNC: 12.9 G/DL (ref 12–16)
IMM GRANULOCYTES # BLD AUTO: 0.02 K/UL (ref 0–0.04)
IMM GRANULOCYTES NFR BLD AUTO: 0.5 % (ref 0–0.5)
INR PPP: 0.9 (ref 0.8–1.2)
LYMPHOCYTES # BLD AUTO: 1.4 K/UL (ref 1–4.8)
LYMPHOCYTES NFR BLD: 32.5 % (ref 18–48)
MCH RBC QN AUTO: 34.7 PG (ref 27–31)
MCHC RBC AUTO-ENTMCNC: 33.2 G/DL (ref 32–36)
MCV RBC AUTO: 104 FL (ref 82–98)
MONOCYTES # BLD AUTO: 0.3 K/UL (ref 0.3–1)
MONOCYTES NFR BLD: 8 % (ref 4–15)
NEUTROPHILS # BLD AUTO: 2.4 K/UL (ref 1.8–7.7)
NEUTROPHILS NFR BLD: 57.1 % (ref 38–73)
NRBC BLD-RTO: 0 /100 WBC
PLATELET # BLD AUTO: 235 K/UL (ref 150–350)
PMV BLD AUTO: 11.4 FL (ref 9.2–12.9)
POTASSIUM SERPL-SCNC: 4.1 MMOL/L (ref 3.5–5.1)
PROT SERPL-MCNC: 7.5 G/DL (ref 6–8.4)
PROTHROMBIN TIME: 9.9 SEC (ref 9–12.5)
RBC # BLD AUTO: 3.72 M/UL (ref 4–5.4)
SODIUM SERPL-SCNC: 141 MMOL/L (ref 136–145)
WBC # BLD AUTO: 4.24 K/UL (ref 3.9–12.7)

## 2019-03-29 PROCEDURE — 3074F SYST BP LT 130 MM HG: CPT | Mod: CPTII,S$GLB,, | Performed by: FAMILY MEDICINE

## 2019-03-29 PROCEDURE — 93005 EKG 12-LEAD: ICD-10-PCS | Mod: S$GLB,,, | Performed by: FAMILY MEDICINE

## 2019-03-29 PROCEDURE — 93010 ELECTROCARDIOGRAM REPORT: CPT | Mod: S$GLB,,, | Performed by: INTERNAL MEDICINE

## 2019-03-29 PROCEDURE — 99396 PREV VISIT EST AGE 40-64: CPT | Mod: 25,S$GLB,, | Performed by: FAMILY MEDICINE

## 2019-03-29 PROCEDURE — 93005 ELECTROCARDIOGRAM TRACING: CPT | Mod: S$GLB,,, | Performed by: FAMILY MEDICINE

## 2019-03-29 PROCEDURE — 93010 EKG 12-LEAD: ICD-10-PCS | Mod: S$GLB,,, | Performed by: INTERNAL MEDICINE

## 2019-03-29 PROCEDURE — 36415 COLL VENOUS BLD VENIPUNCTURE: CPT | Mod: PO

## 2019-03-29 PROCEDURE — 99999 PR PBB SHADOW E&M-EST. PATIENT-LVL IV: ICD-10-PCS | Mod: PBBFAC,,, | Performed by: FAMILY MEDICINE

## 2019-03-29 PROCEDURE — 80053 COMPREHEN METABOLIC PANEL: CPT

## 2019-03-29 PROCEDURE — 3078F DIAST BP <80 MM HG: CPT | Mod: CPTII,S$GLB,, | Performed by: FAMILY MEDICINE

## 2019-03-29 PROCEDURE — 3074F PR MOST RECENT SYSTOLIC BLOOD PRESSURE < 130 MM HG: ICD-10-PCS | Mod: CPTII,S$GLB,, | Performed by: FAMILY MEDICINE

## 2019-03-29 PROCEDURE — 85025 COMPLETE CBC W/AUTO DIFF WBC: CPT

## 2019-03-29 PROCEDURE — 85610 PROTHROMBIN TIME: CPT

## 2019-03-29 PROCEDURE — 3078F PR MOST RECENT DIASTOLIC BLOOD PRESSURE < 80 MM HG: ICD-10-PCS | Mod: CPTII,S$GLB,, | Performed by: FAMILY MEDICINE

## 2019-03-29 PROCEDURE — 71046 X-RAY EXAM CHEST 2 VIEWS: CPT | Mod: TC,FY,PO

## 2019-03-29 PROCEDURE — 71046 XR CHEST PA AND LATERAL: ICD-10-PCS | Mod: 26,,, | Performed by: RADIOLOGY

## 2019-03-29 PROCEDURE — 99214 PR OFFICE/OUTPT VISIT, EST, LEVL IV, 30-39 MIN: ICD-10-PCS | Mod: 25,S$GLB,, | Performed by: FAMILY MEDICINE

## 2019-03-29 PROCEDURE — 71046 X-RAY EXAM CHEST 2 VIEWS: CPT | Mod: 26,,, | Performed by: RADIOLOGY

## 2019-03-29 PROCEDURE — 99214 OFFICE O/P EST MOD 30 MIN: CPT | Mod: 25,S$GLB,, | Performed by: FAMILY MEDICINE

## 2019-03-29 PROCEDURE — 99396 PR PREVENTIVE VISIT,EST,40-64: ICD-10-PCS | Mod: 25,S$GLB,, | Performed by: FAMILY MEDICINE

## 2019-03-29 PROCEDURE — 99999 PR PBB SHADOW E&M-EST. PATIENT-LVL IV: CPT | Mod: PBBFAC,,, | Performed by: FAMILY MEDICINE

## 2019-03-29 RX ORDER — NITROFURANTOIN 25; 75 MG/1; MG/1
100 CAPSULE ORAL 2 TIMES DAILY
Qty: 10 CAPSULE | Refills: 0 | Status: SHIPPED | OUTPATIENT
Start: 2019-03-29 | End: 2019-04-03

## 2019-03-29 RX ORDER — CIPROFLOXACIN 250 MG/1
TABLET, FILM COATED ORAL
Qty: 15 TABLET | Refills: 2 | Status: SHIPPED | OUTPATIENT
Start: 2019-03-29 | End: 2019-09-26

## 2019-03-29 NOTE — PROGRESS NOTES
Subjective:   Patient ID: Jesi Mcginnis is a 58 y.o. female     Chief Complaint:Annual Exam      Patient here for annual checkup in to be cleared for surgery.  Patient doing well today.  Patient does note neck pain which is constant and is why she is being preop today.  Patient denies any previous issues with anesthesia.  Patient denies any history of heart disease.  Patient states she is able to go up a flight of stairs without getting short of breath.    Review of Systems   Constitutional: Negative for activity change and unexpected weight change.   HENT: Negative for hearing loss, rhinorrhea and trouble swallowing.    Eyes: Negative for discharge and visual disturbance.   Respiratory: Negative for chest tightness and wheezing.    Cardiovascular: Negative for chest pain and palpitations.   Gastrointestinal: Negative for blood in stool, constipation, diarrhea and vomiting.   Endocrine: Negative for polydipsia and polyuria.   Genitourinary: Negative for difficulty urinating, hematuria and menstrual problem.   Musculoskeletal: Positive for neck pain. Negative for arthralgias and joint swelling.   Neurological: Negative for weakness and headaches.   Psychiatric/Behavioral: Negative for confusion and dysphoric mood.     No past medical history on file.  Objective:     Vitals:    03/29/19 0731   BP: 99/67   Pulse: 77   Temp: 97.7 °F (36.5 °C)     Body mass index is 24.29 kg/m².  Physical Exam   Constitutional: No distress.   HENT:   Head: Normocephalic and atraumatic.   Eyes: EOM are normal.   Cardiovascular: Normal rate and normal heart sounds.   Pulmonary/Chest: Effort normal.   Abdominal: Bowel sounds are normal.   Musculoskeletal: Normal range of motion.   Neurological: She is alert.   Psychiatric: She has a normal mood and affect.     Assessment:     1. Healthcare maintenance    2. Cervical disc disorder at C4-C5 level with radiculopathy    3. Pre-op evaluation    4. Acute cystitis without hematuria    5.  Recurrent UTI      Plan:   Healthcare Maintenance  Current patient on maintenance is up-to-date.  Would recommend repeating work including cholesterol in 6 months.    Pre-op evaluation  -     CBC auto differential; Future; Expected date: 03/29/2019  -     Comprehensive metabolic panel; Future; Expected date: 03/29/2019  -     Protime-INR; Future; Expected date: 03/29/2019  -     Urinalysis; Future; Expected date: 03/29/2019  -     X-Ray Chest PA And Lateral; Future; Expected date: 03/29/2019  -     IN OFFICE EKG 12-LEAD (to Muse)  Patient is low risk for high-risk surgery.  EKG did not show any ST changes.  Reviewed blood work and chest x-ray which also did not show any acute findings.  Patient is low risk for high-risk surgery.  Patient is currently medically optimized.    Cervical disc disorder at C4-C5 level with radiculopathy  -     CBC auto differential; Future; Expected date: 03/29/2019  -     Comprehensive metabolic panel; Future; Expected date: 03/29/2019  -     Protime-INR; Future; Expected date: 03/29/2019  -     Urinalysis; Future; Expected date: 03/29/2019  -     X-Ray Chest PA And Lateral; Future; Expected date: 03/29/2019    Acute cystitis without hematuria  -     nitrofurantoin, macrocrystal-monohydrate, (MACROBID) 100 MG capsule; Take 1 capsule (100 mg total) by mouth 2 (two) times daily. for 5 days  Dispense: 10 capsule; Refill: 0  -     Urine culture; Future; Expected date: 03/29/2019    Recurrent UTI  Secondary to remain cystitis.  Will start patient on Cipro 125 mg post core early and have called in a prescription to be started after complaining her current treatment regimen for her current urinary tract infection      Time spent with patient: 30 minutes and over half of that time was spent on counseling an coordination of care.    Westley Tucker MD  03/29/2019    Portions of this note have been dictated with SONYA Mclaughlin

## 2019-03-30 ENCOUNTER — PATIENT MESSAGE (OUTPATIENT)
Dept: FAMILY MEDICINE | Facility: CLINIC | Age: 59
End: 2019-03-30

## 2019-03-30 ENCOUNTER — HOSPITAL ENCOUNTER (OUTPATIENT)
Dept: RADIOLOGY | Facility: HOSPITAL | Age: 59
Discharge: HOME | End: 2019-03-30
Attending: PAIN MEDICINE
Payer: COMMERCIAL

## 2019-03-30 DIAGNOSIS — M54.12 BRACHIAL NEURITIS: ICD-10-CM

## 2019-03-30 PROCEDURE — 72141 MRI NECK SPINE W/O DYE: CPT | Mod: TC

## 2019-03-30 PROCEDURE — 72141 MRI CERVICAL SPINE WITHOUT CONTRAST: ICD-10-PCS | Mod: 26,,, | Performed by: RADIOLOGY

## 2019-03-30 PROCEDURE — 72141 MRI NECK SPINE W/O DYE: CPT | Mod: 26,,, | Performed by: RADIOLOGY

## 2019-04-22 ENCOUNTER — CLINICAL SUPPORT (OUTPATIENT)
Dept: REHABILITATION | Facility: HOSPITAL | Age: 59
End: 2019-04-22
Payer: COMMERCIAL

## 2019-04-22 DIAGNOSIS — M54.2 NECK PAIN: ICD-10-CM

## 2019-04-22 PROCEDURE — 97140 MANUAL THERAPY 1/> REGIONS: CPT | Mod: PN | Performed by: PHYSICAL THERAPIST

## 2019-04-22 PROCEDURE — 97110 THERAPEUTIC EXERCISES: CPT | Mod: PN | Performed by: PHYSICAL THERAPIST

## 2019-04-22 NOTE — PROGRESS NOTES
Name: Jesi Mcginnis  Clinic Number: [id number]  Date of Treatment: 04/22/2019   Diagnosis:   Encounter Diagnosis   Name Primary?    Neck pain        Time in: 1506  Time Out: 1600  Total Treatment Time: 54  Group Time: 0      Subjective:    Jesi no new complaints.  Patient reports their pain to be 3/10 on a 0-10 scale with 0 being no pain and 10 being the worst pain imaginable.    Objective    Patient received individual therapy to increase strength, endurance, ROM and posture with 0 patients with activities as follows:     Jesi received therapeutic exercises to develop strength, endurance, ROM and posture for 45 minutes including:     UBE 5 forward/backward  Anterior chest wall stretch 10 x 10 sec  Cable column extension 3 x 10  3#  B  Cable column adduction 3 x 10  3#  B  Cable column mid row 3 x 10 3#   Cable column ER 3 x 10 3#  Cable column lat pul down 3 x 10 3#  Scapular retraction 3 x 10 GTB  Upper trap stretch 10 x 10 sec  Chin tuck 3 x 10  CROM in all planes 3 x 10 each    IASTM to the bilateral sternocleidomastoid  for 9 minutes     Assessment:       Pt will continue to benefit from skilled PT intervention. Medical Necessity is demonstrated by:  Requires skilled supervision to complete and progress HEP and Weakness.    Patient is making good progress towards established goals.    New/Revised goals: na      Plan:  Continue with established Plan of Care towards PT goals.

## 2019-04-24 ENCOUNTER — CLINICAL SUPPORT (OUTPATIENT)
Dept: REHABILITATION | Facility: HOSPITAL | Age: 59
End: 2019-04-24
Payer: COMMERCIAL

## 2019-04-24 ENCOUNTER — TELEPHONE (OUTPATIENT)
Dept: REHABILITATION | Facility: HOSPITAL | Age: 59
End: 2019-04-24

## 2019-04-24 DIAGNOSIS — M54.2 NECK PAIN: ICD-10-CM

## 2019-04-24 PROCEDURE — 97140 MANUAL THERAPY 1/> REGIONS: CPT | Mod: PN | Performed by: PHYSICAL THERAPIST

## 2019-04-24 PROCEDURE — 97110 THERAPEUTIC EXERCISES: CPT | Mod: PN | Performed by: PHYSICAL THERAPIST

## 2019-04-24 NOTE — PROGRESS NOTES
Name: Jesi Mcginnis  Clinic Number: [id number]  Date of Treatment: 04/24/2019   Diagnosis:   Encounter Diagnosis   Name Primary?    Neck pain        Time in: 1527  Time Out: 1620  Total Treatment Time: 53  Group Time: 0      Subjective:    Jesi no new complaints.  Patient reports their pain to be 3/10 on a 0-10 scale with 0 being no pain and 10 being the worst pain imaginable.    Objective    Patient received individual therapy to increase strength, endurance, ROM and posture with 0 patients with activities as follows:     Jesi received therapeutic exercises to develop strength, endurance, ROM and posture for 45 minutes including:     UBE 5 forward/backward  Anterior chest wall stretch 10 x 10 sec  Cable column extension 3 x 10  3#  B  Cable column adduction 3 x 10  3#  B  Cable column mid row 3 x 10 3#   Cable column ER 3 x 10 3#  Cable column lat pul down 3 x 10 3#  Scapular retraction 3 x 10 GTB  Upper trap stretch 10 x 10 sec  Chin tuck 3 x 10  CROM in all planes 3 x 10 each    IASTM to the bilateral sternocleidomastoid  for 8 minutes     Assessment:       Pt will continue to benefit from skilled PT intervention. Medical Necessity is demonstrated by:  Requires skilled supervision to complete and progress HEP and Weakness.    Patient is making good progress towards established goals.    New/Revised goals: na      Plan:  Continue with established Plan of Care towards PT goals.

## 2019-04-24 NOTE — TELEPHONE ENCOUNTER
Called PT to advise of missed appt. PT states she is coming to appt even though it is already 15 minutes after appt time. -DIOMEDESB

## 2019-04-29 ENCOUNTER — OFFICE VISIT (OUTPATIENT)
Dept: PULMONOLOGY | Facility: CLINIC | Age: 59
End: 2019-04-29
Payer: COMMERCIAL

## 2019-04-29 ENCOUNTER — CLINICAL SUPPORT (OUTPATIENT)
Dept: REHABILITATION | Facility: HOSPITAL | Age: 59
End: 2019-04-29
Payer: COMMERCIAL

## 2019-04-29 ENCOUNTER — PATIENT MESSAGE (OUTPATIENT)
Dept: NEUROSURGERY | Facility: CLINIC | Age: 59
End: 2019-04-29

## 2019-04-29 VITALS
HEART RATE: 72 BPM | DIASTOLIC BLOOD PRESSURE: 88 MMHG | WEIGHT: 146.81 LBS | SYSTOLIC BLOOD PRESSURE: 163 MMHG | BODY MASS INDEX: 24.46 KG/M2 | HEIGHT: 65 IN | OXYGEN SATURATION: 99 %

## 2019-04-29 DIAGNOSIS — Z87.891 HISTORY OF TOBACCO ABUSE: Primary | ICD-10-CM

## 2019-04-29 DIAGNOSIS — J85.1 ABSCESS OF RIGHT LUNG WITH PNEUMONIA, UNSPECIFIED PART OF LUNG: ICD-10-CM

## 2019-04-29 DIAGNOSIS — M54.2 NECK PAIN: ICD-10-CM

## 2019-04-29 PROCEDURE — 99213 PR OFFICE/OUTPT VISIT, EST, LEVL III, 20-29 MIN: ICD-10-PCS | Mod: S$GLB,,, | Performed by: INTERNAL MEDICINE

## 2019-04-29 PROCEDURE — 3077F PR MOST RECENT SYSTOLIC BLOOD PRESSURE >= 140 MM HG: ICD-10-PCS | Mod: CPTII,S$GLB,, | Performed by: INTERNAL MEDICINE

## 2019-04-29 PROCEDURE — 97140 MANUAL THERAPY 1/> REGIONS: CPT | Mod: PN | Performed by: PHYSICAL THERAPIST

## 2019-04-29 PROCEDURE — 97110 THERAPEUTIC EXERCISES: CPT | Mod: PN | Performed by: PHYSICAL THERAPIST

## 2019-04-29 PROCEDURE — 3008F BODY MASS INDEX DOCD: CPT | Mod: CPTII,S$GLB,, | Performed by: INTERNAL MEDICINE

## 2019-04-29 PROCEDURE — 3079F PR MOST RECENT DIASTOLIC BLOOD PRESSURE 80-89 MM HG: ICD-10-PCS | Mod: CPTII,S$GLB,, | Performed by: INTERNAL MEDICINE

## 2019-04-29 PROCEDURE — 3079F DIAST BP 80-89 MM HG: CPT | Mod: CPTII,S$GLB,, | Performed by: INTERNAL MEDICINE

## 2019-04-29 PROCEDURE — 3008F PR BODY MASS INDEX (BMI) DOCUMENTED: ICD-10-PCS | Mod: CPTII,S$GLB,, | Performed by: INTERNAL MEDICINE

## 2019-04-29 PROCEDURE — 3077F SYST BP >= 140 MM HG: CPT | Mod: CPTII,S$GLB,, | Performed by: INTERNAL MEDICINE

## 2019-04-29 PROCEDURE — 99999 PR PBB SHADOW E&M-EST. PATIENT-LVL III: ICD-10-PCS | Mod: PBBFAC,,, | Performed by: INTERNAL MEDICINE

## 2019-04-29 PROCEDURE — 99213 OFFICE O/P EST LOW 20 MIN: CPT | Mod: S$GLB,,, | Performed by: INTERNAL MEDICINE

## 2019-04-29 PROCEDURE — 99999 PR PBB SHADOW E&M-EST. PATIENT-LVL III: CPT | Mod: PBBFAC,,, | Performed by: INTERNAL MEDICINE

## 2019-04-29 RX ORDER — CYCLOBENZAPRINE HCL 5 MG
5 TABLET ORAL 3 TIMES DAILY PRN
Refills: 3 | Status: ON HOLD | COMMUNITY
Start: 2019-04-16 | End: 2019-05-24 | Stop reason: HOSPADM

## 2019-04-29 NOTE — LETTER
[facility] - Pulmonary  [address]  [place] [address]  Phone: [phone]  Fax: [phone]   2019      Marco A Cooley MD   Neurosurgery        Re: Jesi Mcignnis,  1960      Patient is in stable and optimal respiratory condition for needed neck surgery.  Pt is cleared from pulmonary perspective.       Thank You,      Matt Peralta MD  Pulmonology.

## 2019-04-29 NOTE — PROGRESS NOTES
2019    Jesi Mcginnis  Office Note    Chief Complaint   Patient presents with    surgical clearence     neck       HPI:    2019- for triple fusion and discectomy.  Will need 6 wks recovery with neck brace for 4 wks, scheduled may 23, 2019.  Teeth all repaired.  No pneumonia or diarrhea or c diff.  Uses tramadol 2 q 8 for neck.  No active lung problems    - having severe chronic neck pain.  Mri abn      18- No respiratory complaints, Neck pain with radiation down right arm consistent MRI scheduled in one week.   Relieved with tramadol prescribed by PCP.  Smoking hx: 30 pack years stopped 4 years ago.  Lower teeth extraction completed upper teeth scheduled for 2019.     Aug 20, 2018 no resp c/o.  Having increased neck pain and radicular pain down right arm.  Was on tramadol in past with withdrawal from 120/d when could not get.  Wishes to try lower dose and more cautious use.  Uses peridex.  Got c diff- off few days- still diarrhea.     July 10, 2018pt still cough and foul mucous but feels better.  On clindamycin.  Drenching night sweats better than pta.  Reports yeast.  Needs dental work.  Has limited resources - needs dental work, uses peridex.          The chief compliant  problem is new to me  PFSH:  History reviewed. No pertinent past medical history.      Past Surgical History:   Procedure Laterality Date    AUGMENTATION OF BREAST Bilateral     baker's cyst removal      BREAST SURGERY      HYSTERECTOMY      Injection-steroid-epidural-cervical N/A 2/15/2019    Performed by Chet Bello MD at [facility]    KNEE SURGERY Bilateral     laproscopic surgery    SHOULDER SURGERY Left     laproscopic     Social History     Tobacco Use    Smoking status: Former Smoker     Packs/day: 1.00     Years: 30.00     Pack years: 30.00     Types: Cigarettes     Last attempt to quit:      Years since quittin.3    Smokeless tobacco: Never Used    Tobacco comment: Patient now vapes  "  Substance Use Topics    Alcohol use: Yes     Alcohol/week: 4.2 oz     Types: 7 Glasses of wine per week     Comment: glass of wine daily    Drug use: No     Family History   Problem Relation Age of Onset    Rheum arthritis Mother     Heart disease Father     Parkinsonism Father      Review of patient's allergies indicates:  No Known Allergies  I have reviewed past medical, family, and social history. I have reviewed previous nurse notes.    Performance Status:The patient's activity level is no limits with regular activity.          Review of Systems   Constitutional: Negative for activity change, appetite change, chills, diaphoresis, fatigue, fever and unexpected weight change.   HENT: Negative for dental problem, sneezing, sore throat, trouble swallowing and voice change.  Positive for postnasal drip, rhinorrhea, sinus pressure, sinus pain,   Respiratory: Negative for apnea, cough, chest tightness, shortness of breath, wheezing and stridor.    Cardiovascular: Negative for chest pain, palpitations and leg swelling.   Gastrointestinal: Negative for abdominal distention, abdominal pain, constipation and nausea.   Musculoskeletal: Negative for gait problem. Positive for myalgias and neck pain.   Skin: Negative for color change and pallor.   Allergic/Immunologic: Negative for environmental allergies and food allergies.   Neurological: Negative for dizziness, speech difficulty, weakness, light-headedness, numbness and headaches.   Hematological: Negative for adenopathy. Does not bruise/bleed easily.   Psychiatric/Behavioral: Negative for dysphoric mood and sleep disturbance. The patient is not nervous/anxious.           Exam:Comprehensive exam done. BP (!) 163/88 (BP Location: Right arm, Patient Position: Sitting)   Pulse 72   Ht 5' 5" (1.651 m)   Wt 66.6 kg (146 lb 13.2 oz)   SpO2 99% Comment: on room air  BMI 24.43 kg/m²   Exam included Vitals as listed  Constitutional: He is oriented to person, place, and " time. He appears well-developed. No distress.   Nose: Nose normal.   Mouth/Throat: Uvula is midline, oropharynx is clear and moist and mucous membranes are normal. No dental caries. No oropharyngeal exudate, posterior oropharyngeal edema, posterior oropharyngeal erythema or tonsillar abscesses.  Mallapatti (M) score 2  Eyes: Pupils are equal, round, and reactive to light.   Neck: No JVD present. No thyromegaly present.   Cardiovascular: Normal rate, regular rhythm and normal heart sounds. Exam reveals no gallop and no friction rub.   No murmur heard.  Pulmonary/Chest: Effort normal and breath sounds normal. No accessory muscle usage or stridor. No apnea and no tachypnea. No respiratory distress, decreased breath sounds, wheezes, rhonchi, rales, or tenderness.   Abdominal: Soft. He exhibits no mass. There is no tenderness. No hepatosplenomegaly, hernias and normoactive bowel sounds  Musculoskeletal: Normal range of motion. exhibits no edema.   Lymphadenopathy:     He has no cervical adenopathy.     He has no axillary adenopathy.   Neurological:  alert and oriented to person, place, and time. not disoriented.   Skin: Skin is warm and dry.  No cyanosis or erythema. No pallor. Nails show no clubbing.   Psychiatric: normal mood and affect. behavior is normal. Judgment and thought content normal.       Radiographs (ct chest and cxr) reviewed: results reviewed cxr 3/23/19 slight scar, rul.        CT CHEST WITHOUT CONTRAST   Impression   Resolution of right lung consolidation, with residual parenchymal scarring or atelectasis accompanied by small areas of cavitation and/or bronchiectasis.  Electronically signed by: Paul Quintana MD  Date: 11/15/2018  Time: 09:26     XR CHEST PA AND LATERAL   FINDINGS:  The mediastinal and cardiac size and contours are normal.  There remains a band of atelectasis and scar in the right upper lobe posteriorly.  This is slightly decreased from the prior study.  No new mass or infiltrate is  seen.  No pneumothorax is noted.      Impression       Slowly resolving atelectasis and scar in the right upper lobe.      Electronically signed by: Hua Alvarez MD  Date: 08/17/2018  Time: 14:06       Labs reviewed       Lab Results   Component Value Date    WBC 4.24 03/29/2019    RBC 3.72 (L) 03/29/2019    HGB 12.9 03/29/2019    HCT 38.8 03/29/2019     (H) 03/29/2019    MCH 34.7 (H) 03/29/2019    MCHC 33.2 03/29/2019    RDW 12.4 03/29/2019     03/29/2019    MPV 11.4 03/29/2019    GRAN 2.4 03/29/2019    GRAN 57.1 03/29/2019    LYMPH 1.4 03/29/2019    LYMPH 32.5 03/29/2019    MONO 0.3 03/29/2019    MONO 8.0 03/29/2019    EOS 0.1 03/29/2019    BASO 0.03 03/29/2019    EOSINOPHIL 1.2 03/29/2019    BASOPHIL 0.7 03/29/2019       PFT not available          Plan:  Clinical impression is resonably certain and repeated evaluation prn +/- follow up will be needed as below.    Jesi was seen today for surgical clearence.    Diagnoses and all orders for this visit:    History of tobacco abuse    Abscess of right lung with pneumonia, unspecified part of lung        Follow up if symptoms worsen or fail to improve.    Discussed with patient above for education the following:      Patient Instructions   All seems well, stay off smokes, get neck surgery - caution if swallow problems.

## 2019-04-29 NOTE — PROGRESS NOTES
Name: Jesi Mcginnis   Clinic Number: [id number]   Age: 58 y.o.   Diagnosis:   Encounter Diagnosis   Name Primary?    Neck pain       Physician: Marina Wiggins FNP*   Original Orders : PT eval and treat  Initial visit: 1/17/2019  Date of Last visit: 4/29/2019  Date of Discharge Note:  4/29/2019  Total Visits Received: 12  Missed Visits: 0    Subjective: Patient to have surgery 5/23/2019.    Objective:  Treatment :    Included:Therapeutic exercise and Soft tissue mobilizations       CROM:                        Previous                     Current  Flexion                         60*                               66*  Extension                    42*                               50*  Left side bending        12*                               36*  Right side bending      22*                               36*  Left rotation                 26                                60*  Right rotation               40*                               60*     MMT:                           Left      Right                Left      Right  Shoulder flexion          4+/5       4+/5          4+/5     4+/5       Shoulder abduction     4+/5       4+/5         4+/5     4+/5  Shoulder ER                4+/5      4+/5          4+/5  4+/5  Scapular retraction      4+/5      4+/5          4+/5 4+/5  Elbow flexion               4+/5     4+/5                 4+/5    4+/5  Elbow extension          4+/5     4+/5                 4+/5     4+/5  Wrist flexion                4+/5     4+/5                 4+/5     4+/5  Wrist extension           4+/5     4+/5                 4+/5     4+/5          Treatment today:    Time In: 1507  Time Out: 1600       UBE 5 forward/backward  Anterior chest wall stretch 10 x 10 sec  Cable column extension 3 x 10  3#  B  Cable column adduction 3 x 10  3#  B  Cable column mid row 3 x 10 3#   Cable column ER 3 x 10 3#  Cable column lat pul down 3 x 10 3#  Scapular retraction 3 x 10 GTB  Upper trap stretch 10 x 10  sec  Chin tuck 3 x 10  CROM in all planes 3 x 10 each     IASTM to the bilateral sternocleidomastoid  for 8 minutes        Assessment:    Goals Achieved:   1. Patient will be independent with a written HEP  5. Increase CROM to WFL  6. Increase shoulder strength to 4+/5      Goals Not achieved and why: Patient to have surgery 5/23/2019  2. Decrease soft tissue tenderness to mild  3. Decrease pain at worst to 2/10  4. Decrease NDI impairment to <20%  Discharge reason : Patient has maximized benefit from PT at this time    Discharge plan :Continue HEP    Plan:  This patient is discharged from [facility] Therapy Services.

## 2019-05-01 ENCOUNTER — PATIENT MESSAGE (OUTPATIENT)
Dept: NEUROSURGERY | Facility: CLINIC | Age: 59
End: 2019-05-01

## 2019-05-23 PROBLEM — I10 HTN (HYPERTENSION): Status: ACTIVE | Noted: 2019-05-23

## 2019-06-03 ENCOUNTER — CLINICAL SUPPORT (OUTPATIENT)
Dept: NEUROSURGERY | Facility: CLINIC | Age: 59
End: 2019-06-03
Payer: COMMERCIAL

## 2019-06-03 RX ORDER — OXYCODONE AND ACETAMINOPHEN 7.5; 325 MG/1; MG/1
1 TABLET ORAL EVERY 6 HOURS PRN
Qty: 28 TABLET | Refills: 0 | Status: SHIPPED | OUTPATIENT
Start: 2019-06-03 | End: 2019-09-26

## 2019-06-03 NOTE — PROGRESS NOTES
Pt is 12 days s/p ACDF with Dr. Cooley. No s/s of infection. Incision cleaned with chloraprep and steri strips removed with no issue. Incision is warm, dry, intact. Pt reports post-operative pain level < pre-operative state. Pt is requesting medication refill today. Pt re-educated on narcotics policy. Educated patient on weight lifting status, bending/lifting/twisting, and to call with any changes or questions. Pt aware of imaging and f/u appt with provider. No further questions.

## 2019-06-04 ENCOUNTER — PATIENT MESSAGE (OUTPATIENT)
Dept: NEUROSURGERY | Facility: CLINIC | Age: 59
End: 2019-06-04

## 2019-06-10 DIAGNOSIS — Z98.1 S/P CERVICAL SPINAL FUSION: Primary | ICD-10-CM

## 2019-06-17 ENCOUNTER — OFFICE VISIT (OUTPATIENT)
Dept: NEUROSURGERY | Facility: CLINIC | Age: 59
End: 2019-06-17
Payer: COMMERCIAL

## 2019-06-17 ENCOUNTER — HOSPITAL ENCOUNTER (OUTPATIENT)
Dept: RADIOLOGY | Facility: HOSPITAL | Age: 59
Discharge: HOME | End: 2019-06-17
Attending: NEUROLOGICAL SURGERY
Payer: COMMERCIAL

## 2019-06-17 VITALS — BODY MASS INDEX: 25.18 KG/M2 | TEMPERATURE: 98 F | HEIGHT: 65 IN | WEIGHT: 151.13 LBS | HEART RATE: 87 BPM

## 2019-06-17 DIAGNOSIS — Z98.1 S/P CERVICAL SPINAL FUSION: Primary | ICD-10-CM

## 2019-06-17 DIAGNOSIS — M54.12 CERVICAL RADICULOPATHY: ICD-10-CM

## 2019-06-17 DIAGNOSIS — Z98.1 S/P CERVICAL SPINAL FUSION: ICD-10-CM

## 2019-06-17 PROCEDURE — 99024 POSTOP FOLLOW-UP VISIT: CPT | Mod: S$GLB,,, | Performed by: PHYSICIAN ASSISTANT

## 2019-06-17 PROCEDURE — 72040 XR CERVICAL SPINE AP LATERAL: ICD-10-PCS | Mod: 26,,, | Performed by: RADIOLOGY

## 2019-06-17 PROCEDURE — 72040 X-RAY EXAM NECK SPINE 2-3 VW: CPT | Mod: 26,,, | Performed by: RADIOLOGY

## 2019-06-17 PROCEDURE — 72040 X-RAY EXAM NECK SPINE 2-3 VW: CPT | Mod: TC,FY

## 2019-06-17 PROCEDURE — 99999 PR PBB SHADOW E&M-EST. PATIENT-LVL III: CPT | Mod: PBBFAC,,, | Performed by: PHYSICIAN ASSISTANT

## 2019-06-17 PROCEDURE — 99024 PR POST-OP FOLLOW-UP VISIT: ICD-10-PCS | Mod: S$GLB,,, | Performed by: PHYSICIAN ASSISTANT

## 2019-06-17 PROCEDURE — 99999 PR PBB SHADOW E&M-EST. PATIENT-LVL III: ICD-10-PCS | Mod: PBBFAC,,, | Performed by: PHYSICIAN ASSISTANT

## 2019-06-17 NOTE — PROGRESS NOTES
Neurosurgery History & Physical    Patient ID: Jesi Mcginnis is a 58 y.o. female.    Chief Complaint   Patient presents with    Follow-up     4 weeks post op ACDF. Mild neck pain controlled by pain medication.       Review of Systems   Constitutional: Negative for chills, diaphoresis, fatigue and fever.   HENT: Negative for congestion, ear pain, rhinorrhea, sneezing, sore throat and tinnitus.    Eyes: Negative for photophobia, pain, redness and visual disturbance.   Respiratory: Negative for cough, chest tightness, shortness of breath and wheezing.    Cardiovascular: Negative for chest pain, palpitations and leg swelling.   Gastrointestinal: Negative for abdominal distention, abdominal pain, constipation, diarrhea, nausea and vomiting.   Genitourinary: Negative for difficulty urinating, dysuria, frequency and urgency.   Musculoskeletal: Positive for neck pain. Negative for back pain, gait problem and myalgias.   Skin: Negative for pallor and rash.   Neurological: Positive for numbness. Negative for dizziness, seizures, speech difficulty, weakness and headaches.   Psychiatric/Behavioral: Negative for confusion and hallucinations.       Past Medical History:   Diagnosis Date    Abscess of right lung with pneumonia     Cervical disc disorder at C4-C5 level with radiculopathy 05/2019    Degeneration of cervical disc without myelopathy 05/2019     Social History     Socioeconomic History    Marital status:      Spouse name: Not on file    Number of children: Not on file    Years of education: Not on file    Highest education level: Not on file   Occupational History    Not on file   Social Needs    Financial resource strain: Not on file    Food insecurity:     Worry: Not on file     Inability: Not on file    Transportation needs:     Medical: Not on file     Non-medical: Not on file   Tobacco Use    Smoking status: Former Smoker     Packs/day: 1.00     Years: 30.00     Pack years: 30.00      Types: Cigarettes     Last attempt to quit: 2013     Years since quittin.4    Smokeless tobacco: Never Used    Tobacco comment: Patient now vapes, but is weaining off   Substance and Sexual Activity    Alcohol use: Yes     Alcohol/week: 4.2 oz     Types: 7 Glasses of wine per week     Comment: glass of wine daily    Drug use: No    Sexual activity: Yes     Birth control/protection: See Surgical Hx   Lifestyle    Physical activity:     Days per week: Not on file     Minutes per session: Not on file    Stress: Not on file   Relationships    Social connections:     Talks on phone: Not on file     Gets together: Not on file     Attends [employer] service: Not on file     Active member of club or organization: Not on file     Attends meetings of clubs or organizations: Not on file     Relationship status: Not on file   Other Topics Concern    Not on file   Social History Narrative    Not on file     Family History   Problem Relation Age of Onset    Rheum arthritis Mother     Arthritis Mother     Obesity Mother     Heart disease Father     Parkinsonism Father     Obesity Father      Review of patient's allergies indicates:  No Known Allergies    Current Outpatient Medications:     ciprofloxacin HCl (CIPRO) 250 MG tablet, After intercourse, Disp: 15 tablet, Rfl: 2    fluticasone (FLONASE) 50 mcg/actuation nasal spray, 1 spray (50 mcg total) by Each Nare route once daily. (Patient taking differently: 1 spray by Each Nare route daily as needed. ), Disp: 16 g, Rfl: 6    gabapentin (NEURONTIN) 600 MG tablet, Take 600 mg by mouth 3 (three) times daily. TAKE AM OF SURGERY WITH A SIP OF WATER, Disp: , Rfl: 2    multivitamin with minerals (HI POTENCY VITAMINS-MINERALS ORAL), Take 1 packet by mouth 2 (two) times daily. SEROVITAL VITAMIN /MINERAL PACKETS, Disp: , Rfl:     olmesartan-hydrochlorothiazide (BENICAR HCT) 40-25 mg per tablet, Take 1 tablet by mouth once daily., Disp: , Rfl:      "oxyCODONE-acetaminophen (PERCOCET) 7.5-325 mg per tablet, Take 1 tablet by mouth every 6 (six) hours as needed for Pain., Disp: 28 tablet, Rfl: 0    traZODone (DESYREL) 50 MG tablet, Take 1 tablet (50 mg total) by mouth every evening. (Patient taking differently: Take 50 mg by mouth nightly as needed. ), Disp: 90 tablet, Rfl: 3    diazePAM (VALIUM) 5 MG tablet, Take 1 tablet (5 mg total) by mouth every 8 (eight) hours as needed (muscle spasms)., Disp: 30 tablet, Rfl: 0    phenazopyridine (PYRIDIUM) 95 MG tablet, Take 95 mg by mouth 3 (three) times daily as needed for Pain., Disp: , Rfl:   Pulse 87, temperature 98.2 °F (36.8 °C), height 5' 5" (1.651 m), weight 68.6 kg (151 lb 2 oz).      Neurologic Exam  Mental Status   Oriented to person, place, and time.   Attention: normal. Concentration: normal.   Speech: speech is normal   Level of consciousness: alert  Knowledge: good.      Cranial Nerves      CN II   Visual acuity: normal     CN III, IV, VI   Pupils are equal, round, and reactive to light.  Extraocular motions are normal.      CN V   Facial sensation intact.      CN VII   Facial expression full, symmetric.      CN VIII   Hearing: intact     CN IX, X   Palate: symmetric     CN XI   CN XI normal.      CN XII   CN XII normal.      Motor Exam   Muscle bulk: normal  Overall muscle tone: normal  Right arm pronator drift: absent  Left arm pronator drift: absent     Strength   Right deltoid: 5/5  Left deltoid: 5/5  Right biceps: 5/5  Left biceps: 5/5  Right triceps: 5/5  Left triceps: 5/5  Right wrist flexion: 5/5  Left wrist flexion: 5/5  Right wrist extension: 5/5  Left wrist extension: 5/5  Right interossei: 5/5  Left interossei: 5/5  Right iliopsoas: 5/5  Left iliopsoas: 5/5  Right quadriceps: 5/5  Left quadriceps: 5/5  Right hamstrin/5  Left hamstrin/5  Right anterior tibial: 5/5  Left anterior tibial: 5/5  Right posterior tibial: 5/5  Left posterior tibial: 5/5  Right peroneal: 5/5  Left " peroneal: 5/5  Right gastroc: 5/5  Left gastroc: 5/5     Sensory Exam   Light touch normal.      Gait, Coordination, and Reflexes      Gait  Gait: normal     Coordination   Romberg: negative  Finger to nose coordination: normal     Tremor   Resting tremor: absent     Reflexes   Right brachioradialis: 3+  Left brachioradialis: 3+  Right biceps: 3+  Left biceps: 3+  Right triceps: 3+  Left triceps: 3+  Right patellar: 3+  Left patellar: 3+  Right achilles: 0  Left achilles: 0  Right plantar: normal  Left plantar: normal  Right Holt: absent  Left Holt: absent  Right ankle clonus: absent  Left ankle clonus: absent     Physical Exam  Constitutional: She is oriented to person, place, and time.   Eyes: EOM are normal. Pupils are equal, round, and reactive to light.   Neurological: She is oriented to person, place, and time. She has a normal Finger-Nose-Finger Test and a normal Romberg Test. Gait normal.   Reflex Scores:       Tricep reflexes are 3+ on the right side and 3+ on the left side.       Bicep reflexes are 3+ on the right side and 3+ on the left side.       Brachioradialis reflexes are 3+ on the right side and 3+ on the left side.       Patellar reflexes are 3+ on the right side and 3+ on the left side.       Achilles reflexes are 0 on the right side and 0 on the left side.  Psychiatric: Her speech is normal.     Provider dictation:    Ms. Jesi Mcginnis is a 58 year old female who presents for 4 week post-op appointment s/p C4-C7 ACDF with Dr. Cooley. She has done well with surgery with resolution of arm pain and improvement in neck pain. She also reports significant improvement in bilateral hand numbness. She continues to wear her cervical collar daily. She is very happy with her surgical outcome.     On exam patient has full strength and no sensory deficits. Incision is well healed.    X-ray cervical spine shows stable appropriate placement of instrumentation. No complication seen.     Ms. Mcginnis  is doing well with improvement in neck pain and cervical radiculopathy. She is clear to remove the cervical collar at 6 weeks post-op. She will follow-up as scheduled for 3 month post-op appointment with repeat cervical x-ray. Patient was informed to contact the clinic with any further questions or concerns.     1. S/P cervical spinal fusion  X-Ray Cervical Spine AP And Lateral   2. Cervical radiculopathy

## 2019-07-02 ENCOUNTER — PATIENT MESSAGE (OUTPATIENT)
Dept: NEUROSURGERY | Facility: CLINIC | Age: 59
End: 2019-07-02

## 2019-07-03 ENCOUNTER — PATIENT MESSAGE (OUTPATIENT)
Dept: NEUROSURGERY | Facility: CLINIC | Age: 59
End: 2019-07-03

## 2019-07-05 ENCOUNTER — PATIENT MESSAGE (OUTPATIENT)
Dept: NEUROSURGERY | Facility: CLINIC | Age: 59
End: 2019-07-05

## 2019-09-26 ENCOUNTER — DOCUMENTATION ONLY (OUTPATIENT)
Dept: FAMILY MEDICINE | Facility: CLINIC | Age: 59
End: 2019-09-26

## 2019-09-26 ENCOUNTER — OFFICE VISIT (OUTPATIENT)
Dept: FAMILY MEDICINE | Facility: CLINIC | Age: 59
End: 2019-09-26
Payer: COMMERCIAL

## 2019-09-26 VITALS
WEIGHT: 153.44 LBS | TEMPERATURE: 98 F | SYSTOLIC BLOOD PRESSURE: 156 MMHG | BODY MASS INDEX: 25.56 KG/M2 | DIASTOLIC BLOOD PRESSURE: 84 MMHG | HEART RATE: 94 BPM | OXYGEN SATURATION: 95 % | HEIGHT: 65 IN

## 2019-09-26 DIAGNOSIS — N39.0 RECURRENT UTI: ICD-10-CM

## 2019-09-26 DIAGNOSIS — I10 ESSENTIAL HYPERTENSION: Primary | ICD-10-CM

## 2019-09-26 PROBLEM — J85.1 ABSCESS OF RIGHT LUNG WITH PNEUMONIA: Status: RESOLVED | Noted: 2018-06-29 | Resolved: 2019-09-26

## 2019-09-26 PROBLEM — B37.9 YEAST INFECTION: Status: RESOLVED | Noted: 2018-07-10 | Resolved: 2019-09-26

## 2019-09-26 PROBLEM — J85.0 NECROTIZING PNEUMONIA: Status: RESOLVED | Noted: 2018-06-29 | Resolved: 2019-09-26

## 2019-09-26 PROBLEM — R04.2 HEMOPTYSIS: Status: RESOLVED | Noted: 2018-06-29 | Resolved: 2019-09-26

## 2019-09-26 PROBLEM — E87.6 HYPOKALEMIA: Status: RESOLVED | Noted: 2018-06-30 | Resolved: 2019-09-26

## 2019-09-26 PROBLEM — K02.9 CARIES: Status: RESOLVED | Noted: 2018-06-29 | Resolved: 2019-09-26

## 2019-09-26 PROBLEM — E44.0 MODERATE MALNUTRITION: Status: RESOLVED | Noted: 2018-07-05 | Resolved: 2019-09-26

## 2019-09-26 PROCEDURE — 90715 TDAP VACCINE GREATER THAN OR EQUAL TO 7YO IM: ICD-10-PCS | Mod: S$GLB,,, | Performed by: PHYSICIAN ASSISTANT

## 2019-09-26 PROCEDURE — 90472 TDAP VACCINE GREATER THAN OR EQUAL TO 7YO IM: ICD-10-PCS | Mod: S$GLB,,, | Performed by: PHYSICIAN ASSISTANT

## 2019-09-26 PROCEDURE — 3008F BODY MASS INDEX DOCD: CPT | Mod: CPTII,S$GLB,, | Performed by: PHYSICIAN ASSISTANT

## 2019-09-26 PROCEDURE — 3079F DIAST BP 80-89 MM HG: CPT | Mod: CPTII,S$GLB,, | Performed by: PHYSICIAN ASSISTANT

## 2019-09-26 PROCEDURE — 90686 IIV4 VACC NO PRSV 0.5 ML IM: CPT | Mod: S$GLB,,, | Performed by: PHYSICIAN ASSISTANT

## 2019-09-26 PROCEDURE — 3077F PR MOST RECENT SYSTOLIC BLOOD PRESSURE >= 140 MM HG: ICD-10-PCS | Mod: CPTII,S$GLB,, | Performed by: PHYSICIAN ASSISTANT

## 2019-09-26 PROCEDURE — 3008F PR BODY MASS INDEX (BMI) DOCUMENTED: ICD-10-PCS | Mod: CPTII,S$GLB,, | Performed by: PHYSICIAN ASSISTANT

## 2019-09-26 PROCEDURE — 90686 FLU VACCINE (QUAD) GREATER THAN OR EQUAL TO 3YO PRESERVATIVE FREE IM: ICD-10-PCS | Mod: S$GLB,,, | Performed by: PHYSICIAN ASSISTANT

## 2019-09-26 PROCEDURE — 90471 FLU VACCINE (QUAD) GREATER THAN OR EQUAL TO 3YO PRESERVATIVE FREE IM: ICD-10-PCS | Mod: S$GLB,,, | Performed by: PHYSICIAN ASSISTANT

## 2019-09-26 PROCEDURE — 99214 PR OFFICE/OUTPT VISIT, EST, LEVL IV, 30-39 MIN: ICD-10-PCS | Mod: 25,S$GLB,, | Performed by: PHYSICIAN ASSISTANT

## 2019-09-26 PROCEDURE — 99999 PR PBB SHADOW E&M-EST. PATIENT-LVL IV: ICD-10-PCS | Mod: PBBFAC,,, | Performed by: PHYSICIAN ASSISTANT

## 2019-09-26 PROCEDURE — 90715 TDAP VACCINE 7 YRS/> IM: CPT | Mod: S$GLB,,, | Performed by: PHYSICIAN ASSISTANT

## 2019-09-26 PROCEDURE — 3077F SYST BP >= 140 MM HG: CPT | Mod: CPTII,S$GLB,, | Performed by: PHYSICIAN ASSISTANT

## 2019-09-26 PROCEDURE — 90471 IMMUNIZATION ADMIN: CPT | Mod: S$GLB,,, | Performed by: PHYSICIAN ASSISTANT

## 2019-09-26 PROCEDURE — 99214 OFFICE O/P EST MOD 30 MIN: CPT | Mod: 25,S$GLB,, | Performed by: PHYSICIAN ASSISTANT

## 2019-09-26 PROCEDURE — 90472 IMMUNIZATION ADMIN EACH ADD: CPT | Mod: S$GLB,,, | Performed by: PHYSICIAN ASSISTANT

## 2019-09-26 PROCEDURE — 99999 PR PBB SHADOW E&M-EST. PATIENT-LVL IV: CPT | Mod: PBBFAC,,, | Performed by: PHYSICIAN ASSISTANT

## 2019-09-26 PROCEDURE — 3079F PR MOST RECENT DIASTOLIC BLOOD PRESSURE 80-89 MM HG: ICD-10-PCS | Mod: CPTII,S$GLB,, | Performed by: PHYSICIAN ASSISTANT

## 2019-09-26 RX ORDER — OLMESARTAN MEDOXOMIL AND HYDROCHLOROTHIAZIDE 40/12.5 40; 12.5 MG/1; MG/1
1 TABLET ORAL DAILY
Qty: 90 TABLET | Refills: 3 | Status: SHIPPED | OUTPATIENT
Start: 2019-09-26 | End: 2020-02-19

## 2019-09-26 RX ORDER — HYDROCODONE BITARTRATE AND ACETAMINOPHEN 5; 325 MG/1; MG/1
1 TABLET ORAL EVERY 8 HOURS PRN
Refills: 0 | COMMUNITY
Start: 2019-09-10 | End: 2020-02-17

## 2019-09-26 RX ORDER — CYCLOBENZAPRINE HCL 5 MG
5 TABLET ORAL 3 TIMES DAILY PRN
Refills: 3 | COMMUNITY
Start: 2019-08-06 | End: 2020-07-02

## 2019-09-26 RX ORDER — TRAMADOL HYDROCHLORIDE 50 MG/1
TABLET ORAL
Refills: 0 | COMMUNITY
Start: 2019-06-25 | End: 2020-02-17

## 2019-09-26 RX ORDER — CIPROFLOXACIN 250 MG/1
250 TABLET, FILM COATED ORAL DAILY PRN
Qty: 15 TABLET | Refills: 2 | Status: SHIPPED | OUTPATIENT
Start: 2019-09-26 | End: 2022-07-28

## 2019-09-26 RX ORDER — TRAZODONE HYDROCHLORIDE 100 MG/1
TABLET ORAL
Refills: 3 | COMMUNITY
Start: 2019-09-10 | End: 2022-10-28 | Stop reason: ALTCHOICE

## 2019-09-26 NOTE — PROGRESS NOTES
Pre-Visit Chart Review  For Appointment Scheduled on (9/26/19)    Health Maintenance Due   Topic Date Due    TETANUS VACCINE  07/11/1980

## 2019-09-26 NOTE — PROGRESS NOTES
Subjective:       Patient ID: Jesi Mcginnis is a 59 y.o. female.    Chief Complaint: Follow-up (6 months) and other (flu vaccine, pneumonia vaccine)    Patient is new to me.    Patient has hypertension.  She is currently on Benicar 40/25 mg.  Patient states that she is inconsistently taking the medication because it causes low blood pressure readings when she takes it.  She states that she develops low blood pressure 12:24 p.m. after taking the medication.  Her blood pressure has been as low as 81/57.  When her blood pressure is low she skips the medication for a day and then she takes it the following day.  When the patient's blood pressure elevates it goes up to 150s over 90s.  Patient is feeling well and has no complaints at this time  Patients patient medical/surgical, social and family histories have been reviewed       Review of Systems   Constitutional: Negative for activity change and unexpected weight change.   HENT: Negative for hearing loss, rhinorrhea and trouble swallowing.    Eyes: Negative for discharge and visual disturbance.   Respiratory: Negative for chest tightness and wheezing.    Cardiovascular: Negative for chest pain and palpitations.   Gastrointestinal: Negative for blood in stool, constipation, diarrhea and vomiting.   Endocrine: Negative for polydipsia and polyuria.   Genitourinary: Negative for difficulty urinating, dysuria, hematuria and menstrual problem.   Musculoskeletal: Negative for arthralgias, joint swelling and neck pain.   Neurological: Negative for weakness and headaches.   Psychiatric/Behavioral: Negative for confusion and dysphoric mood.       Objective:      Physical Exam   Constitutional: She appears well-developed and well-nourished. She is cooperative. No distress.   HENT:   Head: Normocephalic and atraumatic.   Cardiovascular: Normal rate, regular rhythm and normal heart sounds.   Pulmonary/Chest: Effort normal and breath sounds normal.   Musculoskeletal:         Right lower leg: She exhibits no edema.        Left lower leg: She exhibits no edema.   Neurological: She is alert.   Skin: Skin is warm and dry.       Assessment:       1. Essential hypertension    2. Recurrent UTI        Plan:       Jesi was seen today for follow-up and other.    Diagnoses and all orders for this visit:    Essential hypertension  -     Hemoglobin A1c; Future  -     Lipid panel; Future  -     CBC auto differential; Future  -     Comprehensive metabolic panel; Future  -     Urinalysis; Future  -     TSH; Future  -     T4, free; Future    Recurrent UTI  -     Refill ciprofloxacin HCl (CIPRO) 250 MG tablet; Take 1 tablet (250 mg total) by mouth daily as needed.  Take with intercourse   Other orders  -   Reducing  olmesartan-hydrochlorothiazide (BENICAR HCT) 40-12.5 mg Tab; Take 1 tablet by mouth once daily.     -     (In Office Administered) Tdap Vaccine     Of note patient's blood pressure is in fact elevated at today's visit.  She did not take her blood pressure medication yesterday because her blood pressure was low in the 80s over 50s.  I am adjusting her blood pressure medication to a lower dose so that she will be able to take it daily an effort to avoid wide fluctuations.      Follow up for nurse BP in-4 weeks bring log ,  fasting blood work, 4 weeks, follow up pcp 3 mo .   DISCLAIMER: This note was prepared with [employer] voice recognition transcription software. Garbled syntax, mangled pronouns, and other bizarre constructions may be attributed to that software system   Patient readiness: acceptance and eager and barriers:none    During the course of the visit the patient was educated and counseled about the following:     Hypertension:   see above     Goals: Hypertension: Reduce Blood Pressure    Did patient meet goals/outcomes: No    The following self management tools provided: blood pressure log    Patient Instructions (the written plan) was given to the patient/family.     Time spent  with patient: 30 minutes    Barriers to medications present (no )    Adverse reactions to current medications (yes)    Over the counter medications reviewed (Yes)

## 2019-10-03 DIAGNOSIS — J01.10 ACUTE FRONTAL SINUSITIS, RECURRENCE NOT SPECIFIED: ICD-10-CM

## 2019-10-03 RX ORDER — FLUTICASONE PROPIONATE 50 MCG
1 SPRAY, SUSPENSION (ML) NASAL DAILY
Qty: 16 G | Refills: 6 | Status: SHIPPED | OUTPATIENT
Start: 2019-10-03

## 2019-10-24 ENCOUNTER — CLINICAL SUPPORT (OUTPATIENT)
Dept: FAMILY MEDICINE | Facility: CLINIC | Age: 59
End: 2019-10-24
Payer: COMMERCIAL

## 2019-10-24 ENCOUNTER — LAB VISIT (OUTPATIENT)
Dept: LAB | Facility: HOSPITAL | Age: 59
End: 2019-10-24
Payer: COMMERCIAL

## 2019-10-24 VITALS — DIASTOLIC BLOOD PRESSURE: 72 MMHG | SYSTOLIC BLOOD PRESSURE: 102 MMHG

## 2019-10-24 DIAGNOSIS — I10 ESSENTIAL HYPERTENSION: ICD-10-CM

## 2019-10-24 DIAGNOSIS — Z01.30 BLOOD PRESSURE CHECK: Primary | ICD-10-CM

## 2019-10-24 LAB
ALBUMIN SERPL BCP-MCNC: 4.2 G/DL (ref 3.5–5.2)
ALP SERPL-CCNC: 49 U/L (ref 55–135)
ALT SERPL W/O P-5'-P-CCNC: 24 U/L (ref 10–44)
ANION GAP SERPL CALC-SCNC: 7 MMOL/L (ref 8–16)
AST SERPL-CCNC: 26 U/L (ref 10–40)
BASOPHILS # BLD AUTO: 0.04 K/UL (ref 0–0.2)
BASOPHILS NFR BLD: 1 % (ref 0–1.9)
BILIRUB SERPL-MCNC: 0.4 MG/DL (ref 0.1–1)
BUN SERPL-MCNC: 19 MG/DL (ref 6–20)
CALCIUM SERPL-MCNC: 9.8 MG/DL (ref 8.7–10.5)
CHLORIDE SERPL-SCNC: 100 MMOL/L (ref 95–110)
CHOLEST SERPL-MCNC: 256 MG/DL (ref 120–199)
CHOLEST/HDLC SERPL: 3.5 {RATIO} (ref 2–5)
CO2 SERPL-SCNC: 29 MMOL/L (ref 23–29)
CREAT SERPL-MCNC: 1.1 MG/DL (ref 0.5–1.4)
DIFFERENTIAL METHOD: ABNORMAL
EOSINOPHIL # BLD AUTO: 0.1 K/UL (ref 0–0.5)
EOSINOPHIL NFR BLD: 1.3 % (ref 0–8)
ERYTHROCYTE [DISTWIDTH] IN BLOOD BY AUTOMATED COUNT: 12.7 % (ref 11.5–14.5)
EST. GFR  (AFRICAN AMERICAN): >60 ML/MIN/1.73 M^2
EST. GFR  (NON AFRICAN AMERICAN): 55.9 ML/MIN/1.73 M^2
ESTIMATED AVG GLUCOSE: 105 MG/DL (ref 68–131)
GLUCOSE SERPL-MCNC: 92 MG/DL (ref 70–110)
HBA1C MFR BLD HPLC: 5.3 % (ref 4–5.6)
HCT VFR BLD AUTO: 37.2 % (ref 37–48.5)
HDLC SERPL-MCNC: 74 MG/DL (ref 40–75)
HDLC SERPL: 28.9 % (ref 20–50)
HGB BLD-MCNC: 12.1 G/DL (ref 12–16)
IMM GRANULOCYTES # BLD AUTO: 0.01 K/UL (ref 0–0.04)
IMM GRANULOCYTES NFR BLD AUTO: 0.3 % (ref 0–0.5)
LDLC SERPL CALC-MCNC: 157.4 MG/DL (ref 63–159)
LYMPHOCYTES # BLD AUTO: 1.6 K/UL (ref 1–4.8)
LYMPHOCYTES NFR BLD: 41.8 % (ref 18–48)
MCH RBC QN AUTO: 32.8 PG (ref 27–31)
MCHC RBC AUTO-ENTMCNC: 32.5 G/DL (ref 32–36)
MCV RBC AUTO: 101 FL (ref 82–98)
MONOCYTES # BLD AUTO: 0.4 K/UL (ref 0.3–1)
MONOCYTES NFR BLD: 11.2 % (ref 4–15)
NEUTROPHILS # BLD AUTO: 1.7 K/UL (ref 1.8–7.7)
NEUTROPHILS NFR BLD: 44.4 % (ref 38–73)
NONHDLC SERPL-MCNC: 182 MG/DL
NRBC BLD-RTO: 0 /100 WBC
PLATELET # BLD AUTO: 193 K/UL (ref 150–350)
PMV BLD AUTO: 11.2 FL (ref 9.2–12.9)
POTASSIUM SERPL-SCNC: 5.3 MMOL/L (ref 3.5–5.1)
PROT SERPL-MCNC: 7.5 G/DL (ref 6–8.4)
RBC # BLD AUTO: 3.69 M/UL (ref 4–5.4)
SODIUM SERPL-SCNC: 136 MMOL/L (ref 136–145)
T4 FREE SERPL-MCNC: 0.95 NG/DL (ref 0.71–1.51)
TRIGL SERPL-MCNC: 123 MG/DL (ref 30–150)
TSH SERPL DL<=0.005 MIU/L-ACNC: 1.76 UIU/ML (ref 0.4–4)
WBC # BLD AUTO: 3.85 K/UL (ref 3.9–12.7)

## 2019-10-24 PROCEDURE — 84443 ASSAY THYROID STIM HORMONE: CPT

## 2019-10-24 PROCEDURE — 85025 COMPLETE CBC W/AUTO DIFF WBC: CPT

## 2019-10-24 PROCEDURE — 80061 LIPID PANEL: CPT

## 2019-10-24 PROCEDURE — 36415 COLL VENOUS BLD VENIPUNCTURE: CPT | Mod: PO

## 2019-10-24 PROCEDURE — 80053 COMPREHEN METABOLIC PANEL: CPT

## 2019-10-24 PROCEDURE — 83036 HEMOGLOBIN GLYCOSYLATED A1C: CPT

## 2019-10-24 PROCEDURE — 99999 PR PBB SHADOW E&M-EST. PATIENT-LVL II: CPT | Mod: PBBFAC,,,

## 2019-10-24 PROCEDURE — 84439 ASSAY OF FREE THYROXINE: CPT

## 2019-10-24 PROCEDURE — 99999 PR PBB SHADOW E&M-EST. PATIENT-LVL II: ICD-10-PCS | Mod: PBBFAC,,,

## 2019-10-24 NOTE — PROGRESS NOTES
Pt presented to clinic for Nurse Blood Pressure check today. Patient's blood pressure was 102/72 taken manually on right arm. Advised patient to continue present treatment and follow up as scheduled.

## 2019-10-30 ENCOUNTER — TELEPHONE (OUTPATIENT)
Dept: FAMILY MEDICINE | Facility: CLINIC | Age: 59
End: 2019-10-30

## 2019-10-30 NOTE — TELEPHONE ENCOUNTER
----- Message from Lola Lynn sent at 10/30/2019  2:52 PM CDT -----  Contact: Patient  Type:  Patient Returning Call    Who Called:  Patient  Who Left Message for Patient:  Nellie  Does the patient know what this is regarding?:  Picking up a form  Best Call Back Number:    Additional Information: Patient wants to find out where she can  the form that she was told that she needs. Call to pod, no answer.

## 2019-11-19 ENCOUNTER — CLINICAL SUPPORT (OUTPATIENT)
Dept: OTHER | Facility: CLINIC | Age: 59
End: 2019-11-19
Payer: COMMERCIAL

## 2019-11-19 DIAGNOSIS — Z00.8 ENCOUNTER FOR OTHER GENERAL EXAMINATION: ICD-10-CM

## 2019-11-19 PROCEDURE — 80061 PR  LIPID PANEL: ICD-10-PCS | Mod: QW,S$GLB,, | Performed by: INTERNAL MEDICINE

## 2019-11-19 PROCEDURE — 99401 PREV MED CNSL INDIV APPRX 15: CPT | Mod: S$GLB,,, | Performed by: INTERNAL MEDICINE

## 2019-11-19 PROCEDURE — 80061 LIPID PANEL: CPT | Mod: QW,S$GLB,, | Performed by: INTERNAL MEDICINE

## 2019-11-19 PROCEDURE — 82947 PR  ASSAY QUANTITATIVE,BLOOD GLUCOSE: ICD-10-PCS | Mod: QW,S$GLB,, | Performed by: INTERNAL MEDICINE

## 2019-11-19 PROCEDURE — 99401 PR PREVENT COUNSEL,INDIV,15 MIN: ICD-10-PCS | Mod: S$GLB,,, | Performed by: INTERNAL MEDICINE

## 2019-11-19 PROCEDURE — 82947 ASSAY GLUCOSE BLOOD QUANT: CPT | Mod: QW,S$GLB,, | Performed by: INTERNAL MEDICINE

## 2019-11-20 VITALS — BODY MASS INDEX: 24.77 KG/M2 | HEIGHT: 66 IN

## 2019-11-20 LAB
GLUCOSE SERPL-MCNC: 90 MG/DL (ref 60–140)
HDLC SERPL-MCNC: 101 MG/DL
POC CHOLESTEROL, TOTAL: 273 MG/DL
TRIGL SERPL-MCNC: 297 MG/DL

## 2019-12-20 ENCOUNTER — DOCUMENTATION ONLY (OUTPATIENT)
Dept: FAMILY MEDICINE | Facility: CLINIC | Age: 59
End: 2019-12-20

## 2019-12-20 NOTE — PROGRESS NOTES
Pre-Visit Chart Review  For Appointment Scheduled on 12/27/19    Health Maintenance Due   Topic Date Due    LDCT Lung Screen  11/15/2019

## 2020-02-14 ENCOUNTER — PATIENT MESSAGE (OUTPATIENT)
Dept: FAMILY MEDICINE | Facility: CLINIC | Age: 60
End: 2020-02-14

## 2020-02-17 ENCOUNTER — DOCUMENTATION ONLY (OUTPATIENT)
Dept: FAMILY MEDICINE | Facility: CLINIC | Age: 60
End: 2020-02-17

## 2020-02-17 ENCOUNTER — OFFICE VISIT (OUTPATIENT)
Dept: FAMILY MEDICINE | Facility: CLINIC | Age: 60
End: 2020-02-17
Payer: COMMERCIAL

## 2020-02-17 VITALS
BODY MASS INDEX: 23.77 KG/M2 | HEART RATE: 80 BPM | SYSTOLIC BLOOD PRESSURE: 158 MMHG | TEMPERATURE: 98 F | DIASTOLIC BLOOD PRESSURE: 94 MMHG | OXYGEN SATURATION: 98 % | WEIGHT: 147.94 LBS | HEIGHT: 66 IN

## 2020-02-17 DIAGNOSIS — R19.7 DIARRHEA, UNSPECIFIED TYPE: ICD-10-CM

## 2020-02-17 DIAGNOSIS — R30.0 DYSURIA: ICD-10-CM

## 2020-02-17 DIAGNOSIS — N39.0 RECURRENT UTI: Primary | ICD-10-CM

## 2020-02-17 DIAGNOSIS — Z86.19 HISTORY OF CLOSTRIDIOIDES DIFFICILE COLITIS: ICD-10-CM

## 2020-02-17 LAB
BILIRUB SERPL-MCNC: NEGATIVE MG/DL
BILIRUB UR QL STRIP: NEGATIVE
BLOOD URINE, POC: NEGATIVE
CLARITY UR REFRACT.AUTO: CLEAR
COLOR UR AUTO: YELLOW
COLOR, POC UA: ABNORMAL
GLUCOSE UR QL STRIP: NEGATIVE
GLUCOSE UR QL STRIP: NORMAL
HGB UR QL STRIP: NEGATIVE
KETONES UR QL STRIP: ABNORMAL
KETONES UR QL STRIP: NEGATIVE
LEUKOCYTE ESTERASE UR QL STRIP: NEGATIVE
LEUKOCYTE ESTERASE URINE, POC: ABNORMAL
NITRITE UR QL STRIP: NEGATIVE
NITRITE, POC UA: ABNORMAL
PH SMN: 8 [PH]
PH UR STRIP: 7 [PH] (ref 5–8)
PROT UR QL STRIP: NEGATIVE
PROTEIN, POC: ABNORMAL
SP GR UR STRIP: 1.01 (ref 1–1.03)
SPECIFIC GRAVITY, POC UA: 1
URN SPEC COLLECT METH UR: NORMAL
UROBILINOGEN, POC UA: NORMAL

## 2020-02-17 PROCEDURE — 99214 OFFICE O/P EST MOD 30 MIN: CPT | Mod: 25,S$GLB,, | Performed by: PHYSICIAN ASSISTANT

## 2020-02-17 PROCEDURE — 99999 PR PBB SHADOW E&M-EST. PATIENT-LVL III: ICD-10-PCS | Mod: PBBFAC,,, | Performed by: PHYSICIAN ASSISTANT

## 2020-02-17 PROCEDURE — 3077F SYST BP >= 140 MM HG: CPT | Mod: CPTII,S$GLB,, | Performed by: PHYSICIAN ASSISTANT

## 2020-02-17 PROCEDURE — 81001 URINALYSIS AUTO W/SCOPE: CPT | Mod: S$GLB,,, | Performed by: PHYSICIAN ASSISTANT

## 2020-02-17 PROCEDURE — 3080F DIAST BP >= 90 MM HG: CPT | Mod: CPTII,S$GLB,, | Performed by: PHYSICIAN ASSISTANT

## 2020-02-17 PROCEDURE — 87186 SC STD MICRODIL/AGAR DIL: CPT

## 2020-02-17 PROCEDURE — 3008F PR BODY MASS INDEX (BMI) DOCUMENTED: ICD-10-PCS | Mod: CPTII,S$GLB,, | Performed by: PHYSICIAN ASSISTANT

## 2020-02-17 PROCEDURE — 87077 CULTURE AEROBIC IDENTIFY: CPT

## 2020-02-17 PROCEDURE — 81001 POCT URINALYSIS, DIPSTICK OR TABLET REAGENT, AUTOMATED, WITH MICROSCOP: ICD-10-PCS | Mod: S$GLB,,, | Performed by: PHYSICIAN ASSISTANT

## 2020-02-17 PROCEDURE — 99214 PR OFFICE/OUTPT VISIT, EST, LEVL IV, 30-39 MIN: ICD-10-PCS | Mod: 25,S$GLB,, | Performed by: PHYSICIAN ASSISTANT

## 2020-02-17 PROCEDURE — 81003 URINALYSIS AUTO W/O SCOPE: CPT

## 2020-02-17 PROCEDURE — 3008F BODY MASS INDEX DOCD: CPT | Mod: CPTII,S$GLB,, | Performed by: PHYSICIAN ASSISTANT

## 2020-02-17 PROCEDURE — 87088 URINE BACTERIA CULTURE: CPT

## 2020-02-17 PROCEDURE — 99999 PR PBB SHADOW E&M-EST. PATIENT-LVL III: CPT | Mod: PBBFAC,,, | Performed by: PHYSICIAN ASSISTANT

## 2020-02-17 PROCEDURE — 3080F PR MOST RECENT DIASTOLIC BLOOD PRESSURE >= 90 MM HG: ICD-10-PCS | Mod: CPTII,S$GLB,, | Performed by: PHYSICIAN ASSISTANT

## 2020-02-17 PROCEDURE — 87086 URINE CULTURE/COLONY COUNT: CPT

## 2020-02-17 PROCEDURE — 3077F PR MOST RECENT SYSTOLIC BLOOD PRESSURE >= 140 MM HG: ICD-10-PCS | Mod: CPTII,S$GLB,, | Performed by: PHYSICIAN ASSISTANT

## 2020-02-17 RX ORDER — PHENAZOPYRIDINE HYDROCHLORIDE 200 MG/1
200 TABLET, FILM COATED ORAL
Qty: 6 TABLET | Refills: 0 | Status: SHIPPED | OUTPATIENT
Start: 2020-02-17 | End: 2020-02-19

## 2020-02-17 NOTE — PROGRESS NOTES
Pre-Visit Chart Review  For Appointment Scheduled on (2/17/20)    Health Maintenance Due   Topic Date Due    LDCT Lung Screen  11/15/2019    Fecal Occult Blood Test (FOBT)/[employer]  12/28/2019

## 2020-02-17 NOTE — PROGRESS NOTES
Subjective:       Patient ID: Jesi Mcginnis is a 59 y.o. female.    Chief Complaint: Urinary Tract Infection and Diarrhea    Patient with history of recurrent UTI presents for evaluation of dysuria.  She was treated with antibiotics 1 month ago for UTI.  She states that her symptoms improved but she again developed symptoms within the past 1 week.  Patient takes Cipro 250 mg prophylactically with intercourse.  Patient has a history of C diff colitis approximately 2 years ago.  She was treated successfully with antibiotics.  Patient states that within the past 1 week she has developed diarrhea.  She has not taken any medication to try to help with the diarrhea    Dysuria    This is a new problem. The current episode started in the past 7 days. The problem has been gradually worsening. The pain is at a severity of 6/10. The pain is mild. There has been no fever. She is sexually active. Associated symptoms include flank pain, hesitancy and urgency. Pertinent negatives include no behavior changes, chills, discharge, frequency, hematuria, nausea, possible pregnancy, sweats, vomiting, weight loss, constipation, rash or withholding. She has tried home medications for the symptoms. The treatment provided mild relief. Her past medical history is significant for recurrent UTIs. There is no history of catheterization, diabetes insipidus, diabetes mellitus, genitourinary reflux, hypertension, kidney stones, a single kidney, STD, urinary stasis or a urological procedure. history of c diff.    Diarrhea    This is a recurrent problem. The current episode started 1 to 4 weeks ago. The problem occurs 5 to 10 times per day. The problem has been unchanged. The stool consistency is described as watery. The patient states that diarrhea does not awaken her from sleep. Pertinent negatives include no abdominal pain, bloating, chills, fever, sweats, vomiting or weight loss. Risk factors include recent antibiotic use. She has tried  nothing for the symptoms. history of c diff.      Review of Systems   Constitutional: Negative for chills, fever and weight loss.   Gastrointestinal: Positive for diarrhea. Negative for abdominal distention, abdominal pain, anal bleeding, bloating, blood in stool, constipation, nausea and vomiting.   Genitourinary: Positive for dysuria, flank pain, hesitancy and urgency. Negative for decreased urine volume, difficulty urinating, enuresis, frequency, genital sores, hematuria and pelvic pain.   Musculoskeletal: Negative for back pain.   Skin: Negative for rash.       Objective:      Physical Exam   Constitutional: She appears well-developed and well-nourished. No distress.   Cardiovascular: Normal rate and regular rhythm.   Pulmonary/Chest: Effort normal and breath sounds normal.   Abdominal: Soft. Normal appearance and bowel sounds are normal. She exhibits no distension. There is no tenderness. There is no rigidity, no rebound, no guarding and no CVA tenderness.       Assessment:       1. Recurrent UTI    2. Dysuria    3. Diarrhea, unspecified type    4. History of Clostridioides difficile colitis        Plan:       Jesi was seen today for urinary tract infection and diarrhea.    Diagnoses and all orders for this visit:    Recurrent UTI  -     POCT urinalysis, dipstick or tablet reag  -     Urine culture  -     Urinalysis    Dysuria    phenazopyridine (PYRIDIUM) 200 MG tablet; Take 1 tablet (200 mg total) by mouth 3 (three) times daily with meals. for 2 days    Diarrhea, unspecified type  -     CLOSTRIDIUM DIFFICILE; Future    History of Clostridioides difficile colitis  -     CLOSTRIDIUM DIFFICILE; Future    Other orders  -            No follow-ups on file.   DISCLAIMER: This note was prepared with [employer] voice recognition transcription software. Garbled syntax, mangled pronouns, and other bizarre constructions may be attributed to that software system

## 2020-02-18 ENCOUNTER — LAB VISIT (OUTPATIENT)
Dept: LAB | Facility: HOSPITAL | Age: 60
End: 2020-02-18
Attending: PHYSICIAN ASSISTANT
Payer: COMMERCIAL

## 2020-02-18 DIAGNOSIS — Z86.19 HISTORY OF CLOSTRIDIOIDES DIFFICILE COLITIS: ICD-10-CM

## 2020-02-18 DIAGNOSIS — R19.7 DIARRHEA, UNSPECIFIED TYPE: ICD-10-CM

## 2020-02-18 LAB
C DIFF GDH STL QL: NEGATIVE
C DIFF TOX A+B STL QL IA: NEGATIVE

## 2020-02-18 PROCEDURE — 87324 CLOSTRIDIUM AG IA: CPT

## 2020-02-18 PROCEDURE — 87449 NOS EACH ORGANISM AG IA: CPT

## 2020-02-19 RX ORDER — LOSARTAN POTASSIUM AND HYDROCHLOROTHIAZIDE 12.5; 5 MG/1; MG/1
1 TABLET ORAL DAILY
Qty: 90 TABLET | Refills: 3 | Status: SHIPPED | OUTPATIENT
Start: 2020-02-19 | End: 2020-04-20

## 2020-02-20 LAB — BACTERIA UR CULT: ABNORMAL

## 2020-02-24 ENCOUNTER — PATIENT OUTREACH (OUTPATIENT)
Dept: ADMINISTRATIVE | Facility: HOSPITAL | Age: 60
End: 2020-02-24

## 2020-02-24 DIAGNOSIS — Z12.12 SCREENING FOR COLORECTAL CANCER: Primary | ICD-10-CM

## 2020-02-24 DIAGNOSIS — Z12.11 SCREENING FOR COLORECTAL CANCER: Primary | ICD-10-CM

## 2020-02-24 NOTE — PROGRESS NOTES
Chart review completed 02/24/2020.  Care Everywhere updates requested and reviewed.  Immunizations reconciled. Media reviewed.       WOG orders placed. [facility]  Letter mailed.  YES (PORTAL)    Health Maintenance Due   Topic Date Due    HIV Screening  07/11/1975    Shingles Vaccine (1 of 2) 07/11/2012    Colonoscopy  07/11/2012    LDCT Lung Screen  11/15/2019   DIG MED HTN

## 2020-03-06 ENCOUNTER — DOCUMENTATION ONLY (OUTPATIENT)
Dept: FAMILY MEDICINE | Facility: CLINIC | Age: 60
End: 2020-03-06

## 2020-03-06 NOTE — PROGRESS NOTES
Pre-Visit Chart Review  For Appointment Scheduled on 3/9/2020    Health Maintenance Due   Topic Date Due    Colonoscopy  07/11/2012    LDCT Lung Screen  11/15/2019

## 2020-03-10 ENCOUNTER — TELEPHONE (OUTPATIENT)
Dept: FAMILY MEDICINE | Facility: CLINIC | Age: 60
End: 2020-03-10

## 2020-04-01 ENCOUNTER — PATIENT MESSAGE (OUTPATIENT)
Dept: ADMINISTRATIVE | Facility: OTHER | Age: 60
End: 2020-04-01

## 2020-04-20 ENCOUNTER — PATIENT MESSAGE (OUTPATIENT)
Dept: ADMINISTRATIVE | Facility: OTHER | Age: 60
End: 2020-04-20

## 2020-04-20 ENCOUNTER — TELEPHONE (OUTPATIENT)
Dept: FAMILY MEDICINE | Facility: CLINIC | Age: 60
End: 2020-04-20

## 2020-04-20 RX ORDER — HYDROCHLOROTHIAZIDE 12.5 MG/1
12.5 TABLET ORAL DAILY
Qty: 90 TABLET | Refills: 3 | Status: SHIPPED | OUTPATIENT
Start: 2020-04-20 | End: 2022-07-28

## 2020-04-20 RX ORDER — LOSARTAN POTASSIUM 50 MG/1
50 TABLET ORAL DAILY
Qty: 90 TABLET | Refills: 3 | Status: SHIPPED | OUTPATIENT
Start: 2020-04-20 | End: 2022-07-28

## 2020-04-20 NOTE — TELEPHONE ENCOUNTER
----- Message from Danie Sanchez sent at 4/20/2020  1:55 PM CDT -----  Type:  Pharmacy Calling to Clarify an RX    Name of Caller: Ana Laura  Pharmacy Name:    [facility] - [place], [place] - [address] [address]  [id number]  [place] [address]  Phone: [phone] Fax: [phone]    Prescription Name:  losartan-hydrochlorothiazide 50-12.5 mg (HYZAAR) 50-12.5 mg per tablet  What do they need to clarify?:  No longer available as a combo-Wants to know if the 2 drugs can be .    Best Call Back Number:   [phone]  Additional Information:

## 2020-04-22 ENCOUNTER — PATIENT OUTREACH (OUTPATIENT)
Dept: OTHER | Facility: OTHER | Age: 60
End: 2020-04-22

## 2020-04-22 NOTE — LETTER
April 27, 2020     Jesi Mcginnis  [address] [address] [address]       Dear Jesi,    Welcome to [facility] Our goal is to make care effective, proactive and convenient by using data you send us from home to better treat your chronic conditions.              My name is Yumiko Gentile, and I am your dedicated Digital Medicine clinician. As an expert in medication management, I will help ensure that the medications you are taking continue to provide the intended benefits and help you reach your goals. You can reach me directly at [phone] or by sending me a message directly through your [facility] account.      I am Doreen Hitchcock and I will be your health . My job is to help you identify lifestyle changes to improve your disease control. We will talk about nutrition, exercise, and other ways you may be able to adjust your current habits to better your health. Additionally, we will help ensure you are completing the tests and screenings that are necessary to help manage your conditions. You can reach me directly at [phone] or by sending me a message directly through your [facility] account.    Most importantly, YOU are at the center of this team. Together, we will work to improve your overall health and encourage you to meet your goals for a healthier lifestyle.     What we expect from YOU:  · Please take frequent home blood pressure measurements. We ask that you take at least 1 blood pressure reading per week, but more information will better help us get you know you. Be sure you rest for a few minutes before taking the reading in a quiet, comfortable place.     Be available to receive phone calls or [employer] messages, when appropriate, from your care team. Please let us know if there are any specific days or times that work best for us to reach you via phone.     Complete routine tests and screenings. Dont worry, we will help keep you on track!            What you should expect from your Digital Medicine Care Team:   We will work with you to create a personalized plan of care and provide you with encouragement and education, including regarding lifestyle changes, that could help you manage your disease states.     We will adjust your current medications, if needed, and continue to monitor your long-term progress.     We will provide you and your physician with monthly progress reports after you have been in the program for more than 30 days.     We will send you reminders through [employer] and text messages to help ensure you do not miss any testing deadlines to help manage your disease states.    You will be able to reach us by phone or through your [facility] account by clicking our names under Care Team on the right side of the home screen.    I look forward to working with you to achieve your blood pressure goals!    We look forward to working with you to help manage your health,    Sincerely,    Your Digital Medicine Team    Please visit our websites to learn more:   · Hypertension: [email or web address]      Remember, we are not available for emergencies. If you have an emergency, please contact your doctors office directly or call [facility] ([phone] or [phone]) or [phone].

## 2020-04-27 NOTE — PROGRESS NOTES
Digital Medicine: Health  Introduction    Introduced Jesi Mcginnis to Digital Medicine. Discussed health  role and recommended lifestyle modifications.    The history is provided by the patient.     HYPERTENSION  Our goal is to get BP to consistently below 130/80mmHg and make the process convenient so patient can avoid extra trips to the office. Getting your blood pressure below 130/80mmHg (definition of control) will reduce your risk for heart attack, kidney failure, stroke and death (as well as kidney failure, eye disease, & dementia)      Reviewed that the Digital Medicine care team - consisting of a clinician and a health  - will follow the most current evidence-based national guidelines for treating your condition.  The health  will focus on lifestyle modifications and motivation while the clinician will focus on medication therapy.  The care team will review all data on a regular basis and reach out as needed.      Explained that one of the key parts of the program is communication with the care team.  Asked patient to respond to outreach attempts and complete questionnaires.  Stressed importance of medication adherence.    Explained that we expect patient to obtain several blood pressures per week at random times of day.  Instructed patient not to allow anyone else to use phone and monitoring device.  Confirmed appropriate BP monitoring technique.      Explained to patient that the digital medicine team is not available for emergencies.  Patient will call [facility] on-call ([phone] or [phone]) or [phone] if needed.      Patient's BP goal is 130/80.Patient's BP average is 114/80 mmHg, which is at or below goal, per 2017 ACC/AHA Hypertension Guidelines.    Patient has been putting readings in manually, she states her bluetooth is turned on and does not know why the readings are not being uploaded directly.            Troubleshooting Devices: tech support needed        Last 5 Patient  Entered Readings                                      Current 30 Day Average: 114/80     Recent Readings 4/27/2020 4/25/2020 4/24/2020 4/22/2020 4/21/2020    SBP (mmHg) 121 136 101 109 113    DBP (mmHg) 81 89 73 74 80    Pulse 76 85 85 85 82            INTERVENTION(S)  encouragement/support    PLAN  patient verbalizes understanding, patient amenable to changes, Clinician follow-up and continue monitoring    Patient requested that we conduct the health  call at our next encounter, will retrieve dietary and exercise background information during our next call. Will place CRM ticket for patient to have readings transmit automatically. Plan to follow up in 2 weeks      There are no preventive care reminders to display for this patient.    Reviewed the importance of self-monitoring, medication adherence, and that the health  can be used as a resource for lifestyle modifications to help reduce or maintain a healthy lifestyle.    Sent link to [facility] [employer] webpages and my contact information via [employer] for future questions. Follow up scheduled.         Screenings    SDOH

## 2020-05-05 ENCOUNTER — PATIENT MESSAGE (OUTPATIENT)
Dept: ADMINISTRATIVE | Facility: HOSPITAL | Age: 60
End: 2020-05-05

## 2020-05-11 ENCOUNTER — PATIENT OUTREACH (OUTPATIENT)
Dept: OTHER | Facility: OTHER | Age: 60
End: 2020-05-11

## 2020-06-04 ENCOUNTER — PATIENT OUTREACH (OUTPATIENT)
Dept: OTHER | Facility: OTHER | Age: 60
End: 2020-06-04

## 2020-06-04 NOTE — PROGRESS NOTES
06/04/2020 11:01 AM Called patient and left voicemail with call back number. Will follow up with patient at next encounter.

## 2020-07-02 NOTE — PROGRESS NOTES
Digital Medicine: Clinician Introduction    Jesi Mcginnis is a 59 y.o. female who is newly enrolled in the Digital Medicine Clinic.    The following information was reviewed and updated:  Preferred pharmacy   [facility] - [place], [place] - [address]  [address]  [place] [address]  Phone: [phone] Fax: [phone]    [facility] - - [facility] [address]  [address] [facility] [address]  Phone: [phone] Fax: [phone]      Patient prefers a 90 days supply.     Review of patient's allergies indicates:  No Known Allergies    Called patient to follow up. Patient endorses adherence to medication regimen. Patient denies hypotensive s/sx (lightheadedness, dizziness, nausea, fatigue); patient denies hypertensive s/sx (SOB, CP, severe headaches, changes in vision). Instructed patient to seek medical care if BP > 180/110 and is accompanied by hypertensive s/sx associated, patient confirms understanding.     She reports that she checks her BP while seated on her couch with her feet flat on the floor and her arm resting on the arm rest of the couch. She denies having a dining table, kitchen table or desk at home to sit at. She also states hat she only has a counter top but since it does not allow her to place her feet flat on the floor, she does not sit there.      The history is provided by the patient. No  was used.     HYPERTENSION  Our goal is to get BP to consistently below 130/80mmHg and make the process convenient so patient can avoid extra trips to the office. Getting your blood pressure below 130/80mmHg (definition of control) will reduce your risk for heart attack, kidney failure, stroke and death (as well as kidney failure, eye disease, & dementia)      Reviewed non-pharmacologic therapies and impact on BP      Explained that we expect patient to obtain several blood pressures per week at random times of  day.  Instructed patient not to allow anyone else to use phone and monitoring device.  Confirmed appropriate BP monitoring technique.      Explained to patient that the digital medicine team is not available for emergencies.  Patient will call [facility] on-call ([phone] or [phone]) or [phone] if needed.    Patient's BP goal is 130/80. Patients BP average is 134/93 mmHg, which is above goal, per 2017 ACC/AHA Hypertension Guidelines.  When asked what the patient thinks is causing BP to be elevated, they state: improper technique      Assessment:  Reviewed recent readings. Per 2017 ACC/ AHA HTN guidelines (goal of BP < 130/80), current 30-day average need to be addressed more throroughly today.       Med Review complete.    Allergies reviewed.      Last 5 Patient Entered Readings                                      Current 30 Day Average: 134/93     Recent Readings 6/27/2020 6/17/2020 6/5/2020 6/5/2020 5/26/2020    SBP (mmHg) 142 131 129 129 135    DBP (mmHg) 90 93 91 96 95    Pulse 74 86 86 86 84            INTERVENTION(S)  reviewed appropriate dose schedule, reviewed monitoring technique, encouragement/support and goal setting    PLAN  patient verbalizes understanding, patient amenable to changes, additional monitoring needed and continue monitoring    >Continue current medication regimen.  >If BP remains elevated, will increase losartan dose to 100 mg daily and get a repeat BMP.  >Informed patient of proper BP measurement techniques given the setup that she has at home.   >I will continue to monitor regularly and will follow-up in 4 weeks, sooner if blood pressure begins to trend upward or downward.   >Patient denies having questions or concerns. Patient has my contact information and knows to call with any concerns or clinical changes.      There are no preventive care reminders to display for this patient.    Current Medication Regimen:  Hypertension Medications             hydroCHLOROthiazide  (HYDRODIURIL) 12.5 MG Tab Take 1 tablet (12.5 mg total) by mouth once daily.    losartan (COZAAR) 50 MG tablet Take 1 tablet (50 mg total) by mouth once daily.            Reviewed the importance of self-monitoring, medication adherence, and that the health  can be used as a resource for lifestyle modifications to help reduce or maintain a healthy lifestyle.    Sent link to [facility] [employer] webpages and my contact information via [employer] for future questions. Follow up scheduled.                     Medication Adherence Screening   She missed a dose more than once this month.

## 2020-07-09 NOTE — PROGRESS NOTES
Digital Medicine: Health  Follow-Up    The history is provided by the patient.   Follow Up  Follow-up reason(s): reading review          INTERVENTION(S)  recommended diet modifications, encouragement/support and denied questions    PLAN  patient verbalizes understanding, Clinician follow-up and continue monitoring    Patient states her BP runs high because she has neck pain and does not like to take pain meds.     Plan to follow up in 4 weeks.         There are no preventive care reminders to display for this patient.    Last 5 Patient Entered Readings                                      Current 30 Day Average: 137/92     Recent Readings 6/27/2020 6/17/2020 6/5/2020 6/5/2020 5/26/2020    SBP (mmHg) 142 131 129 129 135    DBP (mmHg) 90 93 91 96 95    Pulse 74 86 86 86 84                      Diet Screening   Breakfast is typically between. Typically does not eat breakfast, will have 2 cups of coffee.  Lunch is typically between. Usually picks up something from a restaurant .    Dinner is typically between.  will cook a meal at home, typically a meal with veggies and a little meat .    Snacks are typically. Chips .    Patient reports eating or drinking the following: water, fresh vegetables, caffeine, packaged/processed foods and restaurant foodShe has the following dietary restrictions: low sodium dietShe has no standard fasting periods.      The patient states that she typically eats a non-home cooked meal (ex: dining out, take out, frozen meals) 5 or more times per week.  She has meals prepared by family and utilizes a .      Reviewed with patient that meals from restaurant or fast food places typically have a lot of salt. Even healthy dishes can have more than a day's worth of sodium. Recommended that patient look up the menu ahead of time and look up the nutrition facts and sodium content if it's available.     Patient does not typically salt her food to taste.    Normally does not look  "at nutrition labels. Informed patient looking at food labels is a great and easy way to monitor sodium intake.    Discussed with patient that increasing water intake and consuming foods that are rich in potassium can help reduce sodium in the body. Reviewed foods that are rich in potassium     Intervention(s): reducing sodium intake, reading food labels, sodium reduction while dining out and increasing water intake    Physical Activity Screening   When asked if exercising, patient responded: no    She identified the following barriers to physical activity: pain/injury/recent surgery and motivation    Patient states her only exercise is "chasing her grandson around."       Medication Adherence Screening   She did not miss a dose this month.    Tobacco and Alcohol Screening       No tobacco use     Occasionally a glass of wine         SDOH  "

## 2020-08-06 ENCOUNTER — PATIENT OUTREACH (OUTPATIENT)
Dept: OTHER | Facility: OTHER | Age: 60
End: 2020-08-06

## 2020-08-06 NOTE — PROGRESS NOTES
Patient has not submitted any recent data for HTN Digital Medicine. Will follow up once more data is available to review.     Last 5 Patient Entered Readings                                      Current 30 Day Average:      Recent Readings 6/27/2020 6/17/2020 6/5/2020 6/5/2020 5/26/2020    SBP (mmHg) 142 131 129 129 135    DBP (mmHg) 90 93 91 96 95    Pulse 74 86 86 86 84

## 2020-09-09 NOTE — PROGRESS NOTES
Digital Medicine: Health  Follow-Up    The history is provided by the patient.                 Patient needs assistance troubleshooting: patient reminder needed.    Additional Follow-up details: Patient states she has not taken any readings because she has been busy with her grandson.    Discussed with patient to ensure she is taking at least one reading per week to remain active in [facility].    Patient agreed she will start taking readings at least once weekly.     Discussed tools to help her remember to take readings.          Lifestyle    Additional monitoring needed.       Addressed any questions or concerns and patient has my contact information if needed prior to next outreach. Patient verbalizes understanding.      Explained the importance of self-monitoring and medication adherence. Encouraged the patient to communicate with their health  for lifestyle modifications to help improve or maintain a healthy lifestyle.            There are no preventive care reminders to display for this patient.    Last 5 Patient Entered Readings                                      Current 30 Day Average:      Recent Readings 6/27/2020 6/17/2020 6/5/2020 6/5/2020 5/26/2020    SBP (mmHg) 142 131 129 129 135    DBP (mmHg) 90 93 91 96 95    Pulse 74 86 86 86 84

## 2020-10-02 ENCOUNTER — PATIENT OUTREACH (OUTPATIENT)
Dept: OTHER | Facility: OTHER | Age: 60
End: 2020-10-02

## 2020-10-08 ENCOUNTER — PATIENT OUTREACH (OUTPATIENT)
Dept: OTHER | Facility: OTHER | Age: 60
End: 2020-10-08

## 2020-10-08 DIAGNOSIS — I10 ESSENTIAL HYPERTENSION: Primary | ICD-10-CM

## 2020-10-09 ENCOUNTER — TELEPHONE (OUTPATIENT)
Dept: FAMILY MEDICINE | Facility: CLINIC | Age: 60
End: 2020-10-09

## 2020-10-12 ENCOUNTER — PATIENT MESSAGE (OUTPATIENT)
Dept: FAMILY MEDICINE | Facility: CLINIC | Age: 60
End: 2020-10-12

## 2020-10-13 ENCOUNTER — TELEPHONE (OUTPATIENT)
Dept: FAMILY MEDICINE | Facility: CLINIC | Age: 60
End: 2020-10-13

## 2020-10-13 ENCOUNTER — TELEPHONE (OUTPATIENT)
Dept: ADMINISTRATIVE | Facility: HOSPITAL | Age: 60
End: 2020-10-13

## 2020-10-30 ENCOUNTER — PATIENT MESSAGE (OUTPATIENT)
Dept: ADMINISTRATIVE | Facility: HOSPITAL | Age: 60
End: 2020-10-30

## 2021-01-04 ENCOUNTER — PATIENT MESSAGE (OUTPATIENT)
Dept: ADMINISTRATIVE | Facility: HOSPITAL | Age: 61
End: 2021-01-04

## 2021-04-05 ENCOUNTER — PATIENT MESSAGE (OUTPATIENT)
Dept: ADMINISTRATIVE | Facility: HOSPITAL | Age: 61
End: 2021-04-05

## 2021-04-16 ENCOUNTER — CLINICAL SUPPORT (OUTPATIENT)
Dept: OTHER | Facility: CLINIC | Age: 61
End: 2021-04-16
Payer: COMMERCIAL

## 2021-04-16 DIAGNOSIS — Z00.8 ENCOUNTER FOR OTHER GENERAL EXAMINATION: ICD-10-CM

## 2021-04-16 PROCEDURE — 82947 ASSAY GLUCOSE BLOOD QUANT: CPT | Mod: QW,S$GLB,, | Performed by: INTERNAL MEDICINE

## 2021-04-16 PROCEDURE — 80061 LIPID PANEL: CPT | Mod: QW,S$GLB,, | Performed by: INTERNAL MEDICINE

## 2021-04-16 PROCEDURE — 99401 PR PREVENT COUNSEL,INDIV,15 MIN: ICD-10-PCS | Mod: 25,S$GLB,, | Performed by: INTERNAL MEDICINE

## 2021-04-16 PROCEDURE — 99401 PREV MED CNSL INDIV APPRX 15: CPT | Mod: 25,S$GLB,, | Performed by: INTERNAL MEDICINE

## 2021-04-16 PROCEDURE — 82947 PR  ASSAY QUANTITATIVE,BLOOD GLUCOSE: ICD-10-PCS | Mod: QW,S$GLB,, | Performed by: INTERNAL MEDICINE

## 2021-04-16 PROCEDURE — 80061 PR  LIPID PANEL: ICD-10-PCS | Mod: QW,S$GLB,, | Performed by: INTERNAL MEDICINE

## 2021-04-18 VITALS — BODY MASS INDEX: 24.62 KG/M2 | HEIGHT: 65 IN

## 2021-04-18 LAB
GLUCOSE SERPL-MCNC: 77 MG/DL (ref 60–140)
HDLC SERPL-MCNC: 101 MG/DL
POC CHOLESTEROL, TOTAL: 241 MG/DL
TRIGL SERPL-MCNC: 230 MG/DL

## 2021-04-29 ENCOUNTER — PATIENT MESSAGE (OUTPATIENT)
Dept: RESEARCH | Facility: HOSPITAL | Age: 61
End: 2021-04-29

## 2021-07-06 ENCOUNTER — PATIENT MESSAGE (OUTPATIENT)
Dept: ADMINISTRATIVE | Facility: HOSPITAL | Age: 61
End: 2021-07-06

## 2021-10-07 ENCOUNTER — PATIENT MESSAGE (OUTPATIENT)
Dept: ADMINISTRATIVE | Facility: HOSPITAL | Age: 61
End: 2021-10-07

## 2021-11-23 ENCOUNTER — PATIENT OUTREACH (OUTPATIENT)
Dept: ADMINISTRATIVE | Facility: HOSPITAL | Age: 61
End: 2021-11-23
Payer: COMMERCIAL

## 2021-11-23 DIAGNOSIS — Z12.11 SCREENING FOR MALIGNANT NEOPLASM OF COLON: Primary | ICD-10-CM

## 2022-05-12 DIAGNOSIS — M25.571 RIGHT ANKLE PAIN, UNSPECIFIED CHRONICITY: Primary | ICD-10-CM

## 2022-05-13 ENCOUNTER — OFFICE VISIT (OUTPATIENT)
Dept: URGENT CARE | Facility: CLINIC | Age: 62
End: 2022-05-13
Payer: COMMERCIAL

## 2022-05-13 ENCOUNTER — TELEPHONE (OUTPATIENT)
Dept: ORTHOPEDICS | Facility: CLINIC | Age: 62
End: 2022-05-13
Payer: COMMERCIAL

## 2022-05-13 VITALS
DIASTOLIC BLOOD PRESSURE: 77 MMHG | HEIGHT: 65 IN | RESPIRATION RATE: 16 BRPM | WEIGHT: 150 LBS | TEMPERATURE: 98 F | HEART RATE: 79 BPM | OXYGEN SATURATION: 98 % | BODY MASS INDEX: 24.99 KG/M2 | SYSTOLIC BLOOD PRESSURE: 114 MMHG

## 2022-05-13 DIAGNOSIS — S82.64XA CLOSED NONDISPLACED FRACTURE OF LATERAL MALLEOLUS OF RIGHT FIBULA, INITIAL ENCOUNTER: ICD-10-CM

## 2022-05-13 DIAGNOSIS — M79.671 RIGHT FOOT PAIN: ICD-10-CM

## 2022-05-13 DIAGNOSIS — M25.571 ACUTE RIGHT ANKLE PAIN: Primary | ICD-10-CM

## 2022-05-13 PROCEDURE — 1160F RVW MEDS BY RX/DR IN RCRD: CPT | Mod: CPTII,S$GLB,, | Performed by: NURSE PRACTITIONER

## 2022-05-13 PROCEDURE — 3008F BODY MASS INDEX DOCD: CPT | Mod: CPTII,S$GLB,, | Performed by: NURSE PRACTITIONER

## 2022-05-13 PROCEDURE — 73630 XR FOOT COMPLETE 3 VIEW RIGHT: ICD-10-PCS | Mod: RT,S$GLB,, | Performed by: RADIOLOGY

## 2022-05-13 PROCEDURE — 3078F DIAST BP <80 MM HG: CPT | Mod: CPTII,S$GLB,, | Performed by: NURSE PRACTITIONER

## 2022-05-13 PROCEDURE — 73630 X-RAY EXAM OF FOOT: CPT | Mod: RT,S$GLB,, | Performed by: RADIOLOGY

## 2022-05-13 PROCEDURE — 73610 XR ANKLE COMPLETE 3 VIEW RIGHT: ICD-10-PCS | Mod: RT,S$GLB,, | Performed by: RADIOLOGY

## 2022-05-13 PROCEDURE — 1159F MED LIST DOCD IN RCRD: CPT | Mod: CPTII,S$GLB,, | Performed by: NURSE PRACTITIONER

## 2022-05-13 PROCEDURE — 1160F PR REVIEW ALL MEDS BY PRESCRIBER/CLIN PHARMACIST DOCUMENTED: ICD-10-PCS | Mod: CPTII,S$GLB,, | Performed by: NURSE PRACTITIONER

## 2022-05-13 PROCEDURE — 1159F PR MEDICATION LIST DOCUMENTED IN MEDICAL RECORD: ICD-10-PCS | Mod: CPTII,S$GLB,, | Performed by: NURSE PRACTITIONER

## 2022-05-13 PROCEDURE — 3074F PR MOST RECENT SYSTOLIC BLOOD PRESSURE < 130 MM HG: ICD-10-PCS | Mod: CPTII,S$GLB,, | Performed by: NURSE PRACTITIONER

## 2022-05-13 PROCEDURE — 3074F SYST BP LT 130 MM HG: CPT | Mod: CPTII,S$GLB,, | Performed by: NURSE PRACTITIONER

## 2022-05-13 PROCEDURE — 29515 APPLICATION SHORT LEG SPLINT: CPT | Mod: S$GLB,,, | Performed by: NURSE PRACTITIONER

## 2022-05-13 PROCEDURE — 29515 PR APPLY LOWER LEG SPLINT: ICD-10-PCS | Mod: S$GLB,,, | Performed by: NURSE PRACTITIONER

## 2022-05-13 PROCEDURE — 3078F PR MOST RECENT DIASTOLIC BLOOD PRESSURE < 80 MM HG: ICD-10-PCS | Mod: CPTII,S$GLB,, | Performed by: NURSE PRACTITIONER

## 2022-05-13 PROCEDURE — 99214 OFFICE O/P EST MOD 30 MIN: CPT | Mod: 25,S$GLB,, | Performed by: NURSE PRACTITIONER

## 2022-05-13 PROCEDURE — 73610 X-RAY EXAM OF ANKLE: CPT | Mod: RT,S$GLB,, | Performed by: RADIOLOGY

## 2022-05-13 PROCEDURE — 99214 PR OFFICE/OUTPT VISIT, EST, LEVL IV, 30-39 MIN: ICD-10-PCS | Mod: 25,S$GLB,, | Performed by: NURSE PRACTITIONER

## 2022-05-13 PROCEDURE — 3008F PR BODY MASS INDEX (BMI) DOCUMENTED: ICD-10-PCS | Mod: CPTII,S$GLB,, | Performed by: NURSE PRACTITIONER

## 2022-05-13 RX ORDER — TRAMADOL HYDROCHLORIDE 50 MG/1
50 TABLET ORAL EVERY 8 HOURS PRN
Qty: 15 EACH | Refills: 0 | Status: SHIPPED | OUTPATIENT
Start: 2022-05-13 | End: 2022-05-18

## 2022-05-13 NOTE — PROGRESS NOTES
"Subjective:       Patient ID: Jesi Mcginnis is a 61 y.o. female.    Vitals:  height is 5' 5" (1.651 m) and weight is 68 kg (150 lb). Her oral temperature is 97.6 °F (36.4 °C). Her blood pressure is 114/77 and her pulse is 79. Her respiration is 16 and oxygen saturation is 98%.     Chief Complaint: Fall    On Tuesday patient was carrying a heavy pot and when she stepped down off a step her ankle everted and she fell. No injury other than right ankle and foot. Progressive swelling and bruising and pain. Can weight bear but very painful. Has taken OTc pain relievers for pain.    Past Medical History:  No date: Abscess of right lung with pneumonia  05/2019: Cervical disc disorder at C4-C5 level with radiculopathy  05/2019: Degeneration of cervical disc without myelopathy    Past Surgical History:  5/23/2019: ANTERIOR CERVICAL DISCECTOMY W/ FUSION; N/A      Comment:  Procedure: DISCECTOMY, SPINE, CERVICAL, ANTERIOR                APPROACH, WITH FUSION C4-C5 C5-C6 C6-C7;  Surgeon: Marco A Cooley MD;  Location: [facility] OR;  Service:                Neurosurgery;  Laterality: N/A;  No date: AUGMENTATION OF BREAST; Bilateral  No date: baker's cyst removal; Right  No date: BREAST SURGERY  2/15/2019: EPIDURAL STEROID INJECTION INTO CERVICAL SPINE; N/A      Comment:  Procedure: Injection-steroid-epidural-cervical;                 Surgeon: Chet Bello MD;  Location: [facility] OR;  Service:                Pain Management;  Laterality: N/A;  C7-T1  No date: HYSTERECTOMY  No date: KNEE ARTHROSCOPY; Bilateral  No date: SHOULDER ARTHROSCOPY; Bilateral    Review of patient's family history indicates:  Problem: Rheum arthritis      Relation: Mother          Age of Onset: (Not Specified)  Problem: Arthritis      Relation: Mother          Age of Onset: (Not Specified)  Problem: Obesity      Relation: Mother          Age of Onset: (Not Specified)  Problem: Heart disease      Relation: Father          Age of Onset: (Not " Specified)  Problem: Parkinsonism      Relation: Father          Age of Onset: (Not Specified)  Problem: Obesity      Relation: Father          Age of Onset: (Not Specified)      Social History    Socioeconomic History      Marital status:     Tobacco Use      Smoking status: Former Smoker        Packs/day: 1.00        Years: 30.00        Pack years: 30        Types: Cigarettes        Quit date:         Years since quittin.3      Smokeless tobacco: Never Used      Tobacco comment: Patient now vapes, but is weaining off    Substance and Sexual Activity      Alcohol use: Yes        Alcohol/week: 7.0 standard drinks        Types: 7 Glasses of wine per week        Comment: glass of wine daily      Drug use: No      Sexual activity: Yes        Birth control/protection: See Surgical Hx      Current Outpatient Medications:  fluticasone propionate (FLONASE) 50 mcg/actuation nasal spray, 1 spray (50 mcg total) by Each Nostril route once daily., Disp: 16 g, Rfl: 6  gabapentin (NEURONTIN) 600 MG tablet, Take 600 mg by mouth 3 (three) times daily. TAKE AM OF SURGERY WITH A SIP OF WATER, Disp: , Rfl: 2  ciprofloxacin HCl (CIPRO) 250 MG tablet, Take 1 tablet (250 mg total) by mouth daily as needed., Disp: 15 tablet, Rfl: 2  hydroCHLOROthiazide (HYDRODIURIL) 12.5 MG Tab, Take 1 tablet (12.5 mg total) by mouth once daily., Disp: 90 tablet, Rfl: 3  losartan (COZAAR) 50 MG tablet, Take 1 tablet (50 mg total) by mouth once daily., Disp: 90 tablet, Rfl: 3  traZODone (DESYREL) 100 MG tablet, TAKE ONE TABLET BY MOUTH EVERY 24 HOURS, Disp: , Rfl: 3    No current facility-administered medications for this visit.      Review of patient's allergies indicates:  No Known Allergies    Fall  Incident onset: 3 days ago. The fall occurred while walking. She landed on concrete. There was no blood loss. Pain location: Right ankle. The pain is at a severity of 8/10. The pain is moderate. The symptoms are aggravated by movement.  Associated symptoms include tingling. Associated symptoms comments: Burning sensation. She has tried NSAID, ice and elevation (Epsom salt soaks) for the symptoms. The treatment provided no relief.       Constitution: Negative.   Respiratory: Negative.    Genitourinary: Negative.    Musculoskeletal: Positive for pain, trauma, joint swelling and abnormal ROM of joint.   Skin: Positive for abrasion and bruising. Negative for puncture wound.       Objective:      Physical Exam   Constitutional: She is oriented to person, place, and time.  Non-toxic appearance. No distress.   HENT:   Head: Normocephalic and atraumatic.   Cardiovascular: Normal rate.   Pulmonary/Chest: Effort normal. No respiratory distress.   Abdominal: Normal appearance.   Musculoskeletal:         General: Swelling, tenderness and signs of injury present.      Right ankle: She exhibits decreased range of motion, swelling and ecchymosis. She exhibits normal pulse. Tenderness. Lateral malleolus and medial malleolus tenderness found. Achilles tendon exhibits no defect and normal Finch's test results.      Right foot: Decreased range of motion. Normal capillary refill. Tenderness, bony tenderness and swelling present. No crepitus, laceration or foot drop.   Neurological: no focal deficit. She is alert and oriented to person, place, and time.   Skin: not right footbruising   Psychiatric: Her behavior is normal. Mood normal.         Assessment:       1. Acute right ankle pain    2. Right foot pain    3. Closed nondisplaced fracture of lateral malleolus of right fibula, initial encounter          Plan:       Xray shows non displaced fracture of Lateral malleolus. Discussed results with patient. Called [facility], message sent by Polly Holland to Dr Lin's nurse that patient, who is scheduled with Dr Lin Tuesday, has fracture and requested sooner appointment. States the office will contact patient directly, advised to go to Ed immediately for  worsening. Plaster Stirrup and Posterior ankle splints applied by staff, neurovascular check after splint application normal. Ibuprofen 600 mg in clinic x 1, advised to take every 8 hours, ice and elevate, no weight bearing at all until see by orthopedics, Ed precautions discussed. Tramadol prn pain sent to pharmacy. Patient states understanding. .   Acute right ankle pain  -     X-Ray Foot Complete Right; Future; Expected date: 05/13/2022  -     XR ANKLE COMPLETE 3 VIEW RIGHT; Future; Expected date: 05/13/2022    Right foot pain  -     X-Ray Foot Complete Right; Future; Expected date: 05/13/2022  -     XR ANKLE COMPLETE 3 VIEW RIGHT; Future; Expected date: 05/13/2022    Closed nondisplaced fracture of lateral malleolus of right fibula, initial encounter  -     Ambulatory referral/consult to Orthopedics  -     traMADoL (ULTRAM) 50 mg tablet; Take 1 tablet (50 mg total) by mouth every 8 (eight) hours as needed for Pain.  Dispense: 15 each; Refill: 0

## 2022-05-13 NOTE — TELEPHONE ENCOUNTER
Patient informed to stay with foot elevated and take her pain medication as prescribed. We will see her on 05/17 as scheduled

## 2022-05-13 NOTE — PATIENT INSTRUCTIONS
No weight bearing until evaluated by orthopedics, ED immediately for any worsening symptoms.   Elevate and Ice.   Ibuprofen 600 mg three times daily.

## 2022-05-13 NOTE — TELEPHONE ENCOUNTER
----- Message from Polly Holland MA sent at 5/13/2022  9:36 AM CDT -----  FX, may need to be seen sooner   Please call pt

## 2022-05-17 ENCOUNTER — HOSPITAL ENCOUNTER (OUTPATIENT)
Dept: RADIOLOGY | Facility: HOSPITAL | Age: 62
Discharge: HOME | End: 2022-05-17
Attending: ORTHOPAEDIC SURGERY
Payer: COMMERCIAL

## 2022-05-17 ENCOUNTER — OFFICE VISIT (OUTPATIENT)
Dept: ORTHOPEDICS | Facility: CLINIC | Age: 62
End: 2022-05-17
Payer: COMMERCIAL

## 2022-05-17 VITALS — HEIGHT: 65 IN | BODY MASS INDEX: 24.99 KG/M2 | WEIGHT: 150 LBS

## 2022-05-17 DIAGNOSIS — S82.61XA CLOSED FRACTURE OF DISTAL LATERAL MALLEOLUS OF RIGHT FIBULA, INITIAL ENCOUNTER: Primary | ICD-10-CM

## 2022-05-17 DIAGNOSIS — M25.571 RIGHT ANKLE PAIN, UNSPECIFIED CHRONICITY: ICD-10-CM

## 2022-05-17 PROCEDURE — 99999 PR PBB SHADOW E&M-EST. PATIENT-LVL III: ICD-10-PCS | Mod: PBBFAC,,, | Performed by: ORTHOPAEDIC SURGERY

## 2022-05-17 PROCEDURE — 99203 PR OFFICE/OUTPT VISIT, NEW, LEVL III, 30-44 MIN: ICD-10-PCS | Mod: S$GLB,,, | Performed by: ORTHOPAEDIC SURGERY

## 2022-05-17 PROCEDURE — 3008F BODY MASS INDEX DOCD: CPT | Mod: CPTII,S$GLB,, | Performed by: ORTHOPAEDIC SURGERY

## 2022-05-17 PROCEDURE — 99203 OFFICE O/P NEW LOW 30 MIN: CPT | Mod: S$GLB,,, | Performed by: ORTHOPAEDIC SURGERY

## 2022-05-17 PROCEDURE — 73610 X-RAY EXAM OF ANKLE: CPT | Mod: 26,RT,, | Performed by: RADIOLOGY

## 2022-05-17 PROCEDURE — 1159F MED LIST DOCD IN RCRD: CPT | Mod: CPTII,S$GLB,, | Performed by: ORTHOPAEDIC SURGERY

## 2022-05-17 PROCEDURE — 1159F PR MEDICATION LIST DOCUMENTED IN MEDICAL RECORD: ICD-10-PCS | Mod: CPTII,S$GLB,, | Performed by: ORTHOPAEDIC SURGERY

## 2022-05-17 PROCEDURE — 3008F PR BODY MASS INDEX (BMI) DOCUMENTED: ICD-10-PCS | Mod: CPTII,S$GLB,, | Performed by: ORTHOPAEDIC SURGERY

## 2022-05-17 PROCEDURE — 73610 X-RAY EXAM OF ANKLE: CPT | Mod: TC,PN,RT

## 2022-05-17 PROCEDURE — 73610 XR ANKLE COMPLETE 3 VIEW RIGHT: ICD-10-PCS | Mod: 26,RT,, | Performed by: RADIOLOGY

## 2022-05-17 PROCEDURE — 99999 PR PBB SHADOW E&M-EST. PATIENT-LVL III: CPT | Mod: PBBFAC,,, | Performed by: ORTHOPAEDIC SURGERY

## 2022-05-17 RX ORDER — OXYCODONE AND ACETAMINOPHEN 5; 325 MG/1; MG/1
1 TABLET ORAL EVERY 6 HOURS PRN
Qty: 30 TABLET | Refills: 0 | Status: SHIPPED | OUTPATIENT
Start: 2022-05-17 | End: 2022-07-28

## 2022-05-17 RX ORDER — CYCLOBENZAPRINE HCL 10 MG
10 TABLET ORAL 3 TIMES DAILY PRN
Qty: 30 TABLET | Refills: 1 | Status: SHIPPED | OUTPATIENT
Start: 2022-05-17 | End: 2022-06-14

## 2022-05-17 NOTE — PROGRESS NOTES
5/17/2022    Past Medical History:   Diagnosis Date    Abscess of right lung with pneumonia     Cervical disc disorder at C4-C5 level with radiculopathy 05/2019    Degeneration of cervical disc without myelopathy 05/2019       Past Surgical History:   Procedure Laterality Date    ANTERIOR CERVICAL DISCECTOMY W/ FUSION N/A 5/23/2019    Procedure: DISCECTOMY, SPINE, CERVICAL, ANTERIOR APPROACH, WITH FUSION C4-C5 C5-C6 C6-C7;  Surgeon: Marco A Cooley MD;  Location: [facility] OR;  Service: Neurosurgery;  Laterality: N/A;    AUGMENTATION OF BREAST Bilateral     baker's cyst removal Right     BREAST SURGERY      EPIDURAL STEROID INJECTION INTO CERVICAL SPINE N/A 2/15/2019    Procedure: Injection-steroid-epidural-cervical;  Surgeon: Chet Bello MD;  Location: [facility] OR;  Service: Pain Management;  Laterality: N/A;  C7-T1    HYSTERECTOMY      KNEE ARTHROSCOPY Bilateral     SHOULDER ARTHROSCOPY Bilateral        Current Outpatient Medications   Medication Sig    ciprofloxacin HCl (CIPRO) 250 MG tablet Take 1 tablet (250 mg total) by mouth daily as needed.    fluticasone propionate (FLONASE) 50 mcg/actuation nasal spray 1 spray (50 mcg total) by Each Nostril route once daily.    gabapentin (NEURONTIN) 600 MG tablet Take 600 mg by mouth 3 (three) times daily. TAKE AM OF SURGERY WITH A SIP OF WATER    hydroCHLOROthiazide (HYDRODIURIL) 12.5 MG Tab Take 1 tablet (12.5 mg total) by mouth once daily.    losartan (COZAAR) 50 MG tablet Take 1 tablet (50 mg total) by mouth once daily.    traMADoL (ULTRAM) 50 mg tablet Take 1 tablet (50 mg total) by mouth every 8 (eight) hours as needed for Pain.    traZODone (DESYREL) 100 MG tablet TAKE ONE TABLET BY MOUTH EVERY 24 HOURS     No current facility-administered medications for this visit.       Review of patient's allergies indicates:  No Known Allergies    Family History   Problem Relation Age of Onset    Rheum arthritis Mother     Arthritis Mother     Obesity Mother      Heart disease Father     Parkinsonism Father     Obesity Father        Social History     Socioeconomic History    Marital status:    Tobacco Use    Smoking status: Former Smoker     Packs/day: 1.00     Years: 30.00     Pack years: 30.00     Types: Cigarettes     Quit date:      Years since quittin.3    Smokeless tobacco: Never Used    Tobacco comment: Patient now vapes, but is weaining off   Substance and Sexual Activity    Alcohol use: Yes     Alcohol/week: 7.0 standard drinks     Types: 7 Glasses of wine per week     Comment: glass of wine daily    Drug use: No    Sexual activity: Yes     Birth control/protection: See Surgical Hx       Chief Complaint:   Chief Complaint   Patient presents with    Right Ankle - Pain, Initial Visit, Initial Assessment, Injury     DOI: 2022 -- 8 days s/p oblique FX of Right lateral malleolus after taking a misstep and falling at home. Placed in a splint after an Urgent Care visit last week. PL 9/10 today. C/o night pain.           History of present illness:    This is a 61 y.o. year old female who complains of patient is being seen today with right ankle pain as an initial visit patient states that she sustained an injury on 2022 8 days ago injuring the lateral malleolus when she misstepped falling at home she went to Urgent Care was placed in a splint her pain level is 9/10    Review of Systems:    Constitution: Denies chills, fever, and sweats.  HENT: Denies headaches or blurry vision.  Cardiovascular: Denies chest pain or irregular heart beat.  Respiratory: Denies cough or shortness of breath.  Gastrointestinal: Denies abdominal pain, nausea, or vomiting.  Musculoskeletal:  Denies muscle cramps.  Neurological: Denies dizziness or focal weakness.  Psychiatric/Behavioral: Normal mental status.  Hematologic/Lymphatic: Denies bleeding problem or easy bruising/bleeding.  Skin: Denies rash or suspicious lesions.    Examination:    Vital Signs:   "  Vitals:    05/17/22 0850   Weight: 68 kg (150 lb)   Height: 5' 5" (1.651 m)   PainSc:   9   PainLoc: Ankle       Body mass index is 24.96 kg/m².    This a well-developed, well nourished patient in no acute distress.    Alert and oriented x 3 and cooperative to examination.       Physical Exam:  Right ankle-patient has swelling and tenderness over the lateral malleolus she has no tenderness over the medial malleolus she is neurovascularly intact    Imaging:  X-ray of the right ankle shows a minimally displaced oblique fracture of the lateral malleolus the mortise is intact       Assessment: Closed fracture of distal lateral malleolus of right fibula, initial encounter        Plan:  We will place the patient in a below-knee removable cast she can continue full weight-bearing as tolerated will see her back in 3 weeks for re-x-ray out of the cast boot of her ankle      DISCLAIMER: This note may have been dictated using voice recognition software and may contain grammatical errors.     NOTE: Consult report sent to referring provider via [employer] EMR.    "

## 2022-05-31 ENCOUNTER — PATIENT MESSAGE (OUTPATIENT)
Dept: ADMINISTRATIVE | Facility: HOSPITAL | Age: 62
End: 2022-05-31
Payer: COMMERCIAL

## 2022-06-06 DIAGNOSIS — S82.61XA CLOSED FRACTURE OF DISTAL LATERAL MALLEOLUS OF RIGHT FIBULA, INITIAL ENCOUNTER: Primary | ICD-10-CM

## 2022-06-07 ENCOUNTER — HOSPITAL ENCOUNTER (OUTPATIENT)
Dept: RADIOLOGY | Facility: HOSPITAL | Age: 62
Discharge: HOME | End: 2022-06-07
Attending: ORTHOPAEDIC SURGERY
Payer: COMMERCIAL

## 2022-06-07 ENCOUNTER — PATIENT MESSAGE (OUTPATIENT)
Dept: ORTHOPEDICS | Facility: CLINIC | Age: 62
End: 2022-06-07

## 2022-06-07 ENCOUNTER — OFFICE VISIT (OUTPATIENT)
Dept: ORTHOPEDICS | Facility: CLINIC | Age: 62
End: 2022-06-07
Payer: COMMERCIAL

## 2022-06-07 VITALS — WEIGHT: 149.94 LBS | BODY MASS INDEX: 24.98 KG/M2 | HEIGHT: 65 IN

## 2022-06-07 DIAGNOSIS — S82.61XA CLOSED FRACTURE OF DISTAL LATERAL MALLEOLUS OF RIGHT FIBULA, INITIAL ENCOUNTER: Primary | ICD-10-CM

## 2022-06-07 DIAGNOSIS — S82.61XA CLOSED FRACTURE OF DISTAL LATERAL MALLEOLUS OF RIGHT FIBULA, INITIAL ENCOUNTER: ICD-10-CM

## 2022-06-07 PROCEDURE — 99999 PR PBB SHADOW E&M-EST. PATIENT-LVL III: ICD-10-PCS | Mod: PBBFAC,,, | Performed by: ORTHOPAEDIC SURGERY

## 2022-06-07 PROCEDURE — 99999 PR PBB SHADOW E&M-EST. PATIENT-LVL III: CPT | Mod: PBBFAC,,, | Performed by: ORTHOPAEDIC SURGERY

## 2022-06-07 PROCEDURE — 99213 OFFICE O/P EST LOW 20 MIN: CPT | Mod: S$GLB,,, | Performed by: ORTHOPAEDIC SURGERY

## 2022-06-07 PROCEDURE — 73610 X-RAY EXAM OF ANKLE: CPT | Mod: TC,PN,RT

## 2022-06-07 PROCEDURE — 73610 XR ANKLE COMPLETE 3 VIEW RIGHT: ICD-10-PCS | Mod: 26,RT,, | Performed by: RADIOLOGY

## 2022-06-07 PROCEDURE — 73610 X-RAY EXAM OF ANKLE: CPT | Mod: 26,RT,, | Performed by: RADIOLOGY

## 2022-06-07 PROCEDURE — 1159F MED LIST DOCD IN RCRD: CPT | Mod: CPTII,S$GLB,, | Performed by: ORTHOPAEDIC SURGERY

## 2022-06-07 PROCEDURE — 3008F PR BODY MASS INDEX (BMI) DOCUMENTED: ICD-10-PCS | Mod: CPTII,S$GLB,, | Performed by: ORTHOPAEDIC SURGERY

## 2022-06-07 PROCEDURE — 99213 PR OFFICE/OUTPT VISIT, EST, LEVL III, 20-29 MIN: ICD-10-PCS | Mod: S$GLB,,, | Performed by: ORTHOPAEDIC SURGERY

## 2022-06-07 PROCEDURE — 3008F BODY MASS INDEX DOCD: CPT | Mod: CPTII,S$GLB,, | Performed by: ORTHOPAEDIC SURGERY

## 2022-06-07 PROCEDURE — 1159F PR MEDICATION LIST DOCUMENTED IN MEDICAL RECORD: ICD-10-PCS | Mod: CPTII,S$GLB,, | Performed by: ORTHOPAEDIC SURGERY

## 2022-06-07 NOTE — PROGRESS NOTES
6/7/2022    Past Medical History:   Diagnosis Date    Abscess of right lung with pneumonia     Cervical disc disorder at C4-C5 level with radiculopathy 05/2019    Degeneration of cervical disc without myelopathy 05/2019       Past Surgical History:   Procedure Laterality Date    ANTERIOR CERVICAL DISCECTOMY W/ FUSION N/A 5/23/2019    Procedure: DISCECTOMY, SPINE, CERVICAL, ANTERIOR APPROACH, WITH FUSION C4-C5 C5-C6 C6-C7;  Surgeon: Marco A Cooley MD;  Location: [facility] OR;  Service: Neurosurgery;  Laterality: N/A;    AUGMENTATION OF BREAST Bilateral     baker's cyst removal Right     BREAST SURGERY      EPIDURAL STEROID INJECTION INTO CERVICAL SPINE N/A 2/15/2019    Procedure: Injection-steroid-epidural-cervical;  Surgeon: Chet Bello MD;  Location: [facility] OR;  Service: Pain Management;  Laterality: N/A;  C7-T1    HYSTERECTOMY      KNEE ARTHROSCOPY Bilateral     SHOULDER ARTHROSCOPY Bilateral        Current Outpatient Medications   Medication Sig    ciprofloxacin HCl (CIPRO) 250 MG tablet Take 1 tablet (250 mg total) by mouth daily as needed.    fluticasone propionate (FLONASE) 50 mcg/actuation nasal spray 1 spray (50 mcg total) by Each Nostril route once daily.    gabapentin (NEURONTIN) 600 MG tablet Take 600 mg by mouth 3 (three) times daily. TAKE AM OF SURGERY WITH A SIP OF WATER    oxyCODONE-acetaminophen (PERCOCET) 5-325 mg per tablet Take 1 tablet by mouth every 6 (six) hours as needed for Pain.    traZODone (DESYREL) 100 MG tablet TAKE ONE TABLET BY MOUTH EVERY 24 HOURS    hydroCHLOROthiazide (HYDRODIURIL) 12.5 MG Tab Take 1 tablet (12.5 mg total) by mouth once daily.    losartan (COZAAR) 50 MG tablet Take 1 tablet (50 mg total) by mouth once daily.     No current facility-administered medications for this visit.       Review of patient's allergies indicates:  No Known Allergies    Family History   Problem Relation Age of Onset    Rheum arthritis Mother     Arthritis Mother     Obesity  "Mother     Heart disease Father     Parkinsonism Father     Obesity Father        Social History     Socioeconomic History    Marital status:    Tobacco Use    Smoking status: Former Smoker     Packs/day: 1.00     Years: 30.00     Pack years: 30.00     Types: Cigarettes     Quit date:      Years since quittin.4    Smokeless tobacco: Never Used    Tobacco comment: Patient now vapes, but is weaining off   Substance and Sexual Activity    Alcohol use: Yes     Alcohol/week: 7.0 standard drinks     Types: 7 Glasses of wine per week     Comment: glass of wine daily    Drug use: No    Sexual activity: Yes     Birth control/protection: See Surgical Hx       Chief Complaint:   Chief Complaint   Patient presents with    Right Ankle - Follow-up, Fracture     DOI: 2022 - 4 w 1 d S/P Right Ankle FX. She has increased pain while putting weight on her foot without the boot.          History of present illness:    This is a 61 y.o. year old female who complains of patient is now 4 weeks status post right ankle fracture of the lateral malleolus a nondisplaced fracture of the mortise was well centered she is putting full weight-bearing as tolerated on her leg with a walking boot    Review of Systems:    Constitution: Denies chills, fever, and sweats.  HENT: Denies headaches or blurry vision.  Cardiovascular: Denies chest pain or irregular heart beat.  Respiratory: Denies cough or shortness of breath.  Gastrointestinal: Denies abdominal pain, nausea, or vomiting.  Musculoskeletal:  Denies muscle cramps.  Neurological: Denies dizziness or focal weakness.  Psychiatric/Behavioral: Normal mental status.  Hematologic/Lymphatic: Denies bleeding problem or easy bruising/bleeding.  Skin: Denies rash or suspicious lesions.    Examination:    Vital Signs:    Vitals:    22 0901   Weight: 68 kg (149 lb 14.6 oz)   Height: 5' 5" (1.651 m)   PainSc:   5   PainLoc: Ankle       Body mass index is 24.95 " kg/m².    This a well-developed, well nourished patient in no acute distress.    Alert and oriented x 3 and cooperative to examination.       Physical Exam:  Right ankle-patient has some tenderness over the fracture site laterally she has no me ankle pain    Imaging:  X-rays shows a mildly displaced lateral malleolus fracture spiral fracture the mortise is well centered there is no medial soft tissue swelling       Assessment: Closed fracture of distal lateral malleolus of right fibula, initial encounter        Plan:  Patient is going to remain in walking boot for another 3 weeks will see her back in 3 weeks remove her from the boot and x-ray are hopefully we get her back full weight-bearing without the boot on return      DISCLAIMER: This note may have been dictated using voice recognition software and may contain grammatical errors.     NOTE: Consult report sent to referring provider via [employer] EMR.

## 2022-06-08 ENCOUNTER — PATIENT MESSAGE (OUTPATIENT)
Dept: ORTHOPEDICS | Facility: CLINIC | Age: 62
End: 2022-06-08
Payer: COMMERCIAL

## 2022-06-08 NOTE — TELEPHONE ENCOUNTER
Can you look at this patient's xrays and explain to her about the impressions. She is apparently reading her reports from the portal and has questions.

## 2022-06-29 DIAGNOSIS — S82.61XD CLOSED FRACTURE OF DISTAL LATERAL MALLEOLUS OF RIGHT FIBULA WITH ROUTINE HEALING, SUBSEQUENT ENCOUNTER: ICD-10-CM

## 2022-06-29 DIAGNOSIS — S82.61XA CLOSED FRACTURE OF DISTAL LATERAL MALLEOLUS OF RIGHT FIBULA, INITIAL ENCOUNTER: Primary | ICD-10-CM

## 2022-07-05 ENCOUNTER — HOSPITAL ENCOUNTER (OUTPATIENT)
Dept: RADIOLOGY | Facility: HOSPITAL | Age: 62
Discharge: HOME | End: 2022-07-05
Attending: ORTHOPAEDIC SURGERY
Payer: COMMERCIAL

## 2022-07-05 ENCOUNTER — OFFICE VISIT (OUTPATIENT)
Dept: ORTHOPEDICS | Facility: CLINIC | Age: 62
End: 2022-07-05
Payer: COMMERCIAL

## 2022-07-05 VITALS — HEIGHT: 65 IN | BODY MASS INDEX: 24.98 KG/M2 | WEIGHT: 149.94 LBS

## 2022-07-05 DIAGNOSIS — S82.61XA CLOSED FRACTURE OF DISTAL LATERAL MALLEOLUS OF RIGHT FIBULA, INITIAL ENCOUNTER: Primary | ICD-10-CM

## 2022-07-05 DIAGNOSIS — S82.61XD CLOSED FRACTURE OF DISTAL LATERAL MALLEOLUS OF RIGHT FIBULA WITH ROUTINE HEALING, SUBSEQUENT ENCOUNTER: ICD-10-CM

## 2022-07-05 PROCEDURE — 1159F PR MEDICATION LIST DOCUMENTED IN MEDICAL RECORD: ICD-10-PCS | Mod: CPTII,S$GLB,, | Performed by: ORTHOPAEDIC SURGERY

## 2022-07-05 PROCEDURE — 99999 PR PBB SHADOW E&M-EST. PATIENT-LVL III: CPT | Mod: PBBFAC,,, | Performed by: ORTHOPAEDIC SURGERY

## 2022-07-05 PROCEDURE — 3008F BODY MASS INDEX DOCD: CPT | Mod: CPTII,S$GLB,, | Performed by: ORTHOPAEDIC SURGERY

## 2022-07-05 PROCEDURE — 73610 X-RAY EXAM OF ANKLE: CPT | Mod: 26,RT,, | Performed by: RADIOLOGY

## 2022-07-05 PROCEDURE — 99213 PR OFFICE/OUTPT VISIT, EST, LEVL III, 20-29 MIN: ICD-10-PCS | Mod: S$GLB,,, | Performed by: ORTHOPAEDIC SURGERY

## 2022-07-05 PROCEDURE — 73610 X-RAY EXAM OF ANKLE: CPT | Mod: TC,PN,RT

## 2022-07-05 PROCEDURE — 99999 PR PBB SHADOW E&M-EST. PATIENT-LVL III: ICD-10-PCS | Mod: PBBFAC,,, | Performed by: ORTHOPAEDIC SURGERY

## 2022-07-05 PROCEDURE — 99213 OFFICE O/P EST LOW 20 MIN: CPT | Mod: S$GLB,,, | Performed by: ORTHOPAEDIC SURGERY

## 2022-07-05 PROCEDURE — 3008F PR BODY MASS INDEX (BMI) DOCUMENTED: ICD-10-PCS | Mod: CPTII,S$GLB,, | Performed by: ORTHOPAEDIC SURGERY

## 2022-07-05 PROCEDURE — 1159F MED LIST DOCD IN RCRD: CPT | Mod: CPTII,S$GLB,, | Performed by: ORTHOPAEDIC SURGERY

## 2022-07-05 PROCEDURE — 73610 XR ANKLE COMPLETE 3 VIEW RIGHT: ICD-10-PCS | Mod: 26,RT,, | Performed by: RADIOLOGY

## 2022-07-05 NOTE — PROGRESS NOTES
7/5/2022    Past Medical History:   Diagnosis Date    Abscess of right lung with pneumonia     Cervical disc disorder at C4-C5 level with radiculopathy 05/2019    Degeneration of cervical disc without myelopathy 05/2019       Past Surgical History:   Procedure Laterality Date    ANTERIOR CERVICAL DISCECTOMY W/ FUSION N/A 5/23/2019    Procedure: DISCECTOMY, SPINE, CERVICAL, ANTERIOR APPROACH, WITH FUSION C4-C5 C5-C6 C6-C7;  Surgeon: Marco A Cooley MD;  Location: [facility] OR;  Service: Neurosurgery;  Laterality: N/A;    AUGMENTATION OF BREAST Bilateral     baker's cyst removal Right     BREAST SURGERY      EPIDURAL STEROID INJECTION INTO CERVICAL SPINE N/A 2/15/2019    Procedure: Injection-steroid-epidural-cervical;  Surgeon: Chet Bello MD;  Location: [facility] OR;  Service: Pain Management;  Laterality: N/A;  C7-T1    HYSTERECTOMY      KNEE ARTHROSCOPY Bilateral     SHOULDER ARTHROSCOPY Bilateral        Current Outpatient Medications   Medication Sig    ciprofloxacin HCl (CIPRO) 250 MG tablet Take 1 tablet (250 mg total) by mouth daily as needed.    cyclobenzaprine (FLEXERIL) 10 MG tablet TAKE ONE TABLET (10 MG) BY MOUTH THREE TIMES DAILY AS NEEDED FOR MUSCLE SPASMS    fluticasone propionate (FLONASE) 50 mcg/actuation nasal spray 1 spray (50 mcg total) by Each Nostril route once daily.    gabapentin (NEURONTIN) 600 MG tablet Take 600 mg by mouth 3 (three) times daily. TAKE AM OF SURGERY WITH A SIP OF WATER    oxyCODONE-acetaminophen (PERCOCET) 5-325 mg per tablet Take 1 tablet by mouth every 6 (six) hours as needed for Pain.    traZODone (DESYREL) 100 MG tablet TAKE ONE TABLET BY MOUTH EVERY 24 HOURS    hydroCHLOROthiazide (HYDRODIURIL) 12.5 MG Tab Take 1 tablet (12.5 mg total) by mouth once daily.    losartan (COZAAR) 50 MG tablet Take 1 tablet (50 mg total) by mouth once daily.     No current facility-administered medications for this visit.       Review of patient's allergies indicates:  No Known  Allergies    Family History   Problem Relation Age of Onset    Rheum arthritis Mother     Arthritis Mother     Obesity Mother     Heart disease Father     Parkinsonism Father     Obesity Father        Social History     Socioeconomic History    Marital status:    Tobacco Use    Smoking status: Former Smoker     Packs/day: 1.00     Years: 30.00     Pack years: 30.00     Types: Cigarettes     Quit date:      Years since quittin.5    Smokeless tobacco: Never Used    Tobacco comment: Patient now vapes, but is weaining off   Substance and Sexual Activity    Alcohol use: Yes     Alcohol/week: 7.0 standard drinks     Types: 7 Glasses of wine per week     Comment: glass of wine daily    Drug use: No    Sexual activity: Yes     Birth control/protection: See Surgical Hx       Chief Complaint:   Chief Complaint   Patient presents with    Right Ankle - Pain, Injury     DOI: 2022 -- 8 wks s/p oblique FX of Right lateral malleolus. Doing well. Wearing walking boot and compression sleeve. She has no pain today.          History of present illness:    This is a 61 y.o. year old female who complains of patient is now 8 weeks status post spiral fracture of the lateral malleolus of the right ankle she says she is doing well she is wearing a walking boot a compression sleeve no pain today    Review of Systems:    Constitution: Denies chills, fever, and sweats.  HENT: Denies headaches or blurry vision.  Cardiovascular: Denies chest pain or irregular heart beat.  Respiratory: Denies cough or shortness of breath.  Gastrointestinal: Denies abdominal pain, nausea, or vomiting.  Musculoskeletal:  Denies muscle cramps.  Neurological: Denies dizziness or focal weakness.  Psychiatric/Behavioral: Normal mental status.  Hematologic/Lymphatic: Denies bleeding problem or easy bruising/bleeding.  Skin: Denies rash or suspicious lesions.    Examination:    Vital Signs:    Vitals:    22 0931   Weight: 68 kg  "(149 lb 14.6 oz)   Height: 5' 5" (1.651 m)   PainSc: 0-No pain   PainLoc: Ankle       Body mass index is 24.95 kg/m².    This a well-developed, well nourished patient in no acute distress.    Alert and oriented x 3 and cooperative to examination.       Physical Exam:  Right ankle-patient has no pain over the fracture site no excessive swelling moves the ankle well    Imaging:  X-rays show the mortise to be well centered there is a fracture line still present through the lateral malleolus it looks like it is healing the bone is somewhat osteoporotic       Assessment: There are no diagnoses linked to this encounter.    Plan:  Patient can come out of the walking boot continue walking full weight-bearing do some stretching exercises I will check her back in about 4 weeks      DISCLAIMER: This note may have been dictated using voice recognition software and may contain grammatical errors.     NOTE: Consult report sent to referring provider via [employer] EMR.    "

## 2022-07-26 ENCOUNTER — PATIENT MESSAGE (OUTPATIENT)
Dept: FAMILY MEDICINE | Facility: CLINIC | Age: 62
End: 2022-07-26
Payer: COMMERCIAL

## 2022-07-27 NOTE — TELEPHONE ENCOUNTER
Please call her on the telephone.  Unfortunately I will not be able to send in any medication without seeing her.  She should be able to schedule an appointment here in clinic with someone this week.  Please give her my condolences.

## 2022-07-27 NOTE — TELEPHONE ENCOUNTER
Spoke to pt states she can not come today as she is overwhelmed with  arrangements and has several appt today for  planning.  Pt agreed to come tomorrow am. Appt scheduled. Pt states she just has plenty down moments of crying and feeling of overwhelmed. Pt states otherwise she is doing ok just looking for something to help her get through the next few weeks.

## 2022-07-28 ENCOUNTER — OFFICE VISIT (OUTPATIENT)
Dept: FAMILY MEDICINE | Facility: CLINIC | Age: 62
End: 2022-07-28
Payer: COMMERCIAL

## 2022-07-28 VITALS
DIASTOLIC BLOOD PRESSURE: 84 MMHG | TEMPERATURE: 99 F | SYSTOLIC BLOOD PRESSURE: 142 MMHG | WEIGHT: 148.13 LBS | HEART RATE: 88 BPM | BODY MASS INDEX: 24.68 KG/M2 | OXYGEN SATURATION: 97 % | RESPIRATION RATE: 18 BRPM | HEIGHT: 65 IN

## 2022-07-28 DIAGNOSIS — M54.12 CERVICAL RADICULITIS: ICD-10-CM

## 2022-07-28 DIAGNOSIS — F43.21 GRIEF REACTION: Primary | ICD-10-CM

## 2022-07-28 PROCEDURE — 1159F MED LIST DOCD IN RCRD: CPT | Mod: CPTII,S$GLB,, | Performed by: PHYSICIAN ASSISTANT

## 2022-07-28 PROCEDURE — 99214 OFFICE O/P EST MOD 30 MIN: CPT | Mod: S$GLB,,, | Performed by: PHYSICIAN ASSISTANT

## 2022-07-28 PROCEDURE — 3077F SYST BP >= 140 MM HG: CPT | Mod: CPTII,S$GLB,, | Performed by: PHYSICIAN ASSISTANT

## 2022-07-28 PROCEDURE — 99999 PR PBB SHADOW E&M-EST. PATIENT-LVL IV: CPT | Mod: PBBFAC,,, | Performed by: PHYSICIAN ASSISTANT

## 2022-07-28 PROCEDURE — 3079F DIAST BP 80-89 MM HG: CPT | Mod: CPTII,S$GLB,, | Performed by: PHYSICIAN ASSISTANT

## 2022-07-28 PROCEDURE — 99214 PR OFFICE/OUTPT VISIT, EST, LEVL IV, 30-39 MIN: ICD-10-PCS | Mod: S$GLB,,, | Performed by: PHYSICIAN ASSISTANT

## 2022-07-28 PROCEDURE — 99999 PR PBB SHADOW E&M-EST. PATIENT-LVL IV: ICD-10-PCS | Mod: PBBFAC,,, | Performed by: PHYSICIAN ASSISTANT

## 2022-07-28 PROCEDURE — 3008F BODY MASS INDEX DOCD: CPT | Mod: CPTII,S$GLB,, | Performed by: PHYSICIAN ASSISTANT

## 2022-07-28 PROCEDURE — 3077F PR MOST RECENT SYSTOLIC BLOOD PRESSURE >= 140 MM HG: ICD-10-PCS | Mod: CPTII,S$GLB,, | Performed by: PHYSICIAN ASSISTANT

## 2022-07-28 PROCEDURE — 3079F PR MOST RECENT DIASTOLIC BLOOD PRESSURE 80-89 MM HG: ICD-10-PCS | Mod: CPTII,S$GLB,, | Performed by: PHYSICIAN ASSISTANT

## 2022-07-28 PROCEDURE — 1159F PR MEDICATION LIST DOCUMENTED IN MEDICAL RECORD: ICD-10-PCS | Mod: CPTII,S$GLB,, | Performed by: PHYSICIAN ASSISTANT

## 2022-07-28 PROCEDURE — 3008F PR BODY MASS INDEX (BMI) DOCUMENTED: ICD-10-PCS | Mod: CPTII,S$GLB,, | Performed by: PHYSICIAN ASSISTANT

## 2022-07-28 RX ORDER — LORAZEPAM 0.5 MG/1
0.5 TABLET ORAL EVERY 12 HOURS PRN
Qty: 5 TABLET | Refills: 0 | Status: SHIPPED | OUTPATIENT
Start: 2022-07-28 | End: 2022-10-28 | Stop reason: ALTCHOICE

## 2022-07-28 RX ORDER — GABAPENTIN 600 MG/1
600 TABLET ORAL 3 TIMES DAILY
Qty: 30 TABLET | Refills: 2 | Status: SHIPPED | OUTPATIENT
Start: 2022-07-28 | End: 2022-10-28 | Stop reason: SDUPTHER

## 2022-07-28 RX ORDER — TOBRAMYCIN AND DEXAMETHASONE 3; 1 MG/ML; MG/ML
SUSPENSION/ DROPS OPHTHALMIC
COMMUNITY
Start: 2022-07-26

## 2022-07-28 NOTE — PROGRESS NOTES
Subjective:       Patient ID: Jesi Mcginnis is a 62 y.o. female.    Chief Complaint: Depression and Anxiety    Patient with history of HTN (currently not on medications and self monitors at home), cervical radiculitis on gabapentin presents for evaluation and help with anxiety and grief reaction.  Her daughter was recently killed in a hit and run accident.  The  of the vehicle has not been found.  She is having a hard time dealing with her daughters death and the lack of closure.  She is caring for her 3 year old grandson.  Her daughters services are this weekend.  She is having uncontrolled shacking and crying spells.  She has good family support and son is helping with arrangements.   Patients patient medical/surgical, social and family histories have been reviewed         Review of Systems   Psychiatric/Behavioral: Positive for dysphoric mood. The patient is nervous/anxious.        Objective:      Physical Exam  Constitutional:       General: She is not in acute distress.     Appearance: Normal appearance. She is well-developed. She is not ill-appearing.   HENT:      Head: Normocephalic and atraumatic.   Eyes:      Conjunctiva/sclera: Conjunctivae normal.   Cardiovascular:      Rate and Rhythm: Normal rate and regular rhythm.      Heart sounds: Normal heart sounds.   Pulmonary:      Effort: Pulmonary effort is normal.      Breath sounds: Normal breath sounds.   Musculoskeletal:      Right lower leg: No edema.      Left lower leg: No edema.   Skin:     General: Skin is warm and dry.   Neurological:      Mental Status: She is alert.   Psychiatric:         Attention and Perception: Attention normal.         Mood and Affect: Mood normal. Affect is tearful.         Speech: Speech normal.         Behavior: Behavior normal. Behavior is cooperative.         Thought Content: Thought content normal.         Cognition and Memory: Cognition and memory normal.         Judgment: Judgment normal.         Assessment:  "      1. Grief reaction    2. Cervical radiculitis        Plan:       Jesi was seen today for depression and anxiety.    Diagnoses and all orders for this visit:    Grief reaction  -     LORazepam (ATIVAN) 0.5 MG tablet; Take 1 tablet (0.5 mg total) by mouth every 12 (twelve) hours as needed for Anxiety.    Cervical radiculitis  -     gabapentin (NEURONTIN) 600 MG tablet; Take 1 tablet (600 mg total) by mouth 3 (three) times daily. TAKE AM OF SURGERY WITH A SIP OF WATER           Follow up 3 mo reason:BP, annual labs needed           Documentation entered by me for this encounter may have been done in part using speech-recognition technology. Although I have made an effort to ensure accuracy, "sound like" errors may exist and should be interpreted in context.   I spent a total of 35 minutes on the day of the visit.This includes face to face time and non-face to face time preparing to see the patient (eg, review of tests), obtaining and/or reviewing separately obtained history, documenting clinical information in the electronic or other health record, independently interpreting results and communicating results to the patient/family/caregiver, or care coordinator.    "

## 2022-08-01 DIAGNOSIS — S82.61XA CLOSED FRACTURE OF DISTAL LATERAL MALLEOLUS OF RIGHT FIBULA, INITIAL ENCOUNTER: Primary | ICD-10-CM

## 2022-08-17 ENCOUNTER — OFFICE VISIT (OUTPATIENT)
Dept: ORTHOPEDICS | Facility: CLINIC | Age: 62
End: 2022-08-17
Payer: COMMERCIAL

## 2022-08-17 ENCOUNTER — HOSPITAL ENCOUNTER (OUTPATIENT)
Dept: RADIOLOGY | Facility: HOSPITAL | Age: 62
Discharge: HOME | End: 2022-08-17
Attending: ORTHOPAEDIC SURGERY
Payer: COMMERCIAL

## 2022-08-17 VITALS — BODY MASS INDEX: 24.68 KG/M2 | HEIGHT: 65 IN | WEIGHT: 148.13 LBS

## 2022-08-17 DIAGNOSIS — S82.61XA CLOSED FRACTURE OF DISTAL LATERAL MALLEOLUS OF RIGHT FIBULA, INITIAL ENCOUNTER: ICD-10-CM

## 2022-08-17 DIAGNOSIS — S82.61XA CLOSED FRACTURE OF DISTAL LATERAL MALLEOLUS OF RIGHT FIBULA, INITIAL ENCOUNTER: Primary | ICD-10-CM

## 2022-08-17 PROCEDURE — 73610 X-RAY EXAM OF ANKLE: CPT | Mod: 26,RT,, | Performed by: RADIOLOGY

## 2022-08-17 PROCEDURE — 99213 PR OFFICE/OUTPT VISIT, EST, LEVL III, 20-29 MIN: ICD-10-PCS | Mod: S$GLB,,, | Performed by: ORTHOPAEDIC SURGERY

## 2022-08-17 PROCEDURE — 1159F PR MEDICATION LIST DOCUMENTED IN MEDICAL RECORD: ICD-10-PCS | Mod: CPTII,S$GLB,, | Performed by: ORTHOPAEDIC SURGERY

## 2022-08-17 PROCEDURE — 1159F MED LIST DOCD IN RCRD: CPT | Mod: CPTII,S$GLB,, | Performed by: ORTHOPAEDIC SURGERY

## 2022-08-17 PROCEDURE — 99999 PR PBB SHADOW E&M-EST. PATIENT-LVL III: CPT | Mod: PBBFAC,,, | Performed by: ORTHOPAEDIC SURGERY

## 2022-08-17 PROCEDURE — 3008F BODY MASS INDEX DOCD: CPT | Mod: CPTII,S$GLB,, | Performed by: ORTHOPAEDIC SURGERY

## 2022-08-17 PROCEDURE — 73610 XR ANKLE COMPLETE 3 VIEW RIGHT: ICD-10-PCS | Mod: 26,RT,, | Performed by: RADIOLOGY

## 2022-08-17 PROCEDURE — 99213 OFFICE O/P EST LOW 20 MIN: CPT | Mod: S$GLB,,, | Performed by: ORTHOPAEDIC SURGERY

## 2022-08-17 PROCEDURE — 73610 X-RAY EXAM OF ANKLE: CPT | Mod: TC,PN,RT

## 2022-08-17 PROCEDURE — 3008F PR BODY MASS INDEX (BMI) DOCUMENTED: ICD-10-PCS | Mod: CPTII,S$GLB,, | Performed by: ORTHOPAEDIC SURGERY

## 2022-08-17 PROCEDURE — 99999 PR PBB SHADOW E&M-EST. PATIENT-LVL III: ICD-10-PCS | Mod: PBBFAC,,, | Performed by: ORTHOPAEDIC SURGERY

## 2022-08-17 RX ORDER — CYCLOBENZAPRINE HCL 10 MG
10 TABLET ORAL 3 TIMES DAILY PRN
Qty: 30 TABLET | Refills: 1 | Status: SHIPPED | OUTPATIENT
Start: 2022-08-17 | End: 2022-08-27

## 2022-08-17 NOTE — PROGRESS NOTES
8/17/2022    Past Medical History:   Diagnosis Date    Abscess of right lung with pneumonia     Cervical disc disorder at C4-C5 level with radiculopathy 05/2019    Degeneration of cervical disc without myelopathy 05/2019       Past Surgical History:   Procedure Laterality Date    ANTERIOR CERVICAL DISCECTOMY W/ FUSION N/A 5/23/2019    Procedure: DISCECTOMY, SPINE, CERVICAL, ANTERIOR APPROACH, WITH FUSION C4-C5 C5-C6 C6-C7;  Surgeon: Marco A Cooley MD;  Location: [facility] OR;  Service: Neurosurgery;  Laterality: N/A;    AUGMENTATION OF BREAST Bilateral     baker's cyst removal Right     BREAST SURGERY      EPIDURAL STEROID INJECTION INTO CERVICAL SPINE N/A 2/15/2019    Procedure: Injection-steroid-epidural-cervical;  Surgeon: Chet Bello MD;  Location: [facility] OR;  Service: Pain Management;  Laterality: N/A;  C7-T1    HYSTERECTOMY      KNEE ARTHROSCOPY Bilateral     SHOULDER ARTHROSCOPY Bilateral        Current Outpatient Medications   Medication Sig    cyclobenzaprine (FLEXERIL) 10 MG tablet TAKE ONE TABLET (10 MG) BY MOUTH THREE TIMES DAILY AS NEEDED FOR MUSCLE SPASMS    fluticasone propionate (FLONASE) 50 mcg/actuation nasal spray 1 spray (50 mcg total) by Each Nostril route once daily.    gabapentin (NEURONTIN) 600 MG tablet Take 1 tablet (600 mg total) by mouth 3 (three) times daily. TAKE AM OF SURGERY WITH A SIP OF WATER    LORazepam (ATIVAN) 0.5 MG tablet Take 1 tablet (0.5 mg total) by mouth every 12 (twelve) hours as needed for Anxiety.    tobramycin-dexamethasone 0.3-0.1% (TOBRADEX) 0.3-0.1 % DrpS Place into the right eye.    traZODone (DESYREL) 100 MG tablet TAKE ONE TABLET BY MOUTH EVERY 24 HOURS     No current facility-administered medications for this visit.       Review of patient's allergies indicates:  No Known Allergies    Family History   Problem Relation Age of Onset    Rheum arthritis Mother     Arthritis Mother     Obesity Mother     Heart disease Father     Parkinsonism  Father     Obesity Father        Social History     Socioeconomic History    Marital status:    Tobacco Use    Smoking status: Former Smoker     Packs/day: 1.00     Years: 30.00     Pack years: 30.00     Types: Cigarettes     Quit date:      Years since quittin.6    Smokeless tobacco: Never Used    Tobacco comment: Patient now vapes, but is weaining off   Substance and Sexual Activity    Alcohol use: Yes     Alcohol/week: 7.0 standard drinks     Types: 7 Glasses of wine per week     Comment: glass of wine daily    Drug use: No    Sexual activity: Yes     Birth control/protection: See Surgical Hx     Social Determinants of Health     Financial Resource Strain: Low Risk     Difficulty of Paying Living Expenses: Not very hard   Food Insecurity: No Food Insecurity    Worried About Running Out of Food in the Last Year: Never true    Ran Out of Food in the Last Year: Never true   Transportation Needs: No Transportation Needs    Lack of Transportation (Medical): No    Lack of Transportation (Non-Medical): No   Physical Activity: Insufficiently Active    Days of Exercise per Week: 6 days    Minutes of Exercise per Session: 20 min   Stress: Stress Concern Present    Feeling of Stress : To some extent   Social Connections: Unknown    Frequency of Communication with Friends and Family: More than three times a week    Frequency of Social Gatherings with Friends and Family: Once a week    Active Member of Clubs or Organizations: Yes    Attends Club or Organization Meetings: Never    Marital Status:    Housing Stability: Low Risk     Unable to Pay for Housing in the Last Year: No    Number of Places Lived in the Last Year: 1    Unstable Housing in the Last Year: No       Chief Complaint:   Chief Complaint   Patient presents with    Right Ankle - Pain, Injury     DOI: 2022 -- s/p oblique FX of Right lateral malleolus. LV 2022 -- come out of the walking boot continue  "walking full weight-bearing do some stretching exercises. She still has occasional soreness         History of present illness:    This is a 62 y.o. year old female who complains of patient is now slightly over 3 months status post oblique fracture of the lateral malleolus her fracture appear to be healing well her mortise is well centered    Review of Systems:    Constitution: Denies chills, fever, and sweats.  HENT: Denies headaches or blurry vision.  Cardiovascular: Denies chest pain or irregular heart beat.  Respiratory: Denies cough or shortness of breath.  Gastrointestinal: Denies abdominal pain, nausea, or vomiting.  Musculoskeletal:  Denies muscle cramps.  Neurological: Denies dizziness or focal weakness.  Psychiatric/Behavioral: Normal mental status.  Hematologic/Lymphatic: Denies bleeding problem or easy bruising/bleeding.  Skin: Denies rash or suspicious lesions.    Examination:    Vital Signs:    Vitals:    08/17/22 1024   Weight: 67.2 kg (148 lb 2.4 oz)   Height: 5' 5" (1.651 m)   PainSc:   1   PainLoc: Ankle       Body mass index is 24.65 kg/m².    This a well-developed, well nourished patient in no acute distress.    Alert and oriented x 3 and cooperative to examination.       Physical Exam:  Right ankle-patient has some slight tenderness over the fracture site area she moves her ankle well there is no effusion present    Imaging:  The x-ray of the right ankle shows a lateral malleolus fracture to be healing in good position there is no widening of the mortise       Assessment: Closed fracture of distal lateral malleolus of right fibula, initial encounter        Plan:  Clinically I think her fracture is healing well she has some slight tenderness she measures her pain level at 1/10 at times patient can remove herself from the walking boot she is ambulating with a shoe with minimal discomfort we will see her back in 2 months .      DISCLAIMER: This note may have been dictated using voice recognition " software and may contain grammatical errors.     NOTE: Consult report sent to referring provider via [employer] EMR.

## 2022-09-14 DIAGNOSIS — Z12.31 OTHER SCREENING MAMMOGRAM: ICD-10-CM

## 2022-09-14 DIAGNOSIS — I10 HTN (HYPERTENSION): ICD-10-CM

## 2022-09-19 ENCOUNTER — PATIENT MESSAGE (OUTPATIENT)
Dept: ADMINISTRATIVE | Facility: HOSPITAL | Age: 62
End: 2022-09-19
Payer: COMMERCIAL

## 2022-10-28 ENCOUNTER — LAB VISIT (OUTPATIENT)
Dept: LAB | Facility: HOSPITAL | Age: 62
End: 2022-10-28
Attending: PHYSICIAN ASSISTANT
Payer: COMMERCIAL

## 2022-10-28 ENCOUNTER — OFFICE VISIT (OUTPATIENT)
Dept: FAMILY MEDICINE | Facility: CLINIC | Age: 62
End: 2022-10-28
Payer: COMMERCIAL

## 2022-10-28 VITALS
BODY MASS INDEX: 24.94 KG/M2 | HEART RATE: 77 BPM | TEMPERATURE: 98 F | OXYGEN SATURATION: 99 % | HEIGHT: 65 IN | WEIGHT: 149.69 LBS | DIASTOLIC BLOOD PRESSURE: 78 MMHG | SYSTOLIC BLOOD PRESSURE: 128 MMHG

## 2022-10-28 DIAGNOSIS — I10 PRIMARY HYPERTENSION: ICD-10-CM

## 2022-10-28 DIAGNOSIS — I10 PRIMARY HYPERTENSION: Primary | ICD-10-CM

## 2022-10-28 DIAGNOSIS — Z12.11 SCREEN FOR COLON CANCER: ICD-10-CM

## 2022-10-28 DIAGNOSIS — F43.21 GRIEF REACTION: ICD-10-CM

## 2022-10-28 DIAGNOSIS — Z12.2 SCREENING FOR LUNG CANCER: ICD-10-CM

## 2022-10-28 DIAGNOSIS — M54.12 CERVICAL RADICULITIS: ICD-10-CM

## 2022-10-28 LAB
ALBUMIN SERPL BCP-MCNC: 4.2 G/DL (ref 3.5–5.2)
ALP SERPL-CCNC: 68 U/L (ref 55–135)
ALT SERPL W/O P-5'-P-CCNC: 79 U/L (ref 10–44)
ANION GAP SERPL CALC-SCNC: 15 MMOL/L (ref 8–16)
AST SERPL-CCNC: 136 U/L (ref 10–40)
BASOPHILS # BLD AUTO: 0.06 K/UL (ref 0–0.2)
BASOPHILS NFR BLD: 1.6 % (ref 0–1.9)
BILIRUB SERPL-MCNC: 0.6 MG/DL (ref 0.1–1)
BUN SERPL-MCNC: 8 MG/DL (ref 6–20)
CALCIUM SERPL-MCNC: 9.2 MG/DL (ref 8.7–10.5)
CHLORIDE SERPL-SCNC: 99 MMOL/L (ref 95–110)
CHOLEST SERPL-MCNC: 245 MG/DL (ref 120–199)
CHOLEST/HDLC SERPL: 2.1 {RATIO} (ref 2–5)
CO2 SERPL-SCNC: 25 MMOL/L (ref 23–29)
CREAT SERPL-MCNC: 0.8 MG/DL (ref 0.5–1.4)
DIFFERENTIAL METHOD: ABNORMAL
EOSINOPHIL # BLD AUTO: 0.2 K/UL (ref 0–0.5)
EOSINOPHIL NFR BLD: 4.6 % (ref 0–8)
ERYTHROCYTE [DISTWIDTH] IN BLOOD BY AUTOMATED COUNT: 13.5 % (ref 11.5–14.5)
EST. GFR  (NO RACE VARIABLE): >60 ML/MIN/1.73 M^2
GLUCOSE SERPL-MCNC: 86 MG/DL (ref 70–110)
HCT VFR BLD AUTO: 41.2 % (ref 37–48.5)
HDLC SERPL-MCNC: 115 MG/DL (ref 40–75)
HDLC SERPL: 46.9 % (ref 20–50)
HGB BLD-MCNC: 13.8 G/DL (ref 12–16)
IMM GRANULOCYTES # BLD AUTO: 0.01 K/UL (ref 0–0.04)
IMM GRANULOCYTES NFR BLD AUTO: 0.3 % (ref 0–0.5)
LDLC SERPL CALC-MCNC: 112.4 MG/DL (ref 63–159)
LYMPHOCYTES # BLD AUTO: 1.2 K/UL (ref 1–4.8)
LYMPHOCYTES NFR BLD: 32.7 % (ref 18–48)
MCH RBC QN AUTO: 36.4 PG (ref 27–31)
MCHC RBC AUTO-ENTMCNC: 33.5 G/DL (ref 32–36)
MCV RBC AUTO: 109 FL (ref 82–98)
MONOCYTES # BLD AUTO: 0.4 K/UL (ref 0.3–1)
MONOCYTES NFR BLD: 10.1 % (ref 4–15)
NEUTROPHILS # BLD AUTO: 1.9 K/UL (ref 1.8–7.7)
NEUTROPHILS NFR BLD: 50.7 % (ref 38–73)
NONHDLC SERPL-MCNC: 130 MG/DL
NRBC BLD-RTO: 0 /100 WBC
PLATELET # BLD AUTO: 207 K/UL (ref 150–450)
PMV BLD AUTO: 11.4 FL (ref 9.2–12.9)
POTASSIUM SERPL-SCNC: 3.9 MMOL/L (ref 3.5–5.1)
PROT SERPL-MCNC: 7.4 G/DL (ref 6–8.4)
RBC # BLD AUTO: 3.79 M/UL (ref 4–5.4)
SODIUM SERPL-SCNC: 139 MMOL/L (ref 136–145)
TRIGL SERPL-MCNC: 88 MG/DL (ref 30–150)
WBC # BLD AUTO: 3.67 K/UL (ref 3.9–12.7)

## 2022-10-28 PROCEDURE — 1159F PR MEDICATION LIST DOCUMENTED IN MEDICAL RECORD: ICD-10-PCS | Mod: CPTII,S$GLB,, | Performed by: PHYSICIAN ASSISTANT

## 2022-10-28 PROCEDURE — 99999 PR PBB SHADOW E&M-EST. PATIENT-LVL IV: ICD-10-PCS | Mod: PBBFAC,,, | Performed by: PHYSICIAN ASSISTANT

## 2022-10-28 PROCEDURE — 3078F DIAST BP <80 MM HG: CPT | Mod: CPTII,S$GLB,, | Performed by: PHYSICIAN ASSISTANT

## 2022-10-28 PROCEDURE — 80053 COMPREHEN METABOLIC PANEL: CPT | Performed by: PHYSICIAN ASSISTANT

## 2022-10-28 PROCEDURE — 3078F PR MOST RECENT DIASTOLIC BLOOD PRESSURE < 80 MM HG: ICD-10-PCS | Mod: CPTII,S$GLB,, | Performed by: PHYSICIAN ASSISTANT

## 2022-10-28 PROCEDURE — 99214 PR OFFICE/OUTPT VISIT, EST, LEVL IV, 30-39 MIN: ICD-10-PCS | Mod: S$GLB,,, | Performed by: PHYSICIAN ASSISTANT

## 2022-10-28 PROCEDURE — 99214 OFFICE O/P EST MOD 30 MIN: CPT | Mod: S$GLB,,, | Performed by: PHYSICIAN ASSISTANT

## 2022-10-28 PROCEDURE — 85025 COMPLETE CBC W/AUTO DIFF WBC: CPT | Performed by: PHYSICIAN ASSISTANT

## 2022-10-28 PROCEDURE — 80061 LIPID PANEL: CPT | Performed by: PHYSICIAN ASSISTANT

## 2022-10-28 PROCEDURE — 1159F MED LIST DOCD IN RCRD: CPT | Mod: CPTII,S$GLB,, | Performed by: PHYSICIAN ASSISTANT

## 2022-10-28 PROCEDURE — 36415 COLL VENOUS BLD VENIPUNCTURE: CPT | Mod: PO | Performed by: PHYSICIAN ASSISTANT

## 2022-10-28 PROCEDURE — 3074F SYST BP LT 130 MM HG: CPT | Mod: CPTII,S$GLB,, | Performed by: PHYSICIAN ASSISTANT

## 2022-10-28 PROCEDURE — 3074F PR MOST RECENT SYSTOLIC BLOOD PRESSURE < 130 MM HG: ICD-10-PCS | Mod: CPTII,S$GLB,, | Performed by: PHYSICIAN ASSISTANT

## 2022-10-28 PROCEDURE — 99999 PR PBB SHADOW E&M-EST. PATIENT-LVL IV: CPT | Mod: PBBFAC,,, | Performed by: PHYSICIAN ASSISTANT

## 2022-10-28 RX ORDER — CYCLOBENZAPRINE HCL 10 MG
10 TABLET ORAL NIGHTLY
Qty: 30 TABLET | Refills: 2 | Status: SHIPPED | OUTPATIENT
Start: 2022-10-28

## 2022-10-28 RX ORDER — GABAPENTIN 600 MG/1
600 TABLET ORAL 3 TIMES DAILY
Qty: 90 TABLET | Refills: 2 | Status: SHIPPED | OUTPATIENT
Start: 2022-10-28

## 2022-10-28 NOTE — PROGRESS NOTES
Subjective:       Patient ID: Jesi Mcginnis is a 62 y.o. female.    Chief Complaint: Follow-up    Patient with history of HTN previous on medication but normal BP without med for 2 years, cervical radiculitis pain controlled with gabapentin and flexeril, and grief reaction Her daughter was killed this year in a hit and run accident.  The  of the vehicle has not been found.  She is having a hard time dealing with her daughters death and the lack of closure.  She is caring for her 4 year old grandson.  She is having a difficult time navigating her grief especially with having to raise her young grandson.  Patients patient medical/surgical, social and family histories have been reviewed       Review of Systems   Musculoskeletal:  Positive for neck pain.   Psychiatric/Behavioral:  Positive for dysphoric mood and sleep disturbance. Negative for self-injury and suicidal ideas. The patient is nervous/anxious.      Objective:      Physical Exam  Constitutional:       General: She is not in acute distress.     Appearance: She is well-developed.   HENT:      Head: Normocephalic and atraumatic.   Cardiovascular:      Rate and Rhythm: Normal rate and regular rhythm.      Heart sounds: Normal heart sounds.   Pulmonary:      Effort: Pulmonary effort is normal.      Breath sounds: Normal breath sounds.   Musculoskeletal:      Right lower leg: No edema.      Left lower leg: No edema.   Skin:     General: Skin is warm and dry.   Neurological:      Mental Status: She is alert.   Psychiatric:         Attention and Perception: Attention normal.         Mood and Affect: Affect is tearful.         Speech: Speech normal.         Behavior: Behavior normal. Behavior is cooperative.         Thought Content: Thought content normal.         Cognition and Memory: Cognition and memory normal.         Judgment: Judgment normal.       Assessment:       1. Primary hypertension    2. Grief reaction    3. Cervical radiculitis    4. Screen  "for colon cancer    5. Screening for lung cancer          Plan:       Jesi was seen today for follow-up.    Diagnoses and all orders for this visit:    Primary hypertension  Continue to monitor at home,   -     CBC Auto Differential; Future  -     Comprehensive Metabolic Panel; Future  -     Lipid Panel; Future  -     Urinalysis; Future    Grief reaction  -     Ambulatory referral/consult to Social Work; Future    Cervical radiculitis  -     cyclobenzaprine (FLEXERIL) 10 MG tablet; Take 1 tablet (10 mg total) by mouth every evening.  -     gabapentin (NEURONTIN) 600 MG tablet; Take 1 tablet (600 mg total) by mouth 3 (three) times daily.    Screen for colon cancer  -     Fecal Immunochemical Test (iFOBT); Future    Screening for lung cancer  -     CT Chest Lung Screening Low Dose; Future         Follow up for fasting lab today , number for social,  low dose CT, mammo, pcp in 3 mo has not seen in over a year .           Documentation entered by me for this encounter may have been done in part using speech-recognition technology. Although I have made an effort to ensure accuracy, "sound like" errors may exist and should be interpreted in context.   I spent a total of 31 minutes on the day of the visit.This includes face to face time and non-face to face time preparing to see the patient (eg, review of tests), obtaining and/or reviewing separately obtained history, documenting clinical information in the electronic or other health record, independently interpreting results and communicating results to the patient/family/caregiver, or care coordinator.      "

## 2022-10-29 ENCOUNTER — HOSPITAL ENCOUNTER (OUTPATIENT)
Dept: RADIOLOGY | Facility: HOSPITAL | Age: 62
Discharge: HOME | End: 2022-10-29
Attending: PHYSICIAN ASSISTANT
Payer: COMMERCIAL

## 2022-10-29 DIAGNOSIS — Z12.2 SCREENING FOR LUNG CANCER: ICD-10-CM

## 2022-10-29 PROCEDURE — 71271 CT CHEST LUNG SCREENING LOW DOSE: ICD-10-PCS | Mod: 26,,, | Performed by: RADIOLOGY

## 2022-10-29 PROCEDURE — 71271 CT THORAX LUNG CANCER SCR C-: CPT | Mod: TC

## 2022-10-29 PROCEDURE — 71271 CT THORAX LUNG CANCER SCR C-: CPT | Mod: 26,,, | Performed by: RADIOLOGY

## 2022-10-31 DIAGNOSIS — R74.01 TRANSAMINITIS: Primary | ICD-10-CM

## 2022-10-31 DIAGNOSIS — D75.89 MACROCYTOSIS WITHOUT ANEMIA: ICD-10-CM

## 2022-11-02 ENCOUNTER — LAB VISIT (OUTPATIENT)
Dept: LAB | Facility: HOSPITAL | Age: 62
End: 2022-11-02
Attending: FAMILY MEDICINE
Payer: COMMERCIAL

## 2022-11-02 DIAGNOSIS — Z12.11 SCREENING FOR MALIGNANT NEOPLASM OF COLON: ICD-10-CM

## 2022-11-02 PROCEDURE — 82274 ASSAY TEST FOR BLOOD FECAL: CPT | Performed by: FAMILY MEDICINE

## 2022-11-10 LAB — HEMOCCULT STL QL IA: NEGATIVE

## 2022-11-18 ENCOUNTER — HOSPITAL ENCOUNTER (OUTPATIENT)
Dept: RADIOLOGY | Facility: HOSPITAL | Age: 62
Discharge: HOME | End: 2022-11-18
Attending: PHYSICIAN ASSISTANT
Payer: COMMERCIAL

## 2022-11-18 DIAGNOSIS — R74.01 TRANSAMINITIS: ICD-10-CM

## 2022-11-18 PROCEDURE — 76705 ECHO EXAM OF ABDOMEN: CPT | Mod: TC

## 2022-11-18 PROCEDURE — 76705 US ABDOMEN LIMITED_LIVER: ICD-10-PCS | Mod: 26,,, | Performed by: RADIOLOGY

## 2022-11-18 PROCEDURE — 76705 ECHO EXAM OF ABDOMEN: CPT | Mod: 26,,, | Performed by: RADIOLOGY

## 2022-11-21 ENCOUNTER — PATIENT MESSAGE (OUTPATIENT)
Dept: ADMINISTRATIVE | Facility: HOSPITAL | Age: 62
End: 2022-11-21
Payer: COMMERCIAL

## 2022-11-21 DIAGNOSIS — R74.01 TRANSAMINITIS: Primary | ICD-10-CM

## 2022-11-22 DIAGNOSIS — R30.0 DYSURIA: Primary | ICD-10-CM

## 2023-05-16 ENCOUNTER — PATIENT MESSAGE (OUTPATIENT)
Dept: ADMINISTRATIVE | Facility: HOSPITAL | Age: 63
End: 2023-05-16
Payer: COMMERCIAL

## 2023-06-21 ENCOUNTER — PATIENT MESSAGE (OUTPATIENT)
Dept: ADMINISTRATIVE | Facility: HOSPITAL | Age: 63
End: 2023-06-21
Payer: COMMERCIAL

## 2023-08-31 ENCOUNTER — PATIENT MESSAGE (OUTPATIENT)
Dept: ADMINISTRATIVE | Facility: HOSPITAL | Age: 63
End: 2023-08-31
Payer: COMMERCIAL

## 2023-09-14 ENCOUNTER — PATIENT MESSAGE (OUTPATIENT)
Dept: ADMINISTRATIVE | Facility: HOSPITAL | Age: 63
End: 2023-09-14
Payer: COMMERCIAL

## 2023-09-26 ENCOUNTER — CLINICAL SUPPORT (OUTPATIENT)
Dept: OTHER | Facility: CLINIC | Age: 63
End: 2023-09-26

## 2023-09-26 DIAGNOSIS — Z00.8 ENCOUNTER FOR OTHER GENERAL EXAMINATION: ICD-10-CM

## 2023-09-27 VITALS
SYSTOLIC BLOOD PRESSURE: 157 MMHG | BODY MASS INDEX: 24.99 KG/M2 | DIASTOLIC BLOOD PRESSURE: 98 MMHG | HEIGHT: 65 IN | WEIGHT: 150 LBS

## 2023-09-27 LAB
GLUCOSE SERPL-MCNC: 93 MG/DL (ref 60–140)
HDLC SERPL-MCNC: 84 MG/DL
POC CHOLESTEROL, LDL (DOCK): 125 MG/DL
POC CHOLESTEROL, TOTAL: 227 MG/DL
TRIGL SERPL-MCNC: 103 MG/DL

## 2024-10-23 ENCOUNTER — CLINICAL SUPPORT (OUTPATIENT)
Dept: OTHER | Facility: CLINIC | Age: 64
End: 2024-10-23

## 2024-10-23 DIAGNOSIS — Z00.8 ENCOUNTER FOR OTHER GENERAL EXAMINATION: ICD-10-CM

## 2024-10-25 VITALS
HEIGHT: 65 IN | SYSTOLIC BLOOD PRESSURE: 138 MMHG | BODY MASS INDEX: 23.66 KG/M2 | DIASTOLIC BLOOD PRESSURE: 82 MMHG | WEIGHT: 142 LBS

## 2024-10-25 LAB
HDLC SERPL-MCNC: 101 MG/DL
POC CHOLESTEROL, TOTAL: 270 MG/DL
POC GLUCOSE, FASTING: 84 MG/DL (ref 60–110)
TRIGL SERPL-MCNC: 108 MG/DL

## (undated) DEVICE — SYR DISP LL 5CC

## (undated) DEVICE — SYS LABEL CORRECT MED

## (undated) DEVICE — GLOVE PROTEXIS PI CLASSIC 6.5

## (undated) DEVICE — SYR GLASS 5CC LUER LOK

## (undated) DEVICE — TUBING MINIBORE EXTENSION

## (undated) DEVICE — NDL HYPODERMIC BLUNT 18G 1.5IN

## (undated) DEVICE — GLOVE PROTEXIS PI CLASSIC 7.5

## (undated) DEVICE — NDL TUOHY EPIDURAL 20G X 3.5

## (undated) DEVICE — APPLICATOR CHLORAPREP CLR 10.5

## (undated) DEVICE — NDL SAFETY 25G X 1.5 ECLIPSE